# Patient Record
Sex: MALE | Race: WHITE | NOT HISPANIC OR LATINO | Employment: OTHER | URBAN - METROPOLITAN AREA
[De-identification: names, ages, dates, MRNs, and addresses within clinical notes are randomized per-mention and may not be internally consistent; named-entity substitution may affect disease eponyms.]

---

## 2017-07-21 ENCOUNTER — HISTORICAL (OUTPATIENT)
Dept: LAB | Facility: HOSPITAL | Age: 77
End: 2017-07-21

## 2017-07-21 LAB
ALBUMIN SERPL-MCNC: 3.6 GM/DL (ref 3.4–5)
ALBUMIN/GLOB SERPL: 1.2 RATIO (ref 1.1–2)
ALP SERPL-CCNC: 75 UNIT/L (ref 50–136)
ALT SERPL-CCNC: 33 UNIT/L (ref 12–78)
AST SERPL-CCNC: 25 UNIT/L (ref 10–37)
BILIRUB SERPL-MCNC: 0.8 MG/DL (ref 0.2–1)
BILIRUBIN DIRECT+TOT PNL SERPL-MCNC: 0.17 MG/DL (ref 0.05–0.2)
BILIRUBIN DIRECT+TOT PNL SERPL-MCNC: 0.63 MG/DL
BUN SERPL-MCNC: 18 MG/DL (ref 7–18)
CALCIUM SERPL-MCNC: 8.9 MG/DL (ref 8.5–10.1)
CHLORIDE SERPL-SCNC: 101 MMOL/L (ref 98–107)
CHOLEST SERPL-MCNC: 157 MG/DL (ref 50–200)
CHOLEST/HDLC SERPL: 2 {RATIO} (ref 0–5)
CO2 SERPL-SCNC: 31.1 MMOL/L (ref 21–32)
CREAT SERPL-MCNC: 0.94 MG/DL (ref 0.7–1.3)
GLOBULIN SER-MCNC: 3 GM/DL (ref 2.4–3.5)
GLUCOSE SERPL-MCNC: 97 MG/DL (ref 74–106)
HDLC SERPL-MCNC: 67 MG/DL (ref 35–60)
LDLC SERPL CALC-MCNC: 68 MG/DL (ref 50–140)
POTASSIUM SERPL-SCNC: 4 MMOL/L (ref 3.5–5.1)
PROT SERPL-MCNC: 6.6 GM/DL (ref 6.4–8.2)
SODIUM SERPL-SCNC: 140 MMOL/L (ref 136–145)
TRIGL SERPL-MCNC: 111 MG/DL (ref 30–150)
VLDLC SERPL CALC-MCNC: 22 MG/DL

## 2018-02-14 ENCOUNTER — HISTORICAL (OUTPATIENT)
Dept: SURGERY | Facility: HOSPITAL | Age: 78
End: 2018-02-14

## 2018-02-14 LAB
ABS NEUT (OLG): 3.7 X10(3)/MCL (ref 1.5–6.9)
ALBUMIN SERPL-MCNC: 3.7 GM/DL (ref 3.4–5)
ALBUMIN/GLOB SERPL: 1.1 RATIO
ALP SERPL-CCNC: 82 UNIT/L (ref 30–113)
ALT SERPL-CCNC: 32 UNIT/L (ref 10–45)
APTT PPP: 25.5 SECOND(S) (ref 25–35)
AST SERPL-CCNC: 28 UNIT/L (ref 15–37)
BILIRUB SERPL-MCNC: 0.8 MG/DL (ref 0.1–0.9)
BILIRUBIN DIRECT+TOT PNL SERPL-MCNC: 0.1 MG/DL (ref 0–0.3)
BILIRUBIN DIRECT+TOT PNL SERPL-MCNC: 0.7 MG/DL
BUN SERPL-MCNC: 16 MG/DL (ref 10–20)
CALCIUM SERPL-MCNC: 8.6 MG/DL (ref 8–10.5)
CHLORIDE SERPL-SCNC: 101 MMOL/L (ref 100–108)
CO2 SERPL-SCNC: 35 MMOL/L (ref 21–35)
CREAT SERPL-MCNC: 0.93 MG/DL (ref 0.7–1.3)
ERYTHROCYTE [DISTWIDTH] IN BLOOD BY AUTOMATED COUNT: 12.5 % (ref 11.5–17)
GLOBULIN SER-MCNC: 3.3 GM/DL
GLUCOSE SERPL-MCNC: 95 MG/DL (ref 75–116)
HCT VFR BLD AUTO: 42.4 % (ref 42–52)
HGB BLD-MCNC: 14.1 GM/DL (ref 14–18)
INR PPP: 0.9 (ref 0–1.2)
MCH RBC QN AUTO: 31 PG (ref 27–34)
MCHC RBC AUTO-ENTMCNC: 33 GM/DL (ref 31–36)
MCV RBC AUTO: 92 FL (ref 80–99)
PLATELET # BLD AUTO: 256 X10(3)/MCL (ref 140–400)
PMV BLD AUTO: 9.9 FL (ref 6.8–10)
POTASSIUM SERPL-SCNC: 3.9 MMOL/L (ref 3.6–5.2)
PROT SERPL-MCNC: 7 GM/DL (ref 6.4–8.2)
PROTHROMBIN TIME: 9.9 SECOND(S) (ref 9–12)
RBC # BLD AUTO: 4.6 X10(6)/MCL (ref 4.7–6.1)
SODIUM SERPL-SCNC: 141 MMOL/L (ref 135–145)
WBC # SPEC AUTO: 6.5 X10(3)/MCL (ref 4.5–11.5)

## 2018-02-20 ENCOUNTER — HISTORICAL (OUTPATIENT)
Dept: ANESTHESIOLOGY | Facility: HOSPITAL | Age: 78
End: 2018-02-20

## 2018-07-23 ENCOUNTER — HISTORICAL (OUTPATIENT)
Dept: LAB | Facility: HOSPITAL | Age: 78
End: 2018-07-23

## 2018-07-23 LAB
ALBUMIN SERPL-MCNC: 3.5 GM/DL (ref 3.4–5)
ALBUMIN/GLOB SERPL: 1.1 RATIO (ref 1.1–2)
ALP SERPL-CCNC: 76 UNIT/L (ref 46–116)
ALT SERPL-CCNC: 26 UNIT/L (ref 12–78)
AST SERPL-CCNC: 21 UNIT/L (ref 10–37)
BILIRUB SERPL-MCNC: 0.7 MG/DL (ref 0.2–1)
BILIRUBIN DIRECT+TOT PNL SERPL-MCNC: 0.18 MG/DL (ref 0–0.2)
BILIRUBIN DIRECT+TOT PNL SERPL-MCNC: 0.52 MG/DL
BUN SERPL-MCNC: 18 MG/DL (ref 7–18)
CALCIUM SERPL-MCNC: 8.7 MG/DL (ref 8.5–10.1)
CHLORIDE SERPL-SCNC: 102 MMOL/L (ref 98–107)
CHOLEST SERPL-MCNC: 147 MG/DL (ref 50–200)
CHOLEST/HDLC SERPL: 3 {RATIO} (ref 0–5)
CO2 SERPL-SCNC: 29.8 MMOL/L (ref 21–32)
CREAT SERPL-MCNC: 0.99 MG/DL (ref 0.7–1.3)
GLOBULIN SER-MCNC: 3.3 GM/DL (ref 2.4–3.5)
GLUCOSE SERPL-MCNC: 85 MG/DL (ref 74–106)
HDLC SERPL-MCNC: 58 MG/DL (ref 35–60)
LDLC SERPL CALC-MCNC: 76 MG/DL (ref 50–140)
POTASSIUM SERPL-SCNC: 4.4 MMOL/L (ref 3.5–5.1)
PROT SERPL-MCNC: 6.8 GM/DL (ref 6.4–8.2)
SODIUM SERPL-SCNC: 141 MMOL/L (ref 136–145)
TRIGL SERPL-MCNC: 67 MG/DL (ref 30–150)
VLDLC SERPL CALC-MCNC: 13 MG/DL

## 2019-01-02 ENCOUNTER — HISTORICAL (OUTPATIENT)
Dept: LAB | Facility: HOSPITAL | Age: 79
End: 2019-01-02

## 2019-01-02 LAB
ABS NEUT (OLG): 3.6
ALBUMIN SERPL-MCNC: 3.9 GM/DL (ref 3.4–5)
ALBUMIN/GLOB SERPL: 1.1 RATIO (ref 1.1–2)
ALP SERPL-CCNC: 87 UNIT/L (ref 46–116)
ALT SERPL-CCNC: 26 UNIT/L (ref 12–78)
APTT PPP: 24.1 SECOND(S) (ref 24.5–32.8)
AST SERPL-CCNC: 19 UNIT/L (ref 10–37)
BASOPHILS # BLD AUTO: 0.01 X10(3)/MCL
BASOPHILS NFR BLD AUTO: 0.1 %
BILIRUB SERPL-MCNC: 0.9 MG/DL (ref 0.2–1)
BILIRUBIN DIRECT+TOT PNL SERPL-MCNC: 0.21 MG/DL (ref 0–0.2)
BILIRUBIN DIRECT+TOT PNL SERPL-MCNC: 0.69 MG/DL
BUN SERPL-MCNC: 18 MG/DL (ref 7–18)
CALCIUM SERPL-MCNC: 8.8 MG/DL (ref 8.5–10.1)
CHLORIDE SERPL-SCNC: 100 MMOL/L (ref 98–107)
CO2 SERPL-SCNC: 33.1 MMOL/L (ref 21–32)
CREAT SERPL-MCNC: 1.07 MG/DL (ref 0.7–1.3)
EOSINOPHIL # BLD AUTO: 0.2 X10(3)/MCL
EOSINOPHIL NFR BLD AUTO: 2.9 %
ERYTHROCYTE [DISTWIDTH] IN BLOOD BY AUTOMATED COUNT: 12 %
GLOBULIN SER-MCNC: 3.4 GM/DL (ref 2.4–3.5)
GLUCOSE SERPL-MCNC: 93 MG/DL (ref 74–106)
GROUP & RH: NORMAL
HCT VFR BLD AUTO: 46 % (ref 39–49)
HGB BLD-MCNC: 15.5 GM/DL (ref 12.6–16.6)
IMM GRANULOCYTES # BLD AUTO: 0 10*3/UL (ref 0–0.1)
IMM GRANULOCYTES NFR BLD AUTO: 0 % (ref 0–1)
INR PPP: 1
LYMPHOCYTES # BLD AUTO: 2.21 X10(3)/MCL
LYMPHOCYTES NFR BLD AUTO: 32.5 %
MCH RBC QN AUTO: 31 PG (ref 27–33)
MCHC RBC AUTO-ENTMCNC: 33.7 GM/DL (ref 32–35)
MCV RBC AUTO: 92 FL (ref 84–97)
MONOCYTES # BLD AUTO: 0.77 X10(3)/MCL
MONOCYTES NFR BLD AUTO: 11.3 %
NEUTROPHILS # BLD AUTO: 3.6 X10(3)/MCL
NEUTROPHILS NFR BLD AUTO: 53.2 %
PLATELET # BLD AUTO: 276 X10(3)/MCL (ref 151–368)
PMV BLD AUTO: 9 FL
POTASSIUM SERPL-SCNC: 4.1 MMOL/L (ref 3.5–5.1)
PROT SERPL-MCNC: 7.3 GM/DL (ref 6.4–8.2)
PROTHROMBIN TIME: 9.2 SECOND(S) (ref 8.6–10.1)
RBC # BLD AUTO: 5 X10(6)/MCL (ref 4.3–5.6)
SODIUM SERPL-SCNC: 140 MMOL/L (ref 136–145)
WBC # SPEC AUTO: 6.79 X10(3)/MCL (ref 3.4–9.2)

## 2019-01-04 ENCOUNTER — HISTORICAL (OUTPATIENT)
Dept: MEDSURG UNIT | Facility: HOSPITAL | Age: 79
End: 2019-01-04

## 2019-07-22 ENCOUNTER — HISTORICAL (OUTPATIENT)
Dept: LAB | Facility: HOSPITAL | Age: 79
End: 2019-07-22

## 2019-07-22 LAB
ALBUMIN SERPL-MCNC: 3.5 GM/DL (ref 3.4–5)
ALBUMIN/GLOB SERPL: 1.1 RATIO (ref 1.1–2)
ALP SERPL-CCNC: 76 UNIT/L (ref 46–116)
ALT SERPL-CCNC: 27 UNIT/L (ref 12–78)
AST SERPL-CCNC: 24 UNIT/L (ref 10–37)
BILIRUB SERPL-MCNC: 0.8 MG/DL (ref 0.2–1)
BILIRUBIN DIRECT+TOT PNL SERPL-MCNC: 0.19 MG/DL (ref 0–0.2)
BILIRUBIN DIRECT+TOT PNL SERPL-MCNC: 0.61 MG/DL
BUN SERPL-MCNC: 22 MG/DL (ref 7–18)
CALCIUM SERPL-MCNC: 8.7 MG/DL (ref 8.5–10.1)
CHLORIDE SERPL-SCNC: 101 MMOL/L (ref 98–107)
CHOLEST SERPL-MCNC: 145 MG/DL (ref 50–200)
CHOLEST/HDLC SERPL: 3 {RATIO} (ref 0–5)
CO2 SERPL-SCNC: 31 MMOL/L (ref 21–32)
CREAT SERPL-MCNC: 0.9 MG/DL (ref 0.7–1.3)
GLOBULIN SER-MCNC: 3.2 GM/DL (ref 2.4–3.5)
GLUCOSE SERPL-MCNC: 92 MG/DL (ref 74–106)
HDLC SERPL-MCNC: 57 MG/DL (ref 35–60)
LDLC SERPL CALC-MCNC: 74 MG/DL (ref 50–140)
POTASSIUM SERPL-SCNC: 4.1 MMOL/L (ref 3.5–5.1)
PROT SERPL-MCNC: 6.7 GM/DL (ref 6.4–8.2)
SODIUM SERPL-SCNC: 139 MMOL/L (ref 136–145)
TRIGL SERPL-MCNC: 68 MG/DL (ref 30–150)
VLDLC SERPL CALC-MCNC: 14 MG/DL

## 2019-10-24 ENCOUNTER — HISTORICAL (OUTPATIENT)
Dept: LAB | Facility: HOSPITAL | Age: 79
End: 2019-10-24

## 2019-10-24 LAB
BUN SERPL-MCNC: 13 MG/DL (ref 7–18)
CALCIUM SERPL-MCNC: 8.9 MG/DL (ref 8.5–10.1)
CHLORIDE SERPL-SCNC: 103 MMOL/L (ref 98–107)
CO2 SERPL-SCNC: 31.5 MMOL/L (ref 21–32)
CREAT SERPL-MCNC: 0.98 MG/DL (ref 0.7–1.3)
CREAT/UREA NIT SERPL: 13
GLUCOSE SERPL-MCNC: 84 MG/DL (ref 74–106)
POTASSIUM SERPL-SCNC: 4.2 MMOL/L (ref 3.5–5.1)
SODIUM SERPL-SCNC: 140 MMOL/L (ref 136–145)

## 2019-11-19 ENCOUNTER — HISTORICAL (OUTPATIENT)
Dept: LAB | Facility: HOSPITAL | Age: 79
End: 2019-11-19

## 2019-11-19 LAB
ALBUMIN SERPL-MCNC: 3.5 GM/DL (ref 3.4–5)
ALBUMIN/GLOB SERPL: 1.1 RATIO (ref 1.1–2)
ALP SERPL-CCNC: 80 UNIT/L (ref 46–116)
ALT SERPL-CCNC: 25 UNIT/L (ref 12–78)
AST SERPL-CCNC: 21 UNIT/L (ref 10–37)
BILIRUB SERPL-MCNC: 0.7 MG/DL (ref 0.2–1)
BILIRUBIN DIRECT+TOT PNL SERPL-MCNC: 0.15 MG/DL (ref 0–0.2)
BILIRUBIN DIRECT+TOT PNL SERPL-MCNC: 0.55 MG/DL
BUN SERPL-MCNC: 14 MG/DL (ref 7–18)
CALCIUM SERPL-MCNC: 8.9 MG/DL (ref 8.5–10.1)
CHLORIDE SERPL-SCNC: 103 MMOL/L (ref 98–107)
CHOLEST SERPL-MCNC: 143 MG/DL (ref 50–200)
CHOLEST/HDLC SERPL: 2 {RATIO} (ref 0–5)
CO2 SERPL-SCNC: 33.4 MMOL/L (ref 21–32)
CREAT SERPL-MCNC: 0.94 MG/DL (ref 0.7–1.3)
GLOBULIN SER-MCNC: 3.1 GM/DL (ref 2.4–3.5)
GLUCOSE SERPL-MCNC: 88 MG/DL (ref 74–106)
HDLC SERPL-MCNC: 70 MG/DL (ref 35–60)
LDLC SERPL CALC-MCNC: 59 MG/DL (ref 50–140)
POTASSIUM SERPL-SCNC: 4.6 MMOL/L (ref 3.5–5.1)
PROT SERPL-MCNC: 6.6 GM/DL (ref 6.4–8.2)
SODIUM SERPL-SCNC: 140 MMOL/L (ref 136–145)
TRIGL SERPL-MCNC: 71 MG/DL (ref 30–150)
VLDLC SERPL CALC-MCNC: 14 MG/DL

## 2020-01-07 ENCOUNTER — HISTORICAL (OUTPATIENT)
Dept: LAB | Facility: HOSPITAL | Age: 80
End: 2020-01-07

## 2020-01-07 LAB
BUN SERPL-MCNC: 20 MG/DL (ref 8.4–25.7)
CALCIUM SERPL-MCNC: 9.6 MG/DL (ref 8.8–10)
CHLORIDE SERPL-SCNC: 101 MMOL/L (ref 98–107)
CO2 SERPL-SCNC: 32 MEQ/L (ref 23–31)
CREAT SERPL-MCNC: 1.23 MG/DL (ref 0.73–1.18)
CREAT/UREA NIT SERPL: 16
GLUCOSE SERPL-MCNC: 96 MG/DL (ref 82–115)
POTASSIUM SERPL-SCNC: 4.9 MMOL/L (ref 3.5–5.1)
SODIUM SERPL-SCNC: 141 MMOL/L (ref 136–145)

## 2020-02-06 ENCOUNTER — HISTORICAL (OUTPATIENT)
Dept: LAB | Facility: HOSPITAL | Age: 80
End: 2020-02-06

## 2020-02-06 LAB — PSA SERPL-MCNC: 0.65 NG/ML

## 2020-07-29 ENCOUNTER — HISTORICAL (OUTPATIENT)
Dept: LAB | Facility: HOSPITAL | Age: 80
End: 2020-07-29

## 2020-07-29 LAB — TSH SERPL-ACNC: 13.77 UIU/ML (ref 0.35–4.94)

## 2020-11-16 ENCOUNTER — HISTORICAL (OUTPATIENT)
Dept: RADIOLOGY | Facility: HOSPITAL | Age: 80
End: 2020-11-16

## 2020-11-23 ENCOUNTER — HISTORICAL (OUTPATIENT)
Dept: LAB | Facility: HOSPITAL | Age: 80
End: 2020-11-23

## 2020-11-23 LAB — TSH SERPL-ACNC: 8.75 UIU/ML (ref 0.35–4.94)

## 2021-11-17 ENCOUNTER — HISTORICAL (OUTPATIENT)
Dept: LAB | Facility: HOSPITAL | Age: 81
End: 2021-11-17

## 2021-11-17 LAB
ABS NEUT (OLG): 3.46
ALBUMIN SERPL-MCNC: 3.6 GM/DL (ref 3.4–4.8)
ALBUMIN/GLOB SERPL: 1.2 RATIO (ref 1.1–2)
ALP SERPL-CCNC: 82 UNIT/L (ref 40–150)
ALT SERPL-CCNC: 54 UNIT/L (ref 0–55)
AST SERPL-CCNC: 39 UNIT/L (ref 5–34)
BASOPHILS # BLD AUTO: 0.01 X10(3)/MCL
BASOPHILS NFR BLD AUTO: 0.2 %
BILIRUB SERPL-MCNC: 0.8 MG/DL
BILIRUBIN DIRECT+TOT PNL SERPL-MCNC: 0.3 MG/DL (ref 0–0.5)
BILIRUBIN DIRECT+TOT PNL SERPL-MCNC: 0.5 MG/DL
BUN SERPL-MCNC: 13 MG/DL (ref 8.4–25.7)
CALCIUM SERPL-MCNC: 9.1 MG/DL (ref 8.7–10.5)
CHLORIDE SERPL-SCNC: 104 MMOL/L (ref 98–107)
CHOLEST SERPL-MCNC: 154 MG/DL
CHOLEST/HDLC SERPL: 3 {RATIO} (ref 0–5)
CO2 SERPL-SCNC: 31 MEQ/L (ref 23–31)
CREAT SERPL-MCNC: 1.31 MG/DL (ref 0.73–1.18)
EOSINOPHIL # BLD AUTO: 0.21 X10(3)/MCL
EOSINOPHIL NFR BLD AUTO: 3.7 %
ERYTHROCYTE [DISTWIDTH] IN BLOOD BY AUTOMATED COUNT: 12 %
GLOBULIN SER-MCNC: 3 GM/DL (ref 2.4–3.5)
GLUCOSE SERPL-MCNC: 93 MG/DL (ref 82–115)
HCT VFR BLD AUTO: 42.8 % (ref 39–49)
HDLC SERPL-MCNC: 57 MG/DL (ref 35–60)
HGB BLD-MCNC: 13.6 GM/DL (ref 12.6–16.6)
IMM GRANULOCYTES # BLD AUTO: 0.01 10*3/UL (ref 0–0.1)
IMM GRANULOCYTES NFR BLD AUTO: 0.2 % (ref 0–1)
LDLC SERPL CALC-MCNC: 78 MG/DL (ref 50–140)
LYMPHOCYTES # BLD AUTO: 1.52 X10(3)/MCL
LYMPHOCYTES NFR BLD AUTO: 26.5 %
MCH RBC QN AUTO: 31 PG (ref 27–33)
MCHC RBC AUTO-ENTMCNC: 31.8 GM/DL (ref 32–35)
MCV RBC AUTO: 97.5 FL (ref 84–97)
MONOCYTES # BLD AUTO: 0.52 X10(3)/MCL
MONOCYTES NFR BLD AUTO: 9.1 %
NEUTROPHILS # BLD AUTO: 3.46 X10(3)/MCL
NEUTROPHILS NFR BLD AUTO: 60.3 %
PLATELET # BLD AUTO: 281 X10(3)/MCL (ref 140–450)
PMV BLD AUTO: 9 FL
POTASSIUM SERPL-SCNC: 5.1 MMOL/L (ref 3.5–5.1)
PROT SERPL-MCNC: 6.6 GM/DL (ref 5.8–7.6)
PSA SERPL-MCNC: 0.55 NG/ML
RBC # BLD AUTO: 4.39 X10(6)/MCL (ref 4.3–5.6)
SODIUM SERPL-SCNC: 141 MMOL/L (ref 136–145)
TRIGL SERPL-MCNC: 96 MG/DL (ref 34–140)
TSH SERPL-ACNC: 7.77 UIU/ML (ref 0.35–4.94)
VLDLC SERPL CALC-MCNC: 19 MG/DL
WBC # SPEC AUTO: 5.73 X10(3)/MCL (ref 3.4–9.2)

## 2022-02-14 ENCOUNTER — HISTORICAL (OUTPATIENT)
Dept: RADIOLOGY | Facility: HOSPITAL | Age: 82
End: 2022-02-14

## 2022-02-14 ENCOUNTER — HISTORICAL (OUTPATIENT)
Dept: ADMINISTRATIVE | Facility: HOSPITAL | Age: 82
End: 2022-02-14

## 2022-02-14 LAB — TSH SERPL-ACNC: 5.17 M[IU]/L (ref 0.35–4.94)

## 2022-03-17 ENCOUNTER — HISTORICAL (OUTPATIENT)
Dept: LAB | Facility: HOSPITAL | Age: 82
End: 2022-03-17

## 2022-03-17 LAB
ALBUMIN SERPL-MCNC: 3.5 G/DL (ref 3.4–4.8)
ALBUMIN/GLOB SERPL: 1.1 {RATIO} (ref 1.1–2)
ALP SERPL-CCNC: 99 U/L (ref 40–150)
ALT SERPL-CCNC: 53 U/L (ref 0–55)
AST SERPL-CCNC: 38 U/L (ref 5–34)
BILIRUB SERPL-MCNC: 0.7 MG/DL
BILIRUBIN DIRECT+TOT PNL SERPL-MCNC: 0.3 (ref 0–0.5)
BILIRUBIN DIRECT+TOT PNL SERPL-MCNC: 0.4
BUN SERPL-MCNC: 16 MG/DL (ref 8.4–25.7)
CALCIUM SERPL-MCNC: 9.3 MG/DL (ref 8.7–10.5)
CHLORIDE SERPL-SCNC: 104 MMOL/L (ref 98–107)
CHOLEST SERPL-MCNC: 156 MG/DL
CHOLEST/HDLC SERPL: 3 {RATIO} (ref 0–5)
CO2 SERPL-SCNC: 32 MMOL/L (ref 23–31)
CREAT SERPL-MCNC: 1.05 MG/DL (ref 0.73–1.18)
GLOBULIN SER-MCNC: 3.1 G/DL (ref 2.4–3.5)
GLUCOSE SERPL-MCNC: 100 MG/DL (ref 82–115)
HDLC SERPL-MCNC: 52 MG/DL (ref 35–60)
HEMOLYSIS INTERF INDEX SERPL-ACNC: 9
HEMOLYSIS INTERF INDEX SERPL-ACNC: 9
ICTERIC INTERF INDEX SERPL-ACNC: 0
LDLC SERPL CALC-MCNC: 80 MG/DL (ref 50–140)
LIPEMIC INTERF INDEX SERPL-ACNC: 7
MAGNESIUM SERPL-MCNC: 2.2 MG/DL (ref 1.6–2.6)
POTASSIUM SERPL-SCNC: 4.7 MMOL/L (ref 3.5–5.1)
PROT SERPL-MCNC: 6.6 G/DL (ref 5.8–7.6)
SODIUM SERPL-SCNC: 143 MMOL/L (ref 136–145)
TRIGL SERPL-MCNC: 122 MG/DL (ref 34–140)
VLDLC SERPL CALC-MCNC: 24 MG/DL

## 2022-04-11 ENCOUNTER — HISTORICAL (OUTPATIENT)
Dept: ADMINISTRATIVE | Facility: HOSPITAL | Age: 82
End: 2022-04-11
Payer: MEDICARE

## 2022-04-27 VITALS
BODY MASS INDEX: 28.23 KG/M2 | DIASTOLIC BLOOD PRESSURE: 80 MMHG | SYSTOLIC BLOOD PRESSURE: 124 MMHG | HEIGHT: 74 IN | WEIGHT: 220 LBS

## 2022-04-30 NOTE — OP NOTE
DATE OF SURGERY:    01/04/2019    SURGEON:  Brian Catherine MD    BRIEF OR NOTE:    Under spinal anesthesia, patient underwent repair of bilateral inguinal hernias.  Patient had a large direct hernia noted on both sides.  Left side had larger hernia than the right side.  The hernia sacs were reduced back into the preperitoneal space.  Repair done with bioabsorbable plug as well as the Surgimesh onlay patch.  He tolerated the procedure well.        ______________________________  Brian Catherine MD    JM/UR  DD:  01/04/2019  Time:  09:18PM  DT:  01/04/2019  Time:  10:04PM  Job #:  [id number]

## 2022-04-30 NOTE — H&P
Patient:   Adal Phoenix Jr            MRN: [id number]            FIN: [id number]               Age:   77 years     Sex:  Male     :  1940   Associated Diagnoses:   None   Author:   Mert Ordaz MD      Patient: Adal Phoenix  YOB: 1940  Age: 77y  Referring Physician: Federico Nixon MD    Chief Complaint: evaluation of squamous cell carcinoma left ear helix    Historian: patient who is a good historian.     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  The patient had a six-month history of a nonhealing lesion involving the helix of the left ear which had been associated with some pain.  Patient recently had undergone a shave biopsy of this lesion with the report indicating the presence of squamous cell carcinoma with a deep positive margin.  He has not had any other treatment or diagnostic evaluation.  The patient does have a long history of frequent sun exposure and does have a history of multiple skin cancers treated in the past for which he had undergone excision.  He does not have a history of melanoma.  Currently he does not have any pain at the site of biopsy.  He does not have any swelling in the neck or in the parotid region that he is aware of.    Past Medical History:  High blood pressure.   Hypercholesterolemia.   History of coronary artery disease status post myocardial infarction in  at which time he underwent placement of 2 coronary artery stents.  Subsequently underwent coronary artery bypass grafting in .    Past Surgical History:   Excision of multiple skin cancers.  CABG   Right cataract extraction on 2018.    Current Medications:  Performed Reconciliation  Active Medications   Enalapril  . Take 1 5mg Daily. Do not substitute. (null).   Lipitor 10 mg. Take 1 Daily. Do not substitute. (null).   Metoprolol Succinate  . Take ER 1 25mg  Daily. Do not substitute. (null).   Nitroglycerin Lingual Spray  . Take 1 As Directed. Do not substitute. (null).      Allergies:  Performed Reconciliation   No Active Allergies    Review of Systems:  Review of systems is unremarkable except as mentioned above.     Social History:  Patient has never used tobacco. Patient denies alcohol use. Patient denies the use of illicit drugs. Patient is retired. Patient is .     Family History:  There is no family history of bleeding diathesis.   There is no family history of anesthetic complications.   There is a family history of hypertension.   History of cancer.   Vitals:   Weight: 235.0 lbs  Temperature: 96.6° F  Heart rate 69 bpm  Blood pressure 150 / 87 mmHg      Physical Exam:  General: Well developed & well nourished male in no acute distress. Voice is normal.   Head & Face: Normocephalic without any apparent skin cancer, facial swelling, masses, or erythema.   Ears:    Right ear: In the right ear the auricle is normally developed & without lesions, the external auditory canal is normal, the tympanic membrane is non-erythematous, & there is no middle ear effusion.   Left ear: There is a three-quarter to 1 cm nodular lesion involving the midportion of the helix of the ear with central ulceration at the site of the patient's biopsy.  External auditory canals clear.  Tympanic membrane is nonerythematous.  No middle ear effusion.  Nose: Nasal dorsum is unremarkable. No significant nasal congestion, abnormal secretions, or septal deviation. No intranasal masses or polyps.   Oral cavity & oropharynx: Tongue & floor of mouth are normal. Mucosa is moist. No significant tonsil hypertrophy. No pharyngeal erythema, exudate, or masses. Hard & soft palate are normal.   Neck: Neck is supple without adenopathy, thyromegaly, swelling or tenderness. Trachea is in the midline. Parotid & submandibular glands are non-tender & without swelling, tenderness, or masses.   Chest:   Clear to auscultation. No adventitial sounds.   Cardiovascular: Regular rate & rhythm. No murmurs. No carotid bruits.   Abdomen: Non-distended.   Extremities: No cyanosis, clubbing , or edema.   Eyes: Extraocular muscles are intact.   Neurologic: Alert & oriented. Cranial nerves 2- 12 are grossly normal.     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    Plan:  Risks & alternatives to the proposed procedure were discussed as per consent. Questions were answered.  Patient is scheduled for excision of squamous cell carcinoma helix of left ear with frozen section analysis and probable reconstruction with helical advancement flaps on 02/20/2018.

## 2022-04-30 NOTE — OP NOTE
DATE OF SURGERY:    01/04/2019    SURGEON:  Brian Catherine MD    PREOPERATIVE DIAGNOSIS:    1. Inguinal hernia left side.    2. Inguinal hernia right side.    POSTOPERATIVE DIAGNOSIS:    1. Direct inguinal hernia, left side.    2. Direct inguinal hernia, right side.    ANESTHESIA:  Spinal.    OPERATION PERFORMED:    1. Exploration of the left inguinal area and repair of a direct left inguinal hernia using bioabsorbable plug and Surgimesh onlay patch.    2. Exploration of the right inguinal area and repair of a direct inguinal hernia using bioabsorbable plug and Surgimesh onlay patch.    PROCEDURE IN DETAIL:  This 78-year-old male patient was brought to the operating room.  Spinal anesthesia was given. Subsequently he was placed in supine position.  Indwelling Bee catheter was introduced.  Lower abdomen, genitalia, groin areas and upper thighs were prepared and draped in usual fashion.  Initially, a left inguinal hernia was repaired.  I placed a left inguinal oblique incision.  Skin and subcutaneous tissues were incised.  External oblique aponeurosis incised in line of fibers.  Flaps were raised to either side.  The cord structures identified with the large hernia sac in the medial aspect.  Hernia sac was slowly  from the cord structures.  It appears it is a direct inguinal hernia.  It was completely , reduced back into the preperitoneal space.  A bioabsorbable plug was placed in that defect and attached to the fascial rim with 2-0 Vicryl interrupted sutures.  Following this, the shelving border of the inguinal ligament was approximated to the conjoined tendon with 2-0 Prolene continuous sutures. Over the repaired area I placed Surgimesh attached to the fascia with 2-0 Vicryl sutures.  Extreme care was taken not to tighten the internal inguinal ring.  Following this, the external oblique aponeurosis approximated posterior to the cord structures with 2-0 Prolene continuous sutures.  Cord was  placed over the repaired area and subsequently the subcutaneous tissue closed in 2 layers, the Vanessa's fascia approximated with 2-0 Vicryl sutures and Camper's fascia closed by 2-0 plain gut interrupted sutures.  Skin approximated with skin clips.  Following this, the right inguinal area was approached.  Again a right inguinal oblique incision made.  Skin and subcutaneous tissues were incised.  External oblique aponeurosis incised in the line of fibers.  Cord structures identified, cremasteric fascia incised.  The cord was down to the direct inguinal hernia sac, a large hernia sac noted on this side also.  This was  from the adjoining tissue and was reduced back into the preperitoneal space.  The defect was closed by placing a bioabsorbable plug and it was attached to the fascial rim with 2-0 Vicryl sutures.  Following this, the shelving border of the inguinal ligament was approximated to the conjoined tendon with 2-0 Prolene continuous sutures.  Surgimesh was placed over the area and attached to the fascia with 2-0 Vicryl sutures.  Extreme care was taken not to tighten the internal inguinal ring.  Following this, the external oblique aponeurosis was approximated posterior to the cord structures with 2-0 Prolene continuous sutures.  Cord was placed over the repaired area and subcutaneous tissue was approximated in 2 layers.  The Vanessa's fascia closed by 2-0 Vicryl sutures, Camper's fascia closed by 2-0 plain gut interrupted sutures. Skin approximated with skin clips. Both surgical sites were cleaned and dressing applied.  He tolerated the procedure well.  He was transferred to recovery room in good condition.        ______________________________  MD SHOBHA Arambula/ISAIAS  DD:  01/04/2019  Time:  09:17PM  DT:  01/05/2019  Time:  05:45AM  Job #:  [id number]    cc: Mert VITAL M.D.

## 2022-04-30 NOTE — H&P
SOCIAL SECURITY #:      :  1940    CHIEF COMPLAINT:  Swelling noted in both groin areas.    HISTORY OF PRESENT ILLNESS:  This 78-year-old male patient reported to my office because of swellings noted in the groin areas.  Patient has discomfort from the same.  He does not see any local primary physicians.  He has seen Dr. Mert Ordaz  for some head and neck problems.  He has suggested surgical repair of the hernia and hence referred to me for the same.  The bilateral inguinal hernias were diagnosed on clinical examination.  Both are reducible.  He is known to have hypertensive cardiovascular disease, hypercholesteremia, history of myocardial infarction in the past, and also he is obese.  He is on medications including enalapril with hydrochlorothiazide, atorvastatin, metoprolol, aspirin and nitroglycerin sublingual.    PAST MEDICAL HISTORY:  He gives a history of coronary artery bypass in the past, angioplasty and stent insertion of the carotid arteries, cataract extraction, excision of skin lesions from several areas of the face and neck.    SOCIAL HISTORY:  He does not smoke.  No history of alcohol abuse.  No history of any illicit drug use.  He lives alone.    FAMILY HISTORY:  Strong family history of hypertension.  Both parents had hypertension.    REVIEW OF SYSTEMS:  RESPIRATORY:  No shortness of breath.  CARDIOVASCULAR:  Known to have coronary artery disease.  HEMATOLOGIC:  No bleeding tendencies.  NEUROLOGIC:  No seizures.  GASTROINTESTINAL:  Patient has no abdominal pain, but has bilateral inguinal hernia in the lower abdomen, has some discomfort from the same.  ENDOCRINE:  He has no diabetes mellitus.  HEMATOLOGIC:  No bleeding tendencies.  MUSCULOSKELETAL:  Vague joint pains.  INTERNAL:  No difficulty in passing urine.  PSYCHIATRIC:  No complaints.  GENERAL:  Patient has bilateral inguinal hernias.  OTHER SYSTEMS:  No complaints.    PHYSICAL EXAMINATION:  GENERAL:  Condition of the patient is  satisfactory.  Moderately obese, elderly male HEENT:  No scalp lesion.  Oral cavity and throat normal.   NECK:  Supple.  No cervical or supraclavicular lymphadenopathy.  No thyroid nodules. Trachea central.  No carotid bruit.  Jugular venous pressure not engorged.     CHEST:  Lungs are air entry equal on both sides.  No rales or wheezing.   HEART:  Regular, no murmur.     ABDOMEN:  Soft.  Liver and spleen not enlarged.  No masses palpable.  No area of tenderness.  Good bowel sounds.  No costovertebral angle tenderness.  Inguinal areas, patient has bilateral inguinal hernia.  Both are reducible.     GENITALIA:  Testes normal.  Penis no abnormality noted.   EXTREMITIES:  No pedal edema.  Femoral and popliteal pulses are present.  Pedal pulses feeble.  Upper extremities normal.   SPINE:  Normal.   NEUROLOGIC:  Status normal.    CLINICAL IMPRESSION:  Bilateral inguinal hernia.    PLAN:  The patient is scheduled for repair of bilateral inguinal hernia.        ______________________________  MD SHOBHA Arambula/AMBROSE  DD:  01/04/2019  Time:  04:02AM  DT:  01/04/2019  Time:  05:31AM  Job #:  [id number]    The H&P was reviewed, the patient was examined, and the following changes to the patients   condition are noted:  _____________________________________________________________________________  ______________________________________________________________________________  ______________________________________________________________________________  [  ]  No changes to the patient's condition:      ______________________________                                             ___________________  PHYSICIAN SIGNATURE                                                             DATE/TIME    cc: MD Mert Arambula MD.

## 2022-04-30 NOTE — OP NOTE
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    Postoperative diagnosis: Same.    Procedure: Excision of squamous cell carcinoma of the helix of the left ear (1.5 cm x 1.5 cm) with reconstruction with an inferiorly based helical advancement flap (3.25 x 1.5 cm).    Surgeon: Mert Ordaz M.D.    Findings: Patient had a lesion involving the midportion of the helix of the left ear with prior shave biopsy indicating the lesion to be squamous cell carcinoma.  Frozen section analysis of the resected lesion today showed the presence of residual squamous cell carcinoma.  The lesion measured approximately 1.5 cm x 1.5 cm with margins.  Initial frozen section analysis showed positive deep margin.  Reexcision of the deep margin showed the margins to be free of disease.    Specimens: Skin lesion from the helix of the left ear.    Estimated blood loss: Less than 25 mL.    Complications: None.    Drains: None.    Anesthesia: Local anesthesia with 2% Xylocaine with 1-100,000 epinephrine with sedation.    Indications: Please see history and physical exam    Procedure: The patient was brought to the operating room and placed on the operating room table in supine position after which he was sedated.  Examination of the pinna of the left ear showed presence of an ulcerative lesion involving the helix of the ear in its midportion along its free edge at the site of the patient's prior shave biopsy.  This area was infiltrated with 2% Xylocaine with 1-100,000 epinephrine and then the patient was prepped and draped in sterile fashion.  The lesion was then excised with a transmural excision through the helix of the ear incorporating a margin of  normal-appearing skin both superior and inferior to the lesion and also anteriorly and inferiorly.  This excision included the underlying helical cartilage.  The lesion was then submitted for frozen section analysis and the report dictated that the lesion did have residual squamous cell carcinoma present in that there was extension of squamous cell carcinoma to the deep margin of the resection.  An additional transmural section of the portion of the antihelix just adjacent to the area of resection was removed and submitted for frozen section analysis report showing that the deep margin of this area was free of disease.  The surgical defect measured 1.5 cm in length in its vertical direction and extended approximately 0.75 cm on the anterior and posterior surface of the auricle giving a total area of 2.25 cm².  Hemostasis was obtained with monopolar cautery.  It was elected to reconstruct the defect using an inferiorly based helical flap.  An inferiorly based helical flap was created by extending an incision along the fold between the helix and antihelix inferiorly down to the lobule of the ear and a similar incision was made along the posterior aspect of the ear of an equal length.  The incision was then made through the subcutaneous tissue and was transmural.  This also included a small area of cartilage the length of the flap was 3.25 cm and extended onto the anterior and posterior surface of the pinna 0.75 cm (total of 1.5 cm).  The area of this flap was 4.8 cm².  A Burow's triangle of skin was then resected from the lobule on its anterior surface to facilitate mobilization and closure.  The helical advancement flap was then advanced superiorly into the defect.  The wound was closed in layers using interrupted 4-0 Vicryl suture to reapproximate the subcutaneous tissue as well as the auricular cartilage (were figure-of-eight sutures were used).  On the anterior surface the skin was then closed using a running 5-0  nylon horizontal mattress suture for the majority of the closure and interrupted 5-0 nylon suture at the corners.  The posterior aspect of the skin was then closed using a running 5-0 nylon suture.  The wound was examined and did not appear to be under any undue tension.  The flap was pink and with refill promptly upon blanching with pressure.  Bacitracin ointment was then applied to the wounds.  The procedure was then terminated the patient was awoken and brought back to his room in stable condition.  Patient tolerated the procedure well.    CC: Dr. Federico Cruz

## 2022-05-23 ENCOUNTER — CLINICAL SUPPORT (OUTPATIENT)
Dept: RESPIRATORY THERAPY | Facility: HOSPITAL | Age: 82
End: 2022-05-23
Attending: SURGERY
Payer: MEDICARE

## 2022-05-23 ENCOUNTER — LAB VISIT (OUTPATIENT)
Dept: LAB | Facility: HOSPITAL | Age: 82
End: 2022-05-23
Attending: SURGERY
Payer: MEDICARE

## 2022-05-23 ENCOUNTER — HOSPITAL ENCOUNTER (OUTPATIENT)
Dept: PREADMISSION TESTING | Facility: HOSPITAL | Age: 82
Discharge: HOME | End: 2022-05-23
Attending: SURGERY
Payer: MEDICARE

## 2022-05-23 DIAGNOSIS — Z01.818 PRE-OP TESTING: Primary | ICD-10-CM

## 2022-05-23 DIAGNOSIS — Z01.818 PRE-OP TESTING: ICD-10-CM

## 2022-05-23 DIAGNOSIS — E03.9 HYPOTHYROIDISM, UNSPECIFIED TYPE: ICD-10-CM

## 2022-05-23 DIAGNOSIS — C44.320 SQUAMOUS CELL CARCINOMA, FACE: Primary | ICD-10-CM

## 2022-05-23 LAB
ALBUMIN SERPL-MCNC: 3.5 GM/DL (ref 3.4–4.8)
ALBUMIN/GLOB SERPL: 1.2 RATIO (ref 1.1–2)
ALP SERPL-CCNC: 90 UNIT/L (ref 40–150)
ALT SERPL-CCNC: 125 UNIT/L (ref 0–55)
AST SERPL-CCNC: 50 UNIT/L (ref 5–34)
BASOPHILS # BLD AUTO: 0.02 X10(3)/MCL (ref 0–0.2)
BASOPHILS NFR BLD AUTO: 0.3 %
BILIRUBIN DIRECT+TOT PNL SERPL-MCNC: 0.6 MG/DL
BUN SERPL-MCNC: 18 MG/DL (ref 8.4–25.7)
CALCIUM SERPL-MCNC: 8.3 MG/DL (ref 8.8–10)
CHLORIDE SERPL-SCNC: 103 MMOL/L (ref 98–107)
CO2 SERPL-SCNC: 29 MMOL/L (ref 23–31)
CREAT SERPL-MCNC: 1.12 MG/DL (ref 0.73–1.18)
EOSINOPHIL # BLD AUTO: 0.09 X10(3)/MCL (ref 0–0.9)
EOSINOPHIL NFR BLD AUTO: 1.4 %
ERYTHROCYTE [DISTWIDTH] IN BLOOD BY AUTOMATED COUNT: 11.7 % (ref 11.5–17)
GLOBULIN SER-MCNC: 3 GM/DL (ref 2.4–3.5)
GLUCOSE SERPL-MCNC: 96 MG/DL (ref 82–115)
HCT VFR BLD AUTO: 43.3 % (ref 42–52)
HGB BLD-MCNC: 13.4 GM/DL (ref 14–18)
IMM GRANULOCYTES # BLD AUTO: 0.02 X10(3)/MCL (ref 0–0.02)
IMM GRANULOCYTES NFR BLD AUTO: 0.3 % (ref 0–0.43)
LYMPHOCYTES # BLD AUTO: 1.57 X10(3)/MCL (ref 0.6–4.6)
LYMPHOCYTES NFR BLD AUTO: 24.9 %
MCH RBC QN AUTO: 29.8 PG (ref 27–31)
MCHC RBC AUTO-ENTMCNC: 30.9 MG/DL (ref 33–36)
MCV RBC AUTO: 96.4 FL (ref 80–94)
MONOCYTES # BLD AUTO: 0.58 X10(3)/MCL (ref 0.1–1.3)
MONOCYTES NFR BLD AUTO: 9.2 %
NEUTROPHILS # BLD AUTO: 4 X10(3)/MCL (ref 2.1–9.2)
NEUTROPHILS NFR BLD AUTO: 63.9 %
NRBC BLD AUTO-RTO: 0 %
PLATELET # BLD AUTO: 270 X10(3)/MCL (ref 130–400)
PMV BLD AUTO: 9.5 FL (ref 9.4–12.4)
POTASSIUM SERPL-SCNC: 4.5 MMOL/L (ref 3.5–5.1)
PROT SERPL-MCNC: 6.5 GM/DL (ref 5.8–7.6)
RBC # BLD AUTO: 4.49 X10(6)/MCL (ref 4.7–6.1)
SODIUM SERPL-SCNC: 140 MMOL/L (ref 136–145)
TSH SERPL-ACNC: 0.04 UIU/ML (ref 0.35–4.94)
WBC # SPEC AUTO: 6.3 X10(3)/MCL (ref 4.5–11.5)

## 2022-05-23 PROCEDURE — 93005 ELECTROCARDIOGRAM TRACING: CPT

## 2022-05-23 PROCEDURE — 84443 ASSAY THYROID STIM HORMONE: CPT

## 2022-05-23 PROCEDURE — 85025 COMPLETE CBC W/AUTO DIFF WBC: CPT

## 2022-05-23 PROCEDURE — 99900031 HC PATIENT EDUCATION (STAT)

## 2022-05-23 PROCEDURE — 36415 COLL VENOUS BLD VENIPUNCTURE: CPT

## 2022-05-23 PROCEDURE — 80053 COMPREHEN METABOLIC PANEL: CPT

## 2022-05-23 RX ORDER — SACUBITRIL AND VALSARTAN 49; 51 MG/1; MG/1
49-51 TABLET, FILM COATED ORAL 2 TIMES DAILY
COMMUNITY
End: 2022-05-23

## 2022-05-23 RX ORDER — SPIRONOLACTONE 25 MG/1
12.5 TABLET ORAL DAILY
COMMUNITY
Start: 2022-05-09

## 2022-05-23 RX ORDER — METOPROLOL SUCCINATE 50 MG/1
50 TABLET, EXTENDED RELEASE ORAL DAILY
COMMUNITY
Start: 2022-03-28 | End: 2022-08-01

## 2022-05-23 RX ORDER — ATORVASTATIN CALCIUM 40 MG/1
40 TABLET, FILM COATED ORAL NIGHTLY
COMMUNITY

## 2022-05-23 RX ORDER — LEVOTHYROXINE SODIUM 125 UG/1
125 TABLET ORAL DAILY
COMMUNITY
Start: 2022-05-18 | End: 2023-02-20

## 2022-05-23 RX ORDER — RANOLAZINE 500 MG/1
500 TABLET, EXTENDED RELEASE ORAL 2 TIMES DAILY
COMMUNITY
Start: 2022-02-28

## 2022-05-23 RX ORDER — AMIODARONE HYDROCHLORIDE 200 MG/1
200 TABLET ORAL 2 TIMES DAILY
COMMUNITY

## 2022-05-23 RX ORDER — NAPROXEN SODIUM 220 MG/1
81 TABLET, FILM COATED ORAL DAILY
COMMUNITY

## 2022-05-23 RX ORDER — FUROSEMIDE 40 MG/1
40 TABLET ORAL DAILY
COMMUNITY
End: 2023-12-04

## 2022-05-23 NOTE — DISCHARGE INSTRUCTIONS
NO driving for 24 hours    Leave dressing on for 2 days. DO NOT wet incision or dressing for 2 days.     In 2 days remove gauze dressing. Leave steri strips in place until follow up appointment    Ice pack to incision on and off for 24 hours    Go to ED with any complications

## 2022-05-24 ENCOUNTER — ANESTHESIA EVENT (OUTPATIENT)
Dept: SURGERY | Facility: HOSPITAL | Age: 82
End: 2022-05-24
Payer: MEDICARE

## 2022-05-24 NOTE — ANESTHESIA PREPROCEDURE EVALUATION
05/24/2022  Adal Gaona is a 81 y.o., male.      Pre-op Assessment    I have reviewed the Patient Summary Reports.       I have reviewed the Medications.     Review of Systems  Anesthesia Hx:  No problems with previous Anesthesia  Denies Family Hx of Anesthesia complications.   Denies Personal Hx of Anesthesia complications.   Social:  Non-Smoker    Hematology/Oncology:  Hematology Normal   Oncology Normal     EENT/Dental:EENT/Dental Normal   Cardiovascular:  Cardiovascular Normal     Pulmonary:  Pulmonary Normal    Renal/:  Renal/ Normal     Hepatic/GI:  Hepatic/GI Normal    Musculoskeletal:  Musculoskeletal Normal    Neurological:  Neurology Normal    Endocrine:  Endocrine Normal    Psych:  Psychiatric Normal           Physical Exam  General: Well nourished, Cooperative, Alert and Oriented    Airway:  Mallampati: I   Mouth Opening: Normal  TM Distance: Normal  Tongue: Normal  Neck ROM: Normal ROM    Dental:Numerous missing teeth  Chest/Lungs:  Normal Respiratory Rate    Heart:  Rate: Normal    Musculoskeletal:Normal mobility  unstable      Anesthesia Plan  Type of Anesthesia, risks & benefits discussed:    Anesthesia Type: MAC  Intra-op Monitoring Plan: Standard ASA Monitors  Post Op Pain Control Plan: multimodal analgesia  Induction:  IV  Informed Consent: Informed consent signed with the Patient and all parties understand the risks and agree with anesthesia plan.  All questions answered. Patient consented to blood products? Yes  ASA Score: 3  Day of Surgery Review of History & Physical: H&P Update referred to the surgeon/provider.  Anesthesia Plan Notes: Anesthesia plan was discussed with patient and/or representative. Risks and alternatives were discussed including the possibility of alteration of plan.     Ready For Surgery From Anesthesia Perspective.     .

## 2022-05-27 ENCOUNTER — ANESTHESIA (OUTPATIENT)
Dept: SURGERY | Facility: HOSPITAL | Age: 82
End: 2022-05-27
Payer: MEDICARE

## 2022-05-27 ENCOUNTER — HOSPITAL ENCOUNTER (OUTPATIENT)
Facility: HOSPITAL | Age: 82
Discharge: HOME | End: 2022-05-27
Attending: SURGERY | Admitting: SURGERY
Payer: MEDICARE

## 2022-05-27 VITALS
WEIGHT: 223.63 LBS | RESPIRATION RATE: 18 BRPM | BODY MASS INDEX: 28.7 KG/M2 | SYSTOLIC BLOOD PRESSURE: 121 MMHG | OXYGEN SATURATION: 99 % | TEMPERATURE: 98 F | DIASTOLIC BLOOD PRESSURE: 69 MMHG | HEIGHT: 74 IN | HEART RATE: 66 BPM

## 2022-05-27 DIAGNOSIS — C44.320 SCC (SQUAMOUS CELL CARCINOMA), FACE: ICD-10-CM

## 2022-05-27 DIAGNOSIS — C44.320 SQUAMOUS CELL CARCINOMA, FACE: ICD-10-CM

## 2022-05-27 PROCEDURE — 36000707: Performed by: SURGERY

## 2022-05-27 PROCEDURE — 71000015 HC POSTOP RECOV 1ST HR: Performed by: SURGERY

## 2022-05-27 PROCEDURE — 37000009 HC ANESTHESIA EA ADD 15 MINS: Performed by: SURGERY

## 2022-05-27 PROCEDURE — 63600175 PHARM REV CODE 636 W HCPCS: Performed by: NURSE ANESTHETIST, CERTIFIED REGISTERED

## 2022-05-27 PROCEDURE — 25000003 PHARM REV CODE 250: Performed by: SURGERY

## 2022-05-27 PROCEDURE — 25000003 PHARM REV CODE 250

## 2022-05-27 PROCEDURE — 71000016 HC POSTOP RECOV ADDL HR: Performed by: SURGERY

## 2022-05-27 PROCEDURE — 00300 ANES ALL PX INTEG H/N/PTRUNK: CPT | Mod: QZ,P3,QS | Performed by: NURSE ANESTHETIST, CERTIFIED REGISTERED

## 2022-05-27 PROCEDURE — 37000008 HC ANESTHESIA 1ST 15 MINUTES: Performed by: SURGERY

## 2022-05-27 PROCEDURE — D9220AH HC ANESTHESIA PROFESSIONAL FEE: Mod: QZ,P3,QS | Performed by: NURSE ANESTHETIST, CERTIFIED REGISTERED

## 2022-05-27 PROCEDURE — 36000706: Performed by: SURGERY

## 2022-05-27 PROCEDURE — 63600175 PHARM REV CODE 636 W HCPCS: Performed by: SURGERY

## 2022-05-27 RX ORDER — LIDOCAINE HYDROCHLORIDE 10 MG/ML
INJECTION, SOLUTION INTRAVENOUS
Status: DISCONTINUED | OUTPATIENT
Start: 2022-05-27 | End: 2022-05-27

## 2022-05-27 RX ORDER — FENTANYL CITRATE 50 UG/ML
INJECTION, SOLUTION INTRAMUSCULAR; INTRAVENOUS
Status: DISCONTINUED | OUTPATIENT
Start: 2022-05-27 | End: 2022-05-27

## 2022-05-27 RX ORDER — MORPHINE SULFATE 10 MG/ML
3 INJECTION INTRAMUSCULAR; INTRAVENOUS; SUBCUTANEOUS
Status: DISCONTINUED | OUTPATIENT
Start: 2022-05-27 | End: 2022-05-27 | Stop reason: HOSPADM

## 2022-05-27 RX ORDER — MIDAZOLAM HYDROCHLORIDE 1 MG/ML
INJECTION INTRAMUSCULAR; INTRAVENOUS
Status: DISCONTINUED | OUTPATIENT
Start: 2022-05-27 | End: 2022-05-27

## 2022-05-27 RX ORDER — HYDROCODONE BITARTRATE AND ACETAMINOPHEN 5; 325 MG/1; MG/1
1 TABLET ORAL EVERY 6 HOURS PRN
Qty: 25 TABLET | Refills: 0 | Status: ON HOLD | OUTPATIENT
Start: 2022-05-27 | End: 2022-07-27

## 2022-05-27 RX ORDER — HYDROCODONE BITARTRATE AND ACETAMINOPHEN 5; 325 MG/1; MG/1
1 TABLET ORAL EVERY 4 HOURS PRN
Status: DISCONTINUED | OUTPATIENT
Start: 2022-05-27 | End: 2022-05-27 | Stop reason: HOSPADM

## 2022-05-27 RX ORDER — SODIUM CHLORIDE 9 MG/ML
INJECTION, SOLUTION INTRAVENOUS CONTINUOUS
Status: DISCONTINUED | OUTPATIENT
Start: 2022-05-27 | End: 2022-05-27 | Stop reason: HOSPADM

## 2022-05-27 RX ORDER — CEFAZOLIN SODIUM 1 G/3ML
2 INJECTION, POWDER, FOR SOLUTION INTRAMUSCULAR; INTRAVENOUS
Status: DISCONTINUED | OUTPATIENT
Start: 2022-05-27 | End: 2022-05-27 | Stop reason: HOSPADM

## 2022-05-27 RX ORDER — PROPOFOL 10 MG/ML
VIAL (ML) INTRAVENOUS
Status: DISCONTINUED | OUTPATIENT
Start: 2022-05-27 | End: 2022-05-27

## 2022-05-27 RX ORDER — SODIUM CHLORIDE, SODIUM LACTATE, POTASSIUM CHLORIDE, CALCIUM CHLORIDE 600; 310; 30; 20 MG/100ML; MG/100ML; MG/100ML; MG/100ML
INJECTION, SOLUTION INTRAVENOUS CONTINUOUS
Status: DISCONTINUED | OUTPATIENT
Start: 2022-05-27 | End: 2022-05-27 | Stop reason: HOSPADM

## 2022-05-27 RX ORDER — LIDOCAINE HYDROCHLORIDE AND EPINEPHRINE 10; 10 MG/ML; UG/ML
INJECTION, SOLUTION INFILTRATION; PERINEURAL
Status: DISCONTINUED | OUTPATIENT
Start: 2022-05-27 | End: 2022-05-27 | Stop reason: HOSPADM

## 2022-05-27 RX ORDER — ONDANSETRON 4 MG/1
8 TABLET, ORALLY DISINTEGRATING ORAL EVERY 8 HOURS PRN
Status: DISCONTINUED | OUTPATIENT
Start: 2022-05-27 | End: 2022-05-27 | Stop reason: HOSPADM

## 2022-05-27 RX ORDER — ONDANSETRON 2 MG/ML
INJECTION INTRAMUSCULAR; INTRAVENOUS
Status: DISCONTINUED | OUTPATIENT
Start: 2022-05-27 | End: 2022-05-27

## 2022-05-27 RX ADMIN — FENTANYL CITRATE 25 MCG: 50 INJECTION INTRAMUSCULAR; INTRAVENOUS at 10:05

## 2022-05-27 RX ADMIN — PROPOFOL 30 MG: 10 INJECTION, EMULSION INTRAVENOUS at 10:05

## 2022-05-27 RX ADMIN — SODIUM CHLORIDE, POTASSIUM CHLORIDE, SODIUM LACTATE AND CALCIUM CHLORIDE: 600; 310; 30; 20 INJECTION, SOLUTION INTRAVENOUS at 07:05

## 2022-05-27 RX ADMIN — LIDOCAINE HYDROCHLORIDE 20 MG: 10 INJECTION, SOLUTION INTRAVENOUS at 10:05

## 2022-05-27 RX ADMIN — CEFAZOLIN 2 G: 1 INJECTION, POWDER, FOR SOLUTION INTRAMUSCULAR; INTRAVENOUS; PARENTERAL at 10:05

## 2022-05-27 RX ADMIN — ONDANSETRON HYDROCHLORIDE 4 MG: 2 SOLUTION INTRAMUSCULAR; INTRAVENOUS at 10:05

## 2022-05-27 RX ADMIN — PROPOFOL 30 MG: 10 INJECTION, EMULSION INTRAVENOUS at 11:05

## 2022-05-27 RX ADMIN — MIDAZOLAM HYDROCHLORIDE 1 MG: 1 INJECTION, SOLUTION INTRAMUSCULAR; INTRAVENOUS at 10:05

## 2022-05-27 NOTE — OP NOTE
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  Elected to undergo therapeutic wide excision.      Preoperative diagnosis:  Invasive squamous cell carcinoma of the face  Postoperative diagnosis:  Invasive squamous cell carcinoma of the face    Specimen:  1 x 3 cm ellipse of skin was centralized lesion oriented with a short superior and a long lateral suture    Procedure in detail:  Patient brought to the operative theater laid in a supine position intravenous MAC anesthesia was provided.  Preoperative antibiotics administered.  There the face was then sterilely prepped and draped in normal surgical fashion using chlorhexidine soap.  The previously incised region of the face was identified it was then demarcated with a pen for appropriate margins.  The area measured 1 x 3 cm in dimension.  It was then incised with the 15 blade with dissection down to the fatty tissues.  There was extracted in total and oriented with a short superior long lateral suture.  Cavity was made hemostatic with Bovie cauterization.  The wound edges were then reapproximated and multilayered fashion using 3-0 Vicryl running 4-0 subcuticular Monocryl.  Sterile dressing was then placed upon the wound.  Patient was then relieved of anesthesia stable condition and transferred to postanesthesia care unit.    Estimated blood loss:  3 cc  Complications:  None    Disposition:  Upon complete recovery from anesthesia patient will be discharged to home with a follow-up in surgery clinic in 1 week for re-evaluation       Gwendolyn Joel MD

## 2022-05-27 NOTE — ANESTHESIA POSTPROCEDURE EVALUATION
Anesthesia Post Evaluation    Patient: Adal Phoenix    Procedure(s) Performed: Procedure(s) (LRB):  EXCISION, LESION (Right)    Final Anesthesia Type: MAC      Patient location during evaluation: floor  Patient participation: Yes- Able to Participate  Level of consciousness: awake and alert  Post-procedure vital signs: reviewed and stable  Pain management: adequate  Airway patency: patent    PONV status at discharge: No PONV  Anesthetic complications: no      Cardiovascular status: blood pressure returned to baseline  Respiratory status: unassisted  Hydration status: euvolemic  Follow-up not needed.          Vitals Value Taken Time   /95 05/27/22 0740   Temp 36.5 °C (97.7 °F) 05/27/22 0739   Pulse 87 05/27/22 0739   Resp 18 05/27/22 0739   SpO2 99 % 05/27/22 0739         No case tracking events are documented in the log.      Pain/Silvia Score: No data recorded

## 2022-06-01 LAB
ESTROGEN SERPL-MCNC: NORMAL PG/ML
INSULIN SERPL-ACNC: NORMAL U[IU]/ML
LAB AP CLINICAL INFORMATION: NORMAL
LAB AP GROSS DESCRIPTION: NORMAL
LAB AP REPORT FOOTNOTES: NORMAL
T3RU NFR SERPL: NORMAL %

## 2022-07-26 ENCOUNTER — HOSPITAL ENCOUNTER (EMERGENCY)
Facility: HOSPITAL | Age: 82
Discharge: ANOTHER HOSPITAL | End: 2022-07-26
Attending: FAMILY MEDICINE
Payer: MEDICARE

## 2022-07-26 VITALS
DIASTOLIC BLOOD PRESSURE: 75 MMHG | WEIGHT: 220 LBS | HEART RATE: 66 BPM | OXYGEN SATURATION: 96 % | TEMPERATURE: 98 F | HEIGHT: 72 IN | BODY MASS INDEX: 29.8 KG/M2 | RESPIRATION RATE: 14 BRPM | SYSTOLIC BLOOD PRESSURE: 138 MMHG

## 2022-07-26 DIAGNOSIS — U07.1 COVID-19: Primary | ICD-10-CM

## 2022-07-26 DIAGNOSIS — R33.9 URINARY RETENTION: ICD-10-CM

## 2022-07-26 DIAGNOSIS — R42 DIZZINESS: ICD-10-CM

## 2022-07-26 DIAGNOSIS — E86.0 DEHYDRATION: ICD-10-CM

## 2022-07-26 DIAGNOSIS — R74.8 ELEVATED LIVER ENZYMES: ICD-10-CM

## 2022-07-26 LAB
ALBUMIN SERPL-MCNC: 2.9 GM/DL (ref 3.4–4.8)
ALBUMIN SERPL-MCNC: 3.2 GM/DL (ref 3.4–4.8)
ALBUMIN/GLOB SERPL: 1.1 RATIO (ref 1.1–2)
ALBUMIN/GLOB SERPL: 1.1 RATIO (ref 1.1–2)
ALP SERPL-CCNC: 106 UNIT/L (ref 40–150)
ALP SERPL-CCNC: 97 UNIT/L (ref 40–150)
ALT SERPL-CCNC: 1029 UNIT/L (ref 0–55)
ALT SERPL-CCNC: 915 UNIT/L (ref 0–55)
APAP SERPL-MCNC: <17.4 UG/ML (ref 17.4–30)
APPEARANCE UR: ABNORMAL
AST SERPL-CCNC: 613 UNIT/L (ref 5–34)
AST SERPL-CCNC: 694 UNIT/L (ref 5–34)
BACTERIA #/AREA URNS AUTO: ABNORMAL /HPF
BASOPHILS # BLD AUTO: 0.01 X10(3)/MCL (ref 0–0.2)
BASOPHILS NFR BLD AUTO: 0.2 %
BILIRUB UR QL STRIP.AUTO: NEGATIVE MG/DL
BILIRUBIN DIRECT+TOT PNL SERPL-MCNC: 1.1 MG/DL
BILIRUBIN DIRECT+TOT PNL SERPL-MCNC: 1.3 MG/DL
BUN SERPL-MCNC: 20 MG/DL (ref 8.4–25.7)
BUN SERPL-MCNC: 23 MG/DL (ref 8.4–25.7)
CALCIUM SERPL-MCNC: 7.6 MG/DL (ref 8.8–10)
CALCIUM SERPL-MCNC: 8.4 MG/DL (ref 8.8–10)
CHLORIDE SERPL-SCNC: 103 MMOL/L (ref 98–107)
CHLORIDE SERPL-SCNC: 98 MMOL/L (ref 98–107)
CK MB SERPL-MCNC: 2.9 NG/ML
CK SERPL-CCNC: 199 U/L (ref 30–200)
CO2 SERPL-SCNC: 23 MMOL/L (ref 23–31)
CO2 SERPL-SCNC: 26 MMOL/L (ref 23–31)
COLOR UR AUTO: YELLOW
CREAT SERPL-MCNC: 1.11 MG/DL (ref 0.73–1.18)
CREAT SERPL-MCNC: 1.4 MG/DL (ref 0.73–1.18)
EOSINOPHIL # BLD AUTO: 0.04 X10(3)/MCL (ref 0–0.9)
EOSINOPHIL NFR BLD AUTO: 0.7 %
ERYTHROCYTE [DISTWIDTH] IN BLOOD BY AUTOMATED COUNT: 11.8 % (ref 11.5–17)
ETHANOL SERPL-MCNC: <10 MG/DL
FLUAV AG UPPER RESP QL IA.RAPID: NOT DETECTED
FLUBV AG UPPER RESP QL IA.RAPID: NOT DETECTED
GLOBULIN SER-MCNC: 2.6 GM/DL (ref 2.4–3.5)
GLOBULIN SER-MCNC: 3 GM/DL (ref 2.4–3.5)
GLUCOSE SERPL-MCNC: 87 MG/DL (ref 82–115)
GLUCOSE SERPL-MCNC: 94 MG/DL (ref 82–115)
GLUCOSE UR QL STRIP.AUTO: NEGATIVE MG/DL
HCT VFR BLD AUTO: 39.7 % (ref 42–52)
HGB BLD-MCNC: 13.9 GM/DL (ref 14–18)
HYALINE CASTS URNS QL MICRO: ABNORMAL /HPF
IMM GRANULOCYTES # BLD AUTO: 0.03 X10(3)/MCL (ref 0–0.04)
IMM GRANULOCYTES NFR BLD AUTO: 0.5 %
INR BLD: 1.1 (ref 0–1.3)
KETONES UR QL STRIP.AUTO: ABNORMAL MG/DL
LACTATE SERPL-SCNC: 1.6 MMOL/L (ref 0.5–2.2)
LEUKOCYTE ESTERASE UR QL STRIP.AUTO: NEGATIVE UNIT/L
LYMPHOCYTES # BLD AUTO: 1.08 X10(3)/MCL (ref 0.6–4.6)
LYMPHOCYTES NFR BLD AUTO: 19 %
MCH RBC QN AUTO: 32.1 PG (ref 27–31)
MCHC RBC AUTO-ENTMCNC: 35 MG/DL (ref 33–36)
MCV RBC AUTO: 91.7 FL (ref 80–94)
MONOCYTES # BLD AUTO: 0.67 X10(3)/MCL (ref 0.1–1.3)
MONOCYTES NFR BLD AUTO: 11.8 %
MUCOUS THREADS URNS QL MICRO: ABNORMAL /LPF
NEUTROPHILS # BLD AUTO: 3.8 X10(3)/MCL (ref 2.1–9.2)
NEUTROPHILS NFR BLD AUTO: 67.8 %
NITRITE UR QL STRIP.AUTO: NEGATIVE
NRBC BLD AUTO-RTO: 0 %
PH UR STRIP.AUTO: 5.5 [PH]
PLATELET # BLD AUTO: 254 X10(3)/MCL (ref 130–400)
PMV BLD AUTO: 9.3 FL (ref 7.4–10.4)
POTASSIUM SERPL-SCNC: 3.9 MMOL/L (ref 3.5–5.1)
POTASSIUM SERPL-SCNC: 4.4 MMOL/L (ref 3.5–5.1)
PROT SERPL-MCNC: 5.5 GM/DL (ref 5.8–7.6)
PROT SERPL-MCNC: 6.2 GM/DL (ref 5.8–7.6)
PROT UR QL STRIP.AUTO: NEGATIVE MG/DL
PROTHROMBIN TIME: 10.7 SECONDS (ref 9.1–10.9)
RBC # BLD AUTO: 4.33 X10(6)/MCL (ref 4.7–6.1)
RBC #/AREA URNS AUTO: ABNORMAL /HPF
RBC UR QL AUTO: NEGATIVE UNIT/L
RENAL EPI CELLS #/AREA UR COMP ASSIST: ABNORMAL /HPF
SARS-COV-2 RNA RESP QL NAA+PROBE: DETECTED
SODIUM SERPL-SCNC: 138 MMOL/L (ref 136–145)
SODIUM SERPL-SCNC: 139 MMOL/L (ref 136–145)
SP GR UR STRIP.AUTO: 1.01
SQUAMOUS #/AREA URNS AUTO: ABNORMAL /HPF
TROPONIN I SERPL-MCNC: 0.02 NG/ML (ref 0–0.04)
UROBILINOGEN UR STRIP-ACNC: 0.2 MG/DL
WBC # SPEC AUTO: 5.7 X10(3)/MCL (ref 4.5–11.5)
WBC #/AREA URNS AUTO: ABNORMAL /HPF

## 2022-07-26 PROCEDURE — 85610 PROTHROMBIN TIME: CPT | Performed by: EMERGENCY MEDICINE

## 2022-07-26 PROCEDURE — 87040 BLOOD CULTURE FOR BACTERIA: CPT | Performed by: FAMILY MEDICINE

## 2022-07-26 PROCEDURE — 81001 URINALYSIS AUTO W/SCOPE: CPT | Performed by: FAMILY MEDICINE

## 2022-07-26 PROCEDURE — 99291 CRITICAL CARE FIRST HOUR: CPT | Mod: 25,CS

## 2022-07-26 PROCEDURE — 82077 ASSAY SPEC XCP UR&BREATH IA: CPT | Performed by: FAMILY MEDICINE

## 2022-07-26 PROCEDURE — 82550 ASSAY OF CK (CPK): CPT | Performed by: FAMILY MEDICINE

## 2022-07-26 PROCEDURE — 87088 URINE BACTERIA CULTURE: CPT | Performed by: FAMILY MEDICINE

## 2022-07-26 PROCEDURE — 87636 SARSCOV2 & INF A&B AMP PRB: CPT | Performed by: FAMILY MEDICINE

## 2022-07-26 PROCEDURE — 83605 ASSAY OF LACTIC ACID: CPT | Performed by: FAMILY MEDICINE

## 2022-07-26 PROCEDURE — 36415 COLL VENOUS BLD VENIPUNCTURE: CPT | Mod: 59 | Performed by: FAMILY MEDICINE

## 2022-07-26 PROCEDURE — 84484 ASSAY OF TROPONIN QUANT: CPT | Performed by: FAMILY MEDICINE

## 2022-07-26 PROCEDURE — 82553 CREATINE MB FRACTION: CPT | Performed by: FAMILY MEDICINE

## 2022-07-26 PROCEDURE — 25000003 PHARM REV CODE 250: Performed by: FAMILY MEDICINE

## 2022-07-26 PROCEDURE — 80143 DRUG ASSAY ACETAMINOPHEN: CPT | Performed by: EMERGENCY MEDICINE

## 2022-07-26 PROCEDURE — 85025 COMPLETE CBC W/AUTO DIFF WBC: CPT | Performed by: FAMILY MEDICINE

## 2022-07-26 PROCEDURE — 96360 HYDRATION IV INFUSION INIT: CPT

## 2022-07-26 PROCEDURE — 80053 COMPREHEN METABOLIC PANEL: CPT | Performed by: FAMILY MEDICINE

## 2022-07-26 PROCEDURE — 96361 HYDRATE IV INFUSION ADD-ON: CPT

## 2022-07-26 PROCEDURE — 63600175 PHARM REV CODE 636 W HCPCS: Performed by: FAMILY MEDICINE

## 2022-07-26 RX ORDER — CLONIDINE HYDROCHLORIDE 0.1 MG/1
TABLET ORAL
Status: ON HOLD | COMMUNITY
End: 2022-07-27

## 2022-07-26 RX ORDER — SODIUM CHLORIDE 9 MG/ML
1000 INJECTION, SOLUTION INTRAVENOUS
Status: COMPLETED | OUTPATIENT
Start: 2022-07-26 | End: 2022-07-26

## 2022-07-26 RX ORDER — SODIUM CHLORIDE, SODIUM LACTATE, POTASSIUM CHLORIDE, CALCIUM CHLORIDE 600; 310; 30; 20 MG/100ML; MG/100ML; MG/100ML; MG/100ML
INJECTION, SOLUTION INTRAVENOUS CONTINUOUS
Status: DISCONTINUED | OUTPATIENT
Start: 2022-07-26 | End: 2022-07-27 | Stop reason: HOSPADM

## 2022-07-26 RX ORDER — IMIQUIMOD 12.5 MG/.25G
CREAM TOPICAL
Status: ON HOLD | COMMUNITY
Start: 2022-06-09 | End: 2022-07-27

## 2022-07-26 RX ADMIN — SODIUM CHLORIDE 1000 ML: 9 INJECTION, SOLUTION INTRAVENOUS at 02:07

## 2022-07-26 RX ADMIN — SODIUM CHLORIDE, POTASSIUM CHLORIDE, SODIUM LACTATE AND CALCIUM CHLORIDE: 600; 310; 30; 20 INJECTION, SOLUTION INTRAVENOUS at 04:07

## 2022-07-26 RX ADMIN — SODIUM CHLORIDE 1000 ML: 9 INJECTION, SOLUTION INTRAVENOUS at 01:07

## 2022-07-26 RX ADMIN — SODIUM CHLORIDE, POTASSIUM CHLORIDE, SODIUM LACTATE AND CALCIUM CHLORIDE: 600; 310; 30; 20 INJECTION, SOLUTION INTRAVENOUS at 08:07

## 2022-07-26 RX ADMIN — SODIUM CHLORIDE, POTASSIUM CHLORIDE, SODIUM LACTATE AND CALCIUM CHLORIDE 500 ML: 600; 310; 30; 20 INJECTION, SOLUTION INTRAVENOUS at 02:07

## 2022-07-26 NOTE — ED PROVIDER NOTES
Encounter Date: 7/26/2022       History     Chief Complaint   Patient presents with    Dizziness    Nausea     Nausea and dizziness started after being diagnosed with covid on Thursday     This patient is a 81-year-old male comes in with family members who comes in with a complaint of nausea and dizziness after being diagnosed with COVID approximately 5 days ago.  Patient appears unsteady on his feet but as per family members this has been going on for a while even before patient was diagnosed with COVID.  He states that he does not really feel that bad but he is pretty weak and nauseous    The history is provided by the patient.   Dizziness  This is a new problem. The current episode started more than 2 days ago. The problem occurs constantly. The problem has not changed since onset.Pertinent negatives include no abdominal pain. Nothing aggravates the symptoms. Nothing relieves the symptoms. He has tried nothing for the symptoms.   Nausea  Pertinent negatives include no abdominal pain.     Review of patient's allergies indicates:  No Known Allergies  Past Medical History:   Diagnosis Date    Cancer     Cataract     CHF (congestive heart failure)     Coronary artery disease     Hypertension     Liver disease     Thyroid disease      Past Surgical History:   Procedure Laterality Date    ANKLE ARTHROSCOPY W/ OPEN REPAIR Left     CARDIAC SURGERY      EXCISION OF LESION Right 5/27/2022    Procedure: EXCISION, LESION;  Surgeon: Gwendolyn Joel MD;  Location: [facility];  Service: General;  Laterality: Right;    EYE SURGERY      left ear surgery Left     skin ca removed    SKIN BIOPSY       Family History   Family history unknown: Yes     Social History     Tobacco Use    Smoking status: Former Smoker     Types: Cigars    Smokeless tobacco: Never Used   Substance Use Topics    Alcohol use: Not Currently    Drug use: Never     Review of Systems   Constitutional: Negative.    HENT: Negative.    Respiratory:  Negative.    Cardiovascular: Negative.    Gastrointestinal: Positive for nausea. Negative for abdominal pain.   Endocrine: Negative.    Neurological: Positive for dizziness.   Psychiatric/Behavioral: Negative.    All other systems reviewed and are negative.      Physical Exam     Initial Vitals [07/26/22 1247]   BP Pulse Resp Temp SpO2   (!) 147/74 80 18 98.1 °F (36.7 °C) 98 %      MAP       --         Physical Exam    Nursing note and vitals reviewed.  Constitutional: He appears well-developed and well-nourished.   Patient is significantly unsteady on his feet, tremulous when asked to hold his leg up or hold his bilateral arms.  He does not have a reason for this but he states that he started to become tremulous after he started all the medicines a few months ago.  He states that he has does not use a wheelchair in his home but he has fallen several times.   HENT:   Head: Normocephalic.   Eyes: Pupils are equal, round, and reactive to light.   Neck:   Normal range of motion.  Cardiovascular: Normal rate and regular rhythm.   Patient is orthostatic upon arrival and after almost 4 L he is still orthostatic.   Pulmonary/Chest: Breath sounds normal.   Abdominal: Abdomen is soft. Bowel sounds are normal.   Musculoskeletal:         General: Normal range of motion.      Cervical back: Normal range of motion.     Neurological: He is alert and oriented to person, place, and time.   Skin: Skin is warm and dry.   Psychiatric: He has a normal mood and affect.         ED Course   Critical Care    Date/Time: 7/26/2022 6:18 PM  Performed by: Chang Cerrato MD  Authorized by: Chang Cerrato MD   Direct patient critical care time: 20 minutes  Additional history critical care time: 5 minutes  Ordering / reviewing critical care time: 5 minutes  Documentation critical care time: 7 minutes  Consult with family critical care time: 3 minutes  Total critical care time (exclusive of procedural time) : 40  minutes  Critical care was necessary to treat or prevent imminent or life-threatening deterioration of the following conditions: circulatory failure and shock.  Critical care was time spent personally by me on the following activities: blood draw for specimens, evaluation of patient's response to treatment, obtaining history from patient or surrogate, ordering and review of laboratory studies, pulse oximetry, examination of patient, ordering and performing treatments and interventions and ordering and review of radiographic studies.        Labs Reviewed   COMPREHENSIVE METABOLIC PANEL - Abnormal; Notable for the following components:       Result Value    Creatinine 1.40 (*)     Calcium Level Total 8.4 (*)     Albumin Level 3.2 (*)     Alanine Aminotransferase 1,029 (*)     Aspartate Aminotransferase 694 (*)     All other components within normal limits   CBC WITH DIFFERENTIAL - Abnormal; Notable for the following components:    RBC 4.33 (*)     Hgb 13.9 (*)     Hct 39.7 (*)     MCH 32.1 (*)     All other components within normal limits   COVID/FLU A&B PCR - Abnormal; Notable for the following components:    SARS-CoV-2 PCR Detected (*)     All other components within normal limits   COMPREHENSIVE METABOLIC PANEL - Abnormal; Notable for the following components:    Calcium Level Total 7.6 (*)     Protein Total 5.5 (*)     Albumin Level 2.9 (*)     Alanine Aminotransferase 915 (*)     Aspartate Aminotransferase 613 (*)     All other components within normal limits   URINALYSIS, REFLEX TO URINE CULTURE - Abnormal; Notable for the following components:    Appearance, UA SL CLOUDY (*)     Ketones, UA 1+ (*)     All other components within normal limits   TROPONIN I - Normal   CK-MB - Normal   CK - Normal   ALCOHOL,MEDICAL (ETHANOL) - Normal   LACTIC ACID, PLASMA - Normal   BLOOD CULTURE OLG   BLOOD CULTURE [facility]   CBC W/ AUTO DIFFERENTIAL    Narrative:     The following orders were created for panel order CBC auto  differential.  Procedure                               Abnormality         Status                     ---------                               -----------         ------                     CBC with Differential[[id number]]        Abnormal            Final result                 Please view results for these tests on the individual orders.   URINALYSIS, MICROSCOPIC     Results for orders placed or performed during the hospital encounter of 07/26/22   Comprehensive metabolic panel   Result Value Ref Range    Sodium Level 138 136 - 145 mmol/L    Potassium Level 4.4 3.5 - 5.1 mmol/L    Chloride 98 98 - 107 mmol/L    Carbon Dioxide 26 23 - 31 mmol/L    Glucose Level 94 82 - 115 mg/dL    Blood Urea Nitrogen 23.0 8.4 - 25.7 mg/dL    Creatinine 1.40 (H) 0.73 - 1.18 mg/dL    Calcium Level Total 8.4 (L) 8.8 - 10.0 mg/dL    Protein Total 6.2 5.8 - 7.6 gm/dL    Albumin Level 3.2 (L) 3.4 - 4.8 gm/dL    Globulin 3.0 2.4 - 3.5 gm/dL    Albumin/Globulin Ratio 1.1 1.1 - 2.0 ratio    Bilirubin Total 1.3 <=1.5 mg/dL    Alkaline Phosphatase 106 40 - 150 unit/L    Alanine Aminotransferase 1,029 (H) 0 - 55 unit/L    Aspartate Aminotransferase 694 (H) 5 - 34 unit/L    Estimated GFR-Non  52 mls/min/1.73/m2   Troponin I #1   Result Value Ref Range    Troponin-I 0.017 0.000 - 0.045 ng/mL   CK-MB   Result Value Ref Range    Creatine Kinase MB 2.9 <=7.2 ng/mL   CK   Result Value Ref Range    Creatine Kinase 199 30 - 200 U/L   CBC with Differential   Result Value Ref Range    WBC 5.7 4.5 - 11.5 x10(3)/mcL    RBC 4.33 (L) 4.70 - 6.10 x10(6)/mcL    Hgb 13.9 (L) 14.0 - 18.0 gm/dL    Hct 39.7 (L) 42.0 - 52.0 %    MCV 91.7 80.0 - 94.0 fL    MCH 32.1 (H) 27.0 - 31.0 pg    MCHC 35.0 33.0 - 36.0 mg/dL    RDW 11.8 11.5 - 17.0 %    Platelet 254 130 - 400 x10(3)/mcL    MPV 9.3 7.4 - 10.4 fL    Neut % 67.8 %    Lymph % 19.0 %    Mono % 11.8 %    Eos % 0.7 %    Basophil % 0.2 %    Lymph # 1.08 0.6 - 4.6 x10(3)/mcL    Neut # 3.8 2.1 - 9.2  x10(3)/mcL    Mono # 0.67 0.1 - 1.3 x10(3)/mcL    Eos # 0.04 0 - 0.9 x10(3)/mcL    Baso # 0.01 0 - 0.2 x10(3)/mcL    IG# 0.03 0 - 0.04 x10(3)/mcL    IG% 0.5 %    NRBC% 0.0 %   COVID/FLU A&B PCR   Result Value Ref Range    Influenza A PCR Not Detected Not Detected    Influenza B PCR Not Detected Not Detected    SARS-CoV-2 PCR Detected (A) Not Detected   Ethanol   Result Value Ref Range    Ethanol Level <10.0 <=10.0 mg/dL   Lactic acid, plasma   Result Value Ref Range    Lactic Acid Level 1.6 0.5 - 2.2 mmol/L   Comprehensive metabolic panel   Result Value Ref Range    Sodium Level 139 136 - 145 mmol/L    Potassium Level 3.9 3.5 - 5.1 mmol/L    Chloride 103 98 - 107 mmol/L    Carbon Dioxide 23 23 - 31 mmol/L    Glucose Level 87 82 - 115 mg/dL    Blood Urea Nitrogen 20.0 8.4 - 25.7 mg/dL    Creatinine 1.11 0.73 - 1.18 mg/dL    Calcium Level Total 7.6 (L) 8.8 - 10.0 mg/dL    Protein Total 5.5 (L) 5.8 - 7.6 gm/dL    Albumin Level 2.9 (L) 3.4 - 4.8 gm/dL    Globulin 2.6 2.4 - 3.5 gm/dL    Albumin/Globulin Ratio 1.1 1.1 - 2.0 ratio    Bilirubin Total 1.1 <=1.5 mg/dL    Alkaline Phosphatase 97 40 - 150 unit/L    Alanine Aminotransferase 915 (H) 0 - 55 unit/L    Aspartate Aminotransferase 613 (H) 5 - 34 unit/L    Estimated GFR-Non  >60 mls/min/1.73/m2   Urinalysis, Reflex to Urine Culture Urine, Clean Catch    Specimen: Urine   Result Value Ref Range    Color, UA Yellow Yellow, Colorless, Other, Clear    Appearance, UA SL CLOUDY (A) Clear    Specific Gravity, UA 1.015     pH, UA 5.5 5.0, 5.5, 6.0, 6.5, 7.0, 7.5, 8.0, 8.5    Protein, UA Negative Negative, 300  mg/dL    Glucose, UA Negative Negative, Normal mg/dL    Ketones, UA 1+ (A) Negative, +1, +2, +3, +4, +5, >=160, >=80 mg/dL    Blood, UA Negative Negative unit/L    Bilirubin, UA Negative Negative mg/dL    Urobilinogen, UA 0.2 0.2, 1.0, Normal mg/dL    Nitrites, UA Negative Negative    Leukocyte Esterase, UA Negative Negative, 75  unit/L   Urinalysis,  Microscopic   Result Value Ref Range    Bacteria, UA Rare None Seen, Rare, Occasional /HPF    Hyaline Casts, UA Rare (A) None Seen /HPF    Mucous, UA Small (A) None Seen /LPF    Renal Epithelial Cells, UA Few (A) None Seen /HPF    RBC, UA 0-2 None Seen, 0-2, 3-5, 0-5 /HPF    WBC, UA 3-5 None Seen, 0-2, 3-5, 0-5 /HPF    Squamous Epithelial Cells, UA Few (A) None Seen, Rare, Occasional, Occ /HPF           EKG Readings: (Independently Interpreted)   Initial Reading: No STEMI. Rhythm: Paced Rhythm. Heart Rate: 76. Ectopy: No Ectopy. Conduction: Normal. ST Segments: Normal ST Segments. Axis: Normal. Q Waves: II and III. Clinical Impression: Paced Rhythm     ECG Results          EKG 12-lead (Final result)  Result time 07/26/22 14:06:10    Final result by Interface, Lab In [facility] (07/26/22 14:06:10)                 Narrative:    Test Reason : R42,    Vent. Rate : 076 BPM     Atrial Rate : 076 BPM     P-R Int : 274 ms          QRS Dur : 112 ms      QT Int : 428 ms       P-R-T Axes : 079 006 137 degrees     QTc Int : 481 ms    Atrial-paced rhythm with prolonged AV conduction  Inferior infarct ,age undetermined  Abnormal ECG  Confirmed by Danny Bright MD ([id number]) on 7/26/2022 2:05:59 PM    Referred By: [email or web address]   SELF           Confirmed By:Danny Bright MD                            Imaging Results          CT Head Without Contrast (Final result)  Result time 07/26/22 16:40:58    Final result by Earle Sinclair MD (07/26/22 16:40:58)                 Impression:        1. No acute intracranial abnormality.  2. 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  3. Additional chronic secondary details discussed above.      Electronically signed by: Earle Sinclair  Date:    07/26/2022  Time:    16:40             Narrative:    EXAMINATION:  CT HEAD WITHOUT CONTRAST    CLINICAL HISTORY:  ; Dizziness, persistent/recurrent, cardiac or vascular cause suspected;    TECHNIQUE:  Axial CT images were  acquired without the intravenous administration of iodine-based contrast media.  Multiplanar reconstructions were accomplished by a CT technologist at a separate workstation and pushed to PACS for physician review.    Automated tube current modulation, weight-based exposure dosing, and/or iterative reconstruction technique utilized to reach lowest reasonably achievable exposure rate.  DLP: 1003 mGy*cm    COMPARISON:  None available at the time of the initial interpretation.    FINDINGS:  Images were reviewed in subdural, brain, soft tissue, and bone windows.    Exam quality: Motion/streak artifact limits assessment of the posterior fossa.    Hemorrhage:No evidence of acute hyperattenuating blood products.    Parenchyma: There is diffuse white matter hypoattenuation, typical of chronic microvascular changes. No discrete mass, mass effect, or CT evidence of acute large vascular territory insult. Gray-white differentiation is preserved.    Midline shift: None.    CSF spaces: Proportional appearance of ventricular and sulcal enlargement. No hydrocephalus. No masses or fluid collections.    Skull base: Mastoid air cells are well aerated. No focal abnormality. No suspicious findings of the sella.    Scalp/Skull: No abnormalities.    Vasculature: No focally hyperdense artery. Scattered carotid siphon calcifications are present. No abnormal densities within the dural sinuses.    Facial structures: Unremarkable.                                 X-Ray Chest AP Portable (Final result)  Result time 07/26/22 14:23:55    Final result by Michael El MD (07/26/22 14:23:55)                 Impression:      No acute thoracic abnormality.    No significant interval change.      Electronically signed by: Michael El  Date:    07/26/2022  Time:    14:23             Narrative:    EXAMINATION:  XR CHEST AP PORTABLE    CLINICAL HISTORY:  Chest Pain;;    COMPARISON:  02/27/2020    FINDINGS:  No focal consolidations, pleural effusions  or pneumothoraces.    Dual lead left chest cardiac device with unchanged lead projection.    Post CABG changes.    Enlarged cardiac silhouette without overt decompensation.    No acute bony pathology.    Soft tissues within normal limits.                                 Medications   lactated ringers infusion ( Intravenous New Bag 7/26/22 1604)   0.9%  NaCl infusion (0 mLs Intravenous Stopped 7/26/22 1413)   sodium chloride 0.9% bolus 1,000 mL (0 mLs Intravenous Stopped 7/26/22 1514)   lactated ringers bolus 500 mL (0 mLs Intravenous Stopped 7/26/22 1530)     Medical Decision Making:   Initial Assessment:   Orthostatic hypotension, tremors, dizziness  Differential Diagnosis:   COVID, flu, dehydration,  Clinical Tests:   Lab Tests: Ordered and Reviewed  Radiological Study: Ordered and Reviewed  ED Management:  For full workup including cardiac enzymes.  His liver enzymes were found to be very high which were normal in March of 2022. Lactic acid was within normal limits although his blood pressure is still low after almost 4 L of fluid.  We consider in the best interest of the patient to be admitted but we do not have capacity at this hospital so we will put an order for transfer at this time so that he get fluids and his liver enzymes can be watched.  Patient states he has had an outpatient workup regarding the tremors although they were much worse when he initially came in to the emergency room and are better now that he is rehydrated.  Patient was unable to empty his bladder well in the ER, replace the Bee after stating that he had approximately 500 mL of urine in his bladder and he was only able to urinate 125.   Care handed off to me while attempting to arrange transfer. Hospital at capacity. Chart reviewed. RUQ U/S, acetaminophen, and INR added on for elevated LFTs. Dr. Garcia at [facility] accepted transfer. Those add on tests not resulted yet. Patient will be transferred via ambulance                       Clinical Impression:   Final diagnoses:  [R42] Dizziness  [E86.0] Dehydration  [U07.1] COVID-19 (Primary)  [R74.8] Elevated liver enzymes  [R33.9] Urinary retention          ED Disposition Condition    Transfer to Another Facility Stable              Lance Miller MD  07/26/22 2100

## 2022-07-27 ENCOUNTER — HOSPITAL ENCOUNTER (INPATIENT)
Facility: HOSPITAL | Age: 82
LOS: 4 days | DRG: 179 | End: 2022-08-02
Attending: SPECIALIST | Admitting: STUDENT IN AN ORGANIZED HEALTH CARE EDUCATION/TRAINING PROGRAM
Payer: MEDICARE

## 2022-07-27 DIAGNOSIS — U07.1 COVID-19 VIRUS INFECTION: Primary | ICD-10-CM

## 2022-07-27 DIAGNOSIS — I95.1 ORTHOSTATIC HYPOTENSION: ICD-10-CM

## 2022-07-27 DIAGNOSIS — R74.8 ELEVATED LIVER ENZYMES: ICD-10-CM

## 2022-07-27 DIAGNOSIS — R55 SYNCOPE AND COLLAPSE: ICD-10-CM

## 2022-07-27 PROBLEM — R29.6 RECURRENT FALLS: Status: ACTIVE | Noted: 2022-07-27

## 2022-07-27 LAB
ALBUMIN SERPL-MCNC: 2.3 GM/DL (ref 3.4–4.8)
ALBUMIN/GLOB SERPL: 0.8 RATIO (ref 1.1–2)
ALP SERPL-CCNC: 87 UNIT/L (ref 40–150)
ALT SERPL-CCNC: 752 UNIT/L (ref 0–55)
AST SERPL-CCNC: 469 UNIT/L (ref 5–34)
BASOPHILS # BLD AUTO: 0.01 X10(3)/MCL (ref 0–0.2)
BASOPHILS NFR BLD AUTO: 0.1 %
BILIRUBIN DIRECT+TOT PNL SERPL-MCNC: 1 MG/DL
BUN SERPL-MCNC: 15.2 MG/DL (ref 8.4–25.7)
CALCIUM SERPL-MCNC: 8.2 MG/DL (ref 8.8–10)
CHLORIDE SERPL-SCNC: 101 MMOL/L (ref 98–107)
CO2 SERPL-SCNC: 27 MMOL/L (ref 23–31)
CREAT SERPL-MCNC: 0.9 MG/DL (ref 0.73–1.18)
EOSINOPHIL # BLD AUTO: 0.03 X10(3)/MCL (ref 0–0.9)
EOSINOPHIL NFR BLD AUTO: 0.3 %
ERYTHROCYTE [DISTWIDTH] IN BLOOD BY AUTOMATED COUNT: 11.7 % (ref 11.5–17)
GLOBULIN SER-MCNC: 2.8 GM/DL (ref 2.4–3.5)
GLUCOSE SERPL-MCNC: 124 MG/DL (ref 82–115)
HCT VFR BLD AUTO: 35.6 % (ref 42–52)
HGB BLD-MCNC: 11.9 GM/DL (ref 14–18)
IMM GRANULOCYTES # BLD AUTO: 0.03 X10(3)/MCL (ref 0–0.04)
IMM GRANULOCYTES NFR BLD AUTO: 0.3 %
LYMPHOCYTES # BLD AUTO: 1.07 X10(3)/MCL (ref 0.6–4.6)
LYMPHOCYTES NFR BLD AUTO: 9.7 %
MAGNESIUM SERPL-MCNC: 1.9 MG/DL (ref 1.6–2.6)
MCH RBC QN AUTO: 31.6 PG (ref 27–31)
MCHC RBC AUTO-ENTMCNC: 33.4 MG/DL (ref 33–36)
MCV RBC AUTO: 94.7 FL (ref 80–94)
MONOCYTES # BLD AUTO: 0.73 X10(3)/MCL (ref 0.1–1.3)
MONOCYTES NFR BLD AUTO: 6.6 %
NEUTROPHILS # BLD AUTO: 9.2 X10(3)/MCL (ref 2.1–9.2)
NEUTROPHILS NFR BLD AUTO: 83 %
PLATELET # BLD AUTO: 225 X10(3)/MCL (ref 130–400)
PMV BLD AUTO: 9.1 FL (ref 7.4–10.4)
POTASSIUM SERPL-SCNC: 3.6 MMOL/L (ref 3.5–5.1)
PROT SERPL-MCNC: 5.1 GM/DL (ref 5.8–7.6)
RBC # BLD AUTO: 3.76 X10(6)/MCL (ref 4.7–6.1)
SODIUM SERPL-SCNC: 136 MMOL/L (ref 136–145)
WBC # SPEC AUTO: 11.1 X10(3)/MCL (ref 4.5–11.5)

## 2022-07-27 PROCEDURE — 99285 EMERGENCY DEPT VISIT HI MDM: CPT

## 2022-07-27 PROCEDURE — G0378 HOSPITAL OBSERVATION PER HR: HCPCS

## 2022-07-27 PROCEDURE — 36415 COLL VENOUS BLD VENIPUNCTURE: CPT | Performed by: STUDENT IN AN ORGANIZED HEALTH CARE EDUCATION/TRAINING PROGRAM

## 2022-07-27 PROCEDURE — 97161 PT EVAL LOW COMPLEX 20 MIN: CPT

## 2022-07-27 PROCEDURE — G0379 DIRECT REFER HOSPITAL OBSERV: HCPCS

## 2022-07-27 PROCEDURE — 25000003 PHARM REV CODE 250: Performed by: STUDENT IN AN ORGANIZED HEALTH CARE EDUCATION/TRAINING PROGRAM

## 2022-07-27 PROCEDURE — 80053 COMPREHEN METABOLIC PANEL: CPT | Performed by: STUDENT IN AN ORGANIZED HEALTH CARE EDUCATION/TRAINING PROGRAM

## 2022-07-27 PROCEDURE — 96372 THER/PROPH/DIAG INJ SC/IM: CPT | Performed by: STUDENT IN AN ORGANIZED HEALTH CARE EDUCATION/TRAINING PROGRAM

## 2022-07-27 PROCEDURE — 97166 OT EVAL MOD COMPLEX 45 MIN: CPT

## 2022-07-27 PROCEDURE — 83735 ASSAY OF MAGNESIUM: CPT | Performed by: STUDENT IN AN ORGANIZED HEALTH CARE EDUCATION/TRAINING PROGRAM

## 2022-07-27 PROCEDURE — 63600175 PHARM REV CODE 636 W HCPCS: Performed by: STUDENT IN AN ORGANIZED HEALTH CARE EDUCATION/TRAINING PROGRAM

## 2022-07-27 PROCEDURE — 85025 COMPLETE CBC W/AUTO DIFF WBC: CPT | Performed by: STUDENT IN AN ORGANIZED HEALTH CARE EDUCATION/TRAINING PROGRAM

## 2022-07-27 RX ORDER — FAMOTIDINE 20 MG/1
20 TABLET, FILM COATED ORAL 2 TIMES DAILY
Status: DISCONTINUED | OUTPATIENT
Start: 2022-07-27 | End: 2022-08-02 | Stop reason: HOSPADM

## 2022-07-27 RX ORDER — LEVOTHYROXINE SODIUM 125 UG/1
125 TABLET ORAL DAILY
Status: DISCONTINUED | OUTPATIENT
Start: 2022-07-27 | End: 2022-08-02 | Stop reason: HOSPADM

## 2022-07-27 RX ORDER — FUROSEMIDE 20 MG/1
20 TABLET ORAL DAILY
Status: DISCONTINUED | OUTPATIENT
Start: 2022-07-28 | End: 2022-08-02 | Stop reason: HOSPADM

## 2022-07-27 RX ORDER — ZINC SULFATE 50(220)MG
220 CAPSULE ORAL DAILY
Status: DISCONTINUED | OUTPATIENT
Start: 2022-07-27 | End: 2022-08-02 | Stop reason: HOSPADM

## 2022-07-27 RX ORDER — ATORVASTATIN CALCIUM 40 MG/1
40 TABLET, FILM COATED ORAL NIGHTLY
Status: DISCONTINUED | OUTPATIENT
Start: 2022-07-27 | End: 2022-08-02 | Stop reason: HOSPADM

## 2022-07-27 RX ORDER — NAPROXEN SODIUM 220 MG/1
81 TABLET, FILM COATED ORAL DAILY
Status: DISCONTINUED | OUTPATIENT
Start: 2022-07-27 | End: 2022-08-02 | Stop reason: HOSPADM

## 2022-07-27 RX ORDER — RANOLAZINE 500 MG/1
500 TABLET, EXTENDED RELEASE ORAL 2 TIMES DAILY
Status: DISCONTINUED | OUTPATIENT
Start: 2022-07-27 | End: 2022-08-02 | Stop reason: HOSPADM

## 2022-07-27 RX ORDER — POLYETHYLENE GLYCOL 3350 17 G/17G
17 POWDER, FOR SOLUTION ORAL 3 TIMES DAILY PRN
Status: DISCONTINUED | OUTPATIENT
Start: 2022-07-27 | End: 2022-08-02 | Stop reason: HOSPADM

## 2022-07-27 RX ORDER — TALC
9 POWDER (GRAM) TOPICAL NIGHTLY PRN
Status: DISCONTINUED | OUTPATIENT
Start: 2022-07-27 | End: 2022-08-02 | Stop reason: HOSPADM

## 2022-07-27 RX ORDER — SODIUM CHLORIDE, SODIUM LACTATE, POTASSIUM CHLORIDE, CALCIUM CHLORIDE 600; 310; 30; 20 MG/100ML; MG/100ML; MG/100ML; MG/100ML
INJECTION, SOLUTION INTRAVENOUS CONTINUOUS
Status: DISCONTINUED | OUTPATIENT
Start: 2022-07-27 | End: 2022-07-28

## 2022-07-27 RX ORDER — ENOXAPARIN SODIUM 100 MG/ML
40 INJECTION SUBCUTANEOUS EVERY 24 HOURS
Status: DISCONTINUED | OUTPATIENT
Start: 2022-07-27 | End: 2022-08-02 | Stop reason: HOSPADM

## 2022-07-27 RX ORDER — AMOXICILLIN 250 MG
1 CAPSULE ORAL 2 TIMES DAILY
Status: DISCONTINUED | OUTPATIENT
Start: 2022-07-27 | End: 2022-08-02 | Stop reason: HOSPADM

## 2022-07-27 RX ORDER — AMIODARONE HYDROCHLORIDE 200 MG/1
200 TABLET ORAL 2 TIMES DAILY
Status: DISCONTINUED | OUTPATIENT
Start: 2022-07-27 | End: 2022-08-02 | Stop reason: HOSPADM

## 2022-07-27 RX ORDER — FUROSEMIDE 40 MG/1
40 TABLET ORAL DAILY
Status: DISCONTINUED | OUTPATIENT
Start: 2022-07-27 | End: 2022-07-27

## 2022-07-27 RX ORDER — METOPROLOL SUCCINATE 50 MG/1
50 TABLET, EXTENDED RELEASE ORAL DAILY
Status: DISCONTINUED | OUTPATIENT
Start: 2022-07-27 | End: 2022-07-28

## 2022-07-27 RX ORDER — SODIUM CHLORIDE 0.9 % (FLUSH) 0.9 %
2 SYRINGE (ML) INJECTION EVERY 6 HOURS PRN
Status: DISCONTINUED | OUTPATIENT
Start: 2022-07-27 | End: 2022-08-02 | Stop reason: HOSPADM

## 2022-07-27 RX ORDER — ACETAMINOPHEN 325 MG/1
650 TABLET ORAL EVERY 8 HOURS PRN
Status: DISCONTINUED | OUTPATIENT
Start: 2022-07-27 | End: 2022-07-27

## 2022-07-27 RX ORDER — ACETAMINOPHEN 325 MG/1
650 TABLET ORAL EVERY 4 HOURS PRN
Status: DISCONTINUED | OUTPATIENT
Start: 2022-07-27 | End: 2022-08-02 | Stop reason: HOSPADM

## 2022-07-27 RX ORDER — TAMSULOSIN HYDROCHLORIDE 0.4 MG/1
0.4 CAPSULE ORAL DAILY
Status: DISCONTINUED | OUTPATIENT
Start: 2022-07-27 | End: 2022-08-02 | Stop reason: HOSPADM

## 2022-07-27 RX ORDER — CHOLECALCIFEROL (VITAMIN D3) 25 MCG
1000 TABLET ORAL DAILY
Status: DISCONTINUED | OUTPATIENT
Start: 2022-07-27 | End: 2022-08-02 | Stop reason: HOSPADM

## 2022-07-27 RX ORDER — HYDROCODONE BITARTRATE AND ACETAMINOPHEN 5; 325 MG/1; MG/1
1 TABLET ORAL EVERY 6 HOURS PRN
Status: DISCONTINUED | OUTPATIENT
Start: 2022-07-27 | End: 2022-08-02 | Stop reason: HOSPADM

## 2022-07-27 RX ORDER — TALC
6 POWDER (GRAM) TOPICAL NIGHTLY PRN
Status: DISCONTINUED | OUTPATIENT
Start: 2022-07-27 | End: 2022-07-27

## 2022-07-27 RX ORDER — ONDANSETRON 4 MG/1
8 TABLET, ORALLY DISINTEGRATING ORAL EVERY 8 HOURS PRN
Status: DISCONTINUED | OUTPATIENT
Start: 2022-07-27 | End: 2022-08-02 | Stop reason: HOSPADM

## 2022-07-27 RX ORDER — ASCORBIC ACID 500 MG
500 TABLET ORAL DAILY
Status: DISCONTINUED | OUTPATIENT
Start: 2022-07-27 | End: 2022-08-02 | Stop reason: HOSPADM

## 2022-07-27 RX ADMIN — ZINC SULFATE 220 MG (50 MG) CAPSULE 220 MG: CAPSULE at 01:07

## 2022-07-27 RX ADMIN — OXYCODONE HYDROCHLORIDE AND ACETAMINOPHEN 500 MG: 500 TABLET ORAL at 01:07

## 2022-07-27 RX ADMIN — SACUBITRIL AND VALSARTAN 1 TABLET: 24; 26 TABLET, FILM COATED ORAL at 09:07

## 2022-07-27 RX ADMIN — FAMOTIDINE 20 MG: 20 TABLET ORAL at 09:07

## 2022-07-27 RX ADMIN — SPIRONOLACTONE 12.5 MG: 25 TABLET, FILM COATED ORAL at 04:07

## 2022-07-27 RX ADMIN — LEVOTHYROXINE SODIUM 125 MCG: 125 TABLET ORAL at 04:07

## 2022-07-27 RX ADMIN — TAMSULOSIN HYDROCHLORIDE 0.4 MG: 0.4 CAPSULE ORAL at 09:07

## 2022-07-27 RX ADMIN — ASPIRIN 81 MG CHEWABLE TABLET 81 MG: 81 TABLET CHEWABLE at 04:07

## 2022-07-27 RX ADMIN — AMIODARONE HYDROCHLORIDE 200 MG: 200 TABLET ORAL at 09:07

## 2022-07-27 RX ADMIN — METOPROLOL SUCCINATE 50 MG: 50 TABLET, FILM COATED, EXTENDED RELEASE ORAL at 04:07

## 2022-07-27 RX ADMIN — RANOLAZINE 500 MG: 500 TABLET, EXTENDED RELEASE ORAL at 09:07

## 2022-07-27 RX ADMIN — Medication 1000 UNITS: at 01:07

## 2022-07-27 RX ADMIN — ENOXAPARIN SODIUM 40 MG: 100 INJECTION SUBCUTANEOUS at 04:07

## 2022-07-27 RX ADMIN — SENNOSIDES, DOCUSATE SODIUM 1 TABLET: 8.6; 5 TABLET ORAL at 09:07

## 2022-07-27 RX ADMIN — SODIUM CHLORIDE, POTASSIUM CHLORIDE, SODIUM LACTATE AND CALCIUM CHLORIDE: 600; 310; 30; 20 INJECTION, SOLUTION INTRAVENOUS at 04:07

## 2022-07-27 RX ADMIN — ATORVASTATIN CALCIUM 40 MG: 40 TABLET, FILM COATED ORAL at 09:07

## 2022-07-27 NOTE — PLAN OF CARE
[facility] - [facility]  Initial Discharge Assessment       Primary Care Provider: Fernandez Castillo MD    Admission Diagnosis: Orthostatic hypotension [I95.1]  Elevated liver enzymes [R74.8]  COVID-19 virus infection [U07.1]    Admission Date: 7/27/2022  Expected Discharge Date:     Discharge Barriers Identified: None    Payor: MEDICARE / Plan: MEDICARE PART A & B / Product Type: Government /     Extended Emergency Contact Information  Primary Emergency Contact: Dora Causey  Mobile Phone: [phone]  Relation: Daughter  Preferred language: English   needed? No  Secondary Emergency Contact: Adal Gaona III  Mobile Phone: [phone]  Relation: Son   needed? No    Discharge Plan A: Home with family, Home Health         [facility]  [address] [facility] [address]  Phone: [phone] Fax: [phone]      Initial Assessment (most recent)     Adult Discharge Assessment - 07/27/22 0818        Discharge Assessment    Assessment Type Discharge Planning Assessment     Confirmed/corrected address, phone number and insurance Yes     Confirmed Demographics Correct on Facesheet     Source of Information family     If unable to respond/provide information was family/caregiver contacted? Yes     Contact Name/Number Dora Singleton Daughter [phone]     Does patient/caregiver understand observation status Yes     Communicated AUSTYN with patient/caregiver Date not available/Unable to determine     Reason For Admission COVID     Lives With alone     Facility Arrived From: [facility]     Do you expect to return to your current living situation? Yes     Do you have help at home or someone to help you manage your care at home? Yes     Who are your caregiver(s) and their phone number(s)? Dora Singleton Daughter [phone]     Prior to hospitilization cognitive status: Alert/Oriented     Current cognitive status: Alert/Oriented     Walking or  Climbing Stairs Difficulty none     Dressing/Bathing Difficulty none     Home Accessibility wheelchair accessible     Home Layout Able to live on 1st floor     Equipment Currently Used at Home none     Readmission within 30 days? No     Patient currently being followed by outpatient case management? No     Do you currently have service(s) that help you manage your care at home? No     Do you take prescription medications? Yes     Do you have prescription coverage? Yes     Do you have any problems affording any of your prescribed medications? TBD     Is the patient taking medications as prescribed? yes     Who is going to help you get home at discharge? Dora Singleton Daughter [phone]     How do you get to doctors appointments? family or friend will provide     Are you on dialysis? No     Do you take coumadin? No     Discharge Plan A Home with family;Home Health     DME Needed Upon Discharge  none     Discharge Plan discussed with: Adult children     Discharge Barriers Identified None

## 2022-07-27 NOTE — ASSESSMENT & PLAN NOTE
-downtrending  -suspect ischemic hepatitis 2/2 hypotension  -Right upper quadrant ultrasound showed hepatic steatosis, normal caliber common bile duct, negative Rivers's

## 2022-07-27 NOTE — PLAN OF CARE
Problem: Physical Therapy  Goal: Physical Therapy Goal  Description: Goals to be met by: Discharge     Patient will increase functional independence with mobility by performin. Supine to sit with Modified [place]  2. Sit to supine with Modified [place]  3. Sit to stand transfer with Modified [place]  4. Bed to chair transfer with Modified [place] using Rolling Walker vs LRAD  5. Gait  x 200 feet with Modified [place] using Rolling Walker vs LRAD.   6. Ascend/descend 5 stair with left Handrails Stand-by Assistance using No Assistive Device.     Outcome: Ongoing, Progressing

## 2022-07-27 NOTE — ASSESSMENT & PLAN NOTE
-CT head showing chronic microvascular changes  -attempting to find out patient's brand dual lead left chest cardiac device for interrogation purposes  -PT/OT

## 2022-07-27 NOTE — PT/OT/SLP EVAL
Physical Therapy Acute Care Evaluation    Patient Name:  Adal Phoenix   MRN:  [id number]    History:     Past Medical History:   Diagnosis Date    Cancer     Cataract     CHF (congestive heart failure)     Coronary artery disease     Hypertension     Liver disease     Thyroid disease        Past Surgical History:   Procedure Laterality Date    ANKLE ARTHROSCOPY W/ OPEN REPAIR Left     CARDIAC SURGERY      EXCISION OF LESION Right 5/27/2022    Procedure: EXCISION, LESION;  Surgeon: Gwendolyn Joel MD;  Location: [facility];  Service: General;  Laterality: Right;    EYE SURGERY      left ear surgery Left     skin ca removed    SKIN BIOPSY           Subjective     Chief Complaint: Dizziness with transitional movements - orthostatic hypotension; frequent falls at home  Patient/Family Comments/goals: To return home safely and at baseline  Pain/Comfort:  · Pain Rating 1: 0/10      Living Environment:  Lives with: Alone with multiple family members nearby (who work out of state and are barely home)  Home Environment: Single story home with 4-5 steps to enter and handrail on L  Previous level of function: Independent with mobility and ADLs; still doing yardwork and minimal cooking; daughter reports frequent falls over the past few weeks  Equipment used at home: wheelchair, rollator, shower chair.        Objective:     Communicated with nursing prior to session.  Patient found supine with bed alarm  upon PT entry to room.    General Precautions: Standard, fall, airborne, contact, droplet   Orthopedic Precautions:    Braces:    Respiratory Status: Room air     Vitals Value    Room air      Oxygen (L)     Blood pressure     Heart rate         Exams:  · RLE ROM: WFL except tight hamstrings  · RLE Strength: WFL  · LLE ROM: WFL except tight hamstrings L>R  · LLE Strength: WFL    Functional Mobility:  · Bed Mobility:     · Supine to Sit: stand by assistance  · Sit to Supine: moderate assistance  · Balance: SBA sitting EOB -  static sitting EOB SBA, but when testing MMT BLE, pt began to lose trunk control posterior to compensate for bilaterally tight hamstring muscles requiring steadying from therapist    Therapeutic Activities and Exercises:       Additional information:   Patient admitted d/t orthostatic hypotension  With supine > sit transition pt became symptomatic and BP assessed and found to be 97/60mmHG  Due to already low BP, sit to stand transition not safe to attempt at this time and pt returned to supine in which symptoms resolved    Patient left HOB elevated with all lines intact, call button in reach, bed alarm on and daughter present.      Assessment:     Adal Phoenix is a 81 y.o. male admitted with a medical diagnosis of COVID-19 virus infection.  He presents with the following impairments/functional limitations:  weakness, impaired balance, decreased safety awareness, impaired endurance, impaired cardiopulmonary response to activity, decreased ROM, impaired functional mobility, impaired muscle length, gait instability, decreased lower extremity function. Unable to fully assess patient's functional abilities d/t orthostatic hypotension when sitting EOB. Will continue to assess and progress patient as BP issues resolve.     Rehab Prognosis: Good; patient would benefit from acute skilled PT services to address these deficits and reach maximum level of function.    Recent Surgery: * No surgery found *        Recommendations:     Discharge Recommendations:  home with home health   Discharge Equipment Recommendations: walker, rolling, bedside commode       Plan:     During this hospitalization, patient to be seen 6 x/week to address the identified rehab impairments via gait training, therapeutic activities, therapeutic exercises and progress toward the following goals:    GOALS:   Multidisciplinary Problems     Physical Therapy Goals        Problem: Physical Therapy    Goal Priority Disciplines Outcome Goal Variances  Interventions   Physical Therapy Goal     PT, PT/OT Ongoing, Progressing     Description: Goals to be met by: Discharge     Patient will increase functional independence with mobility by performin. Supine to sit with Modified [place]  2. Sit to supine with Modified [place]  3. Sit to stand transfer with Modified [place]  4. Bed to chair transfer with Modified [place] using Rolling Walker vs LRAD  5. Gait  x 200 feet with Modified [place] using Rolling Walker vs LRAD.   6. Ascend/descend 5 stair with left Handrails Stand-by Assistance using No Assistive Device.                      Time Tracking:       PT Start Time: 1350     PT Stop Time: 1410  PT Total Time (min): 20 min     Billable Minutes: Evaluation Min complexity      2022

## 2022-07-27 NOTE — PLAN OF CARE
Problem: Occupational Therapy  Goal: Occupational Therapy Goal  Description: Goals to be met by: 8/4/22     Patient will increase functional independence with ADLs by performing:    Toileting from bedside commode with Contact Guard Assistance for hygiene and clothing management.   Toilet transfer to bedside commode with Contact Guard Assistance.    Outcome: Ongoing, Progressing

## 2022-07-27 NOTE — H&P
[facility]  [facility] Medicine  History & Physical    Patient Name: Adal Phoenix  MRN: [id number]  Patient Class: OP- Observation  Admission Date: 7/27/2022  Attending Physician: Thairy G Reyes, DO   Primary Care Provider: Fernandez Castillo MD         Patient information was obtained from patient, past medical records and ER records.     Subjective:     Principal Problem:COVID-19 virus infection    Chief Complaint:   Chief Complaint   Patient presents with    COVID-19 Concerns    Cough    Dizziness        HPI: 81-year-old male with a past medical history of prior MI status post PCI, hypertension, hyperlipidemia who was transferred from [facility] as they had no beds for admission; he was diagnosed with COVID infection 5 days ago and since then he has had a headache, nausea and dizziness and states he just has not felt well; also with a cough and clear sputum; he was to be admitted to [place] but they had no beds and fell because his blood pressure showed orthostatic hypotension he should be admitted.  Patient also with significantly elevated LFTs compared to baseline a few months ago.  Right upper quadrant ultrasound unremarkable, suspicion for ischemic hepatitis.  During eval here pt states he presented with complaint of passing out. Patient reports for the last 2 weeks his knees have been giving out under him and he falls down however last night for the 1st time he does not recall events leading up to his fall.  Patient does affirm to living alone.  CT head without contrast showed chronic microvascular changes.  Chest x-ray showed no acute thoracic abnormality.      Past Medical History:   Diagnosis Date    Cancer     Cataract     CHF (congestive heart failure)     Coronary artery disease     Hypertension     Liver disease     Thyroid disease        Past Surgical History:   Procedure Laterality Date    ANKLE ARTHROSCOPY W/ OPEN REPAIR Left     CARDIAC SURGERY       EXCISION OF LESION Right 5/27/2022    Procedure: EXCISION, LESION;  Surgeon: Gwendolyn Joel MD;  Location: Replaced by [facility];  Service: General;  Laterality: Right;    EYE SURGERY      left ear surgery Left     skin ca removed    SKIN BIOPSY         Review of patient's allergies indicates:  No Known Allergies    Current Facility-Administered Medications on File Prior to Encounter   Medication    [COMPLETED] 0.9%  NaCl infusion    [COMPLETED] lactated ringers bolus 500 mL    [COMPLETED] sodium chloride 0.9% bolus 1,000 mL    [DISCONTINUED] lactated ringers infusion     Current Outpatient Medications on File Prior to Encounter   Medication Sig    amiodarone (PACERONE) 200 MG Tab Take 200 mg by mouth 2 (two) times a day.    aspirin 81 MG Chew Take 81 mg by mouth Daily.    atorvastatin (LIPITOR) 40 MG tablet Take 40 mg by mouth nightly.    furosemide (LASIX) 40 MG tablet Take 40 mg by mouth Daily.    levothyroxine (SYNTHROID) 125 MCG tablet Take 125 mcg by mouth Daily.    metoprolol succinate (TOPROL-XL) 50 MG 24 hr tablet Take 50 mg by mouth Daily.    ranolazine (RANEXA) 500 MG Tb12 Take 500 mg by mouth 2 (two) times a day.    sacubitril/valsartan (ENTRESTO ORAL) Take 1 tablet by mouth 2 (two) times a day.    spironolactone (ALDACTONE) 25 MG tablet Take 12.5 mg by mouth Daily.    [DISCONTINUED] cloNIDine (CATAPRES) 0.1 MG tablet clonidine HCl 0.1 mg tablet    [DISCONTINUED] HYDROcodone-acetaminophen (NORCO) 5-325 mg per tablet Take 1 tablet by mouth every 6 (six) hours as needed for Pain.    [DISCONTINUED] imiquimod (ALDARA) 5 % cream Apply topically.    [DISCONTINUED] LISINOPRIL ORAL      Family History       Family history is unknown by patient.          Tobacco Use    Smoking status: Former Smoker     Types: Cigars    Smokeless tobacco: Never Used   Substance and Sexual Activity    Alcohol use: Not Currently    Drug use: Never    Sexual activity: Not Currently     Review of Systems   Constitutional:   Negative for fatigue and fever.   HENT:  Negative for congestion, ear pain, postnasal drip, sinus pain and sore throat.    Eyes:  Negative for pain, redness and visual disturbance.   Respiratory:  Negative for cough, shortness of breath and wheezing.    Cardiovascular:  Negative for chest pain, palpitations and leg swelling.   Gastrointestinal:  Negative for abdominal pain, diarrhea, nausea and vomiting.   Endocrine: Negative for cold intolerance and heat intolerance.   Musculoskeletal:  Positive for gait problem. Negative for arthralgias, joint swelling and myalgias.   Skin:  Negative for rash and wound.   Neurological:  Positive for weakness. Negative for dizziness, numbness and headaches.   Objective:     Vital Signs (Most Recent):  Temp: 98.4 °F (36.9 °C) (07/27/22 0700)  Pulse: 80 (07/27/22 0700)  Resp: 20 (07/27/22 0339)  BP: 117/61 (07/27/22 0700)  SpO2: 96 % (07/27/22 0700) Vital Signs (24h Range):  Temp:  [97.8 °F (36.6 °C)-98.4 °F (36.9 °C)] 98.4 °F (36.9 °C)  Pulse:  [60-93] 80  Resp:  [12-22] 20  SpO2:  [95 %-99 %] 96 %  BP: ()/(49-75) 117/61     Weight: 93.6 kg (206 lb 5.6 oz)  Body mass index is 26.49 kg/m².    Physical Exam  Constitutional:       General: He is not in acute distress.     Appearance: Normal appearance. He is normal weight.   HENT:      Right Ear: Decreased hearing noted.      Left Ear: Decreased hearing noted.      Mouth/Throat:      Mouth: Mucous membranes are moist.      Pharynx: Oropharynx is clear. No oropharyngeal exudate or posterior oropharyngeal erythema.   Eyes:      Extraocular Movements: Extraocular movements intact.      Conjunctiva/sclera: Conjunctivae normal.      Pupils: Pupils are equal, round, and reactive to light.   Neck:      Thyroid: No thyromegaly.   Cardiovascular:      Rate and Rhythm: Normal rate and regular rhythm.      Heart sounds: No murmur heard.    No friction rub. No gallop.   Pulmonary:      Breath sounds: Normal breath sounds.   Abdominal:       General: Bowel sounds are normal. There is no distension.      Palpations: Abdomen is soft.      Tenderness: There is no abdominal tenderness.   Lymphadenopathy:      Cervical: No cervical adenopathy.   Skin:     General: Skin is warm.      Findings: No rash.   Neurological:      General: No focal deficit present.      Mental Status: He is oriented to person, place, and time.      Cranial Nerves: No cranial nerve deficit.   Psychiatric:         Mood and Affect: Mood is not anxious or depressed. Affect is not inappropriate.         CRANIAL NERVES     CN III, IV, VI   Pupils are equal, round, and reactive to light.     Significant Labs: All pertinent labs within the past 24 hours have been reviewed.    Significant Imaging: I have reviewed all pertinent imaging results/findings within the past 24 hours.    Assessment/Plan:     * COVID-19 virus infection  -Conservative management, pt requiring no supplemental O2 at this time  -inflammatory markers in AM  -Vit C, D, zinc    Elevated liver enzymes  -downtrending  -suspect ischemic hepatitis 2/2 hypotension  -Right upper quadrant ultrasound showed hepatic steatosis, normal caliber common bile duct, negative Rivers's    Orthostatic hypotension  -resolved, has not recurred      CHF (congestive heart failure)  -Patient has a history ischemic cardiomyopathy with an ejection fraction of 30% and grade II diastolic dysfunction per record review in 2019, patient also with what I suspect to be an ICD in place  -worth interrogating to evaluate if this could be contributing to his recent history of falls  -maintain on telemetry    Recurrent falls  -CT head showing chronic microvascular changes  -attempting to find out patient's brand dual lead left chest cardiac device for interrogation purposes  -PT/OT      Admit to observation status under my care  VTE Risk Mitigation (From admission, onward)         Ordered     enoxaparin injection 40 mg  Daily         07/27/22 0033     IP VTE  HIGH RISK PATIENT  Once         07/27/22 0033                   Thairy G Reyes, DO  Department of Hospital Medicine   [facility]

## 2022-07-27 NOTE — ED PROVIDER NOTES
Encounter Date: 7/26/2022       History     Chief Complaint   Patient presents with    COVID-19 Concerns    Cough    Dizziness     Patient transferred from [facility] as they had no beds for admission; he was diagnosed with COVID infection 5 days ago and since then he has had a headache, nausea and dizziness and states he just has not felt well; also with a cough and clear sputum; he was to be admitted to [place] but they had no beds and fell because his blood pressure showed orthostatic hypotension he should be admitted        Review of patient's allergies indicates:  No Known Allergies  Past Medical History:   Diagnosis Date    Cancer     Cataract     CHF (congestive heart failure)     Coronary artery disease     Hypertension     Liver disease     Thyroid disease      Past Surgical History:   Procedure Laterality Date    ANKLE ARTHROSCOPY W/ OPEN REPAIR Left     CARDIAC SURGERY      EXCISION OF LESION Right 5/27/2022    Procedure: EXCISION, LESION;  Surgeon: Gwendolyn Joel MD;  Location: [facility];  Service: General;  Laterality: Right;    EYE SURGERY      left ear surgery Left     skin ca removed    SKIN BIOPSY       Family History   Family history unknown: Yes     Social History     Tobacco Use    Smoking status: Former Smoker     Types: Cigars    Smokeless tobacco: Never Used   Substance Use Topics    Alcohol use: Not Currently    Drug use: Never     Review of Systems   Constitutional: Negative.    Respiratory: Positive for cough.    Cardiovascular: Negative.    Gastrointestinal: Negative.    Genitourinary: Negative.    Musculoskeletal: Negative.    Neurological: Positive for headaches.       Physical Exam     Initial Vitals [07/27/22 0022]   BP Pulse Resp Temp SpO2   (!) 146/72 82 20 97.8 °F (36.6 °C) 97 %      MAP       --         Physical Exam    Nursing note and vitals reviewed.  Constitutional: He appears well-developed and well-nourished.   HENT:   Head: Normocephalic and  atraumatic.   Eyes: EOM are normal. Pupils are equal, round, and reactive to light.   Neck: Neck supple.   Normal range of motion.  Cardiovascular: Normal rate, regular rhythm and normal heart sounds.   Pulmonary/Chest: Breath sounds normal.   Abdominal: Abdomen is soft. Bowel sounds are normal.   Musculoskeletal:         General: Normal range of motion.      Cervical back: Normal range of motion and neck supple.     Neurological: He is alert and oriented to person, place, and time.   Skin: Skin is warm and dry.         ED Course   Procedures  Labs Reviewed - No data to display       Imaging Results    None          Medications   melatonin tablet 6 mg (has no administration in time range)   sodium chloride 0.9% flush 2 mL (has no administration in time range)   enoxaparin injection 40 mg (has no administration in time range)   acetaminophen tablet 650 mg (has no administration in time range)   senna-docusate 8.6-50 mg per tablet 1 tablet (has no administration in time range)   famotidine tablet 20 mg (has no administration in time range)   ondansetron disintegrating tablet 8 mg (has no administration in time range)          Latest Reference Range & Units 07/26/22 13:08   WBC 4.5 - 11.5 x10(3)/mcL 5.7   RBC 4.70 - 6.10 x10(6)/mcL 4.33 (L)   Hemoglobin 14.0 - 18.0 gm/dL 13.9 (L)   Hematocrit 42.0 - 52.0 % 39.7 (L)   MCV 80.0 - 94.0 fL 91.7   MCH 27.0 - 31.0 pg 32.1 (H)   MCHC 33.0 - 36.0 mg/dL 35.0   RDW 11.5 - 17.0 % 11.8   Platelets 130 - 400 x10(3)/mcL 254   MPV 7.4 - 10.4 fL 9.3   Neut % % 67.8   LYMPH % % 19.0   Mono % % 11.8   Eosinophil % % 0.7   Basophil % % 0.2   Immature Granulocytes % 0.5   Neut # 2.1 - 9.2 x10(3)/mcL 3.8   Lymph # 0.6 - 4.6 x10(3)/mcL 1.08   Mono # 0.1 - 1.3 x10(3)/mcL 0.67   Eos # 0 - 0.9 x10(3)/mcL 0.04   Baso # 0 - 0.2 x10(3)/mcL 0.01   Immature Grans (Abs) 0 - 0.04 x10(3)/mcL 0.03   nRBC % 0.0   (L): Data is abnormally low  (H): Data is abnormally high   Latest Reference Range & Units  07/26/22 15:19 07/26/22 16:12 07/26/22 18:39   Sodium 136 - 145 mmol/L  139    Potassium 3.5 - 5.1 mmol/L  3.9    Chloride 98 - 107 mmol/L  103    CO2 23 - 31 mmol/L  23    BUN 8.4 - 25.7 mg/dL  20.0    Creatinine 0.73 - 1.18 mg/dL  1.11    eGFR if non African American mls/min/1.73/m2  >60    Glucose 82 - 115 mg/dL  87    Calcium 8.8 - 10.0 mg/dL  7.6 (L)    Alkaline Phosphatase 40 - 150 unit/L  97    PROTEIN TOTAL 5.8 - 7.6 gm/dL  5.5 (L)    Albumin 3.4 - 4.8 gm/dL  2.9 (L)    Albumin/Globulin Ratio 1.1 - 2.0 ratio  1.1    BILIRUBIN TOTAL <=1.5 mg/dL  1.1    AST 5 - 34 unit/L  613 (H)    ALT 0 - 55 unit/L  915 (H)    Globulin, Total 2.4 - 3.5 gm/dL  2.6    Lactate, Blue 0.5 - 2.2 mmol/L 1.6     Acetaminophen (Tylenol), Serum 17.4 - 30.0 ug/ml  <17.4 (L)    Color, UA Yellow, Colorless, Other, Clear    Yellow   Appearance, UA Clear    SL CLOUDY !   Specific Gravity,UA    1.015   pH, UA 5.0, 5.5, 6.0, 6.5, 7.0, 7.5, 8.0, 8.5    5.5   Protein, UA Negative, 300  mg/dL   Negative   Glucose, UA Negative, Normal mg/dL   Negative   Ketones, UA Negative, +1, +2, +3, +4, +5, >=160, >=80 mg/dL   1+ !   Occult Blood UA Negative unit/L   Negative   NITRITE UA Negative    Negative   Bilirubin, UA Negative mg/dL   Negative   Urobilinogen, UA 0.2, 1.0, Normal mg/dL   0.2   Leukocytes, UA Negative, 75  unit/L   Negative   RBC, UA None Seen, 0-2, 3-5, 0-5 /HPF   0-2   WBC, UA None Seen, 0-2, 3-5, 0-5 /HPF   3-5   Bacteria, UA None Seen, Rare, Occasional /HPF   Rare   Squam Epithel, UA None Seen, Rare, Occasional, Occ /HPF   Few !   Hyaline Casts, UA None Seen /HPF   Rare !   Renal Epithelial Cells None Seen /HPF   Few !   Mucous, UA None Seen /LPF   Small !   CULTURE, URINE    Rpt   (L): Data is abnormally low  (H): Data is abnormally high  !: Data is abnormal  Rpt: View report in Results Review for more information      Reading Physician Reading Date Result Priority   Earle Sinclair MD  [phone] 7/26/2022 STAT     Narrative &  Impression  EXAMINATION:  CT HEAD WITHOUT CONTRAST     CLINICAL HISTORY:  ; Dizziness, persistent/recurrent, cardiac or vascular cause suspected;     TECHNIQUE:  Axial CT images were acquired without the intravenous administration of iodine-based contrast media.  Multiplanar reconstructions were accomplished by a CT technologist at a separate workstation and pushed to PACS for physician review.     Automated tube current modulation, weight-based exposure dosing, and/or iterative reconstruction technique utilized to reach lowest reasonably achievable exposure rate.  DLP: 1003 mGy*cm     COMPARISON:  None available at the time of the initial interpretation.     FINDINGS:  Images were reviewed in subdural, brain, soft tissue, and bone windows.     Exam quality: Motion/streak artifact limits assessment of the posterior fossa.     Hemorrhage:No evidence of acute hyperattenuating blood products.     Parenchyma: There is diffuse white matter hypoattenuation, typical of chronic microvascular changes. No discrete mass, mass effect, or CT evidence of acute large vascular territory insult. Gray-white differentiation is preserved.     Midline shift: None.     CSF spaces: Proportional appearance of ventricular and sulcal enlargement. No hydrocephalus. No masses or fluid collections.     Skull base: Mastoid air cells are well aerated. No focal abnormality. No suspicious findings of the sella.     Scalp/Skull: No abnormalities.     Vasculature: No focally hyperdense artery. Scattered carotid siphon calcifications are present. No abnormal densities within the dural sinuses.     Facial structures: Unremarkable.        Impression:        1. No acute intracranial abnormality.  2. 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  3. Additional chronic secondary details discussed above.        Electronically signed by: Earle Sinclair  Date:                                            07/26/2022  Time:                                            16:40  [phone] 7/26/2022 STAT     Narrative & Impression  EXAMINATION:  XR CHEST AP PORTABLE     CLINICAL HISTORY:  Chest Pain;;     COMPARISON:  02/27/2020     FINDINGS:  No focal consolidations, pleural effusions or pneumothoraces.     Dual lead left chest cardiac device with unchanged lead projection.     Post CABG changes.     Enlarged cardiac silhouette without overt decompensation.     No acute bony pathology.     Soft tissues within normal limits.     Impression:     No acute thoracic abnormality.     No significant interval change.        Electronically signed by: Michael El  Date:                                            07/26/2022  Time:                                           14:23                   Clinical Impression:   Final diagnoses:  [U07.1] COVID-19 virus infection (Primary)  [I95.1] Orthostatic hypotension  [R74.8] Elevated liver enzymes       Discussed admission to observation with Dr. Reyes   ED Disposition Condition    Observation               Eddie Garcia MD  07/27/22 0107

## 2022-07-27 NOTE — PLAN OF CARE
Spoke with patient's daughter, Jannet, concerning discharge plan. They would like patient to be placed into a nursing home as he is not safe to be home alone. I explained that we will have therapy work with him and if he needs to stay longer we can do physical rehab here and then assist in placing him in a nursing home facility. She verbalized understanding and stated she would like a nursing home near [facility] where patient lives.    room air

## 2022-07-27 NOTE — HPI
81-year-old male with a past medical history of prior MI status post PCI, hypertension, hyperlipidemia who was transferred from [facility] as they had no beds for admission; he was diagnosed with COVID infection 5 days ago and since then he has had a headache, nausea and dizziness and states he just has not felt well; also with a cough and clear sputum; he was to be admitted to [place] but they had no beds and fell because his blood pressure showed orthostatic hypotension he should be admitted.  Patient also with significantly elevated LFTs compared to baseline a few months ago.  Right upper quadrant ultrasound unremarkable, suspicion for ischemic hepatitis.  During eval here pt states he presented with complaint of passing out. Patient reports for the last 2 weeks his knees have been giving out under him and he falls down however last night for the 1st time he does not recall events leading up to his fall.  Patient does affirm to living alone.  CT head without contrast showed chronic microvascular changes.  Chest x-ray showed no acute thoracic abnormality.

## 2022-07-27 NOTE — PT/OT/SLP EVAL
Occupational Therapy   Acute Care Evaluation    Name: Adal Phoenix  MRN: [id number]  Admitting Diagnosis:  COVID-19 virus infection      History:   Adal Phoenix is a 81 y.o. male with a medical diagnosis of COVID-19 virus infection.    Past Medical History:   Diagnosis Date    Cancer     Cataract     CHF (congestive heart failure)     Coronary artery disease     Hypertension     Liver disease     Thyroid disease        Past Surgical History:   Procedure Laterality Date    ANKLE ARTHROSCOPY W/ OPEN REPAIR Left     CARDIAC SURGERY      EXCISION OF LESION Right 5/27/2022    Procedure: EXCISION, LESION;  Surgeon: Gwendolyn Joel MD;  Location: [facility];  Service: General;  Laterality: Right;    EYE SURGERY      left ear surgery Left     skin ca removed    SKIN BIOPSY         Subjective     Chief Complaint: Dizziness with transitions   Patient/Family Comments/goals: Safe return to PLOF     Occupational Profile:  Lives with: alone-- family lives in same neighborhood   Home Environment: 4-5 steps outside of home with L side railing and wall of home on R, walk-in shower with chair  Previous level of function: independent-- Pt required no use of AD at home. Pt reports he was still cutting the grass until ~3 weeks ago when he began having more frequent falls   Equipment Used at Home:  rollator, shower chair, wheelchair      Pain/Comfort:  · Pain Rating 1: 0/10    Blood Pressure:     Objective:     Communicated with: nursing prior to session.  Patient found supine with bed alarm, peripheral IV upon OT entry to room.    General Precautions: Standard, contact, airborne, droplet   Orthopedic Precautions:    Braces:    Respiratory Status: Room air    Occupational Performance:    Mobility  Assist level Comments    Bed mobility SBA  For supine to sit Pt required SBA, however with sit to supine Pt required increased assistance of ~Mod A    Transfer N/A Following movements did not occur due to concerns with Pt's BP being too  low with sitting upright    Functional mobility N/A    Sit to stand transitions N/A    Other:          Activities of Daily Living Assist level Comments    Feeding independent    Grooming/hygiene independent    Bathing N/A    Upper body dressing contact guard    Lower body dressing minimal assist    Toileting minimal assist    Toilet transfer minimal assist    Adaptive equipment training     Other:       Physical Exam:  Upper Extremity Strength:    -       Right Upper Extremity: 4/5  -       Left Upper Extremity: 4/5      Patient left HOB elevated with call button in reach, chair alarm on and daughter present    Assessment:     He presents with decreased trunk control during BLE testing performed by Pt. Pt demonstrated significant posterior lean when requested to kick out BLE. Pt also presented with a BP of 97/60 while seated EOB with concern for continued decreased with attempt to stand. Pt required increased assistance for sit to supine transition with presentation of fear once lying flat. Performance deficits affecting function: weakness, decreased safety awareness, impaired endurance.      Rehab Prognosis: Good; patient would benefit from acute skilled OT services to address these deficits and reach maximum level of function.       Plan:     Patient to be seen daily to address the above listed problems via self-care/home management, therapeutic activities, therapeutic exercises  · Plan of Care Reviewed with: patient, daughter      GOALS:   Multidisciplinary Problems     Occupational Therapy Goals        Problem: Occupational Therapy    Goal Priority Disciplines Outcome Interventions   Occupational Therapy Goal     OT, PT/OT Ongoing, Progressing    Description: Goals to be met by: 8/4/22     Patient will increase functional independence with ADLs by performing:    Toileting from bedside commode with Contact Guard Assistance for hygiene and clothing management.   Toilet transfer to bedside commode with Contact  Guard Assistance.                       Recommendations:     Discharge Recommendations: home with home health  Discharge Equipment Recommendations:  other (see comments) (possible use of rollator or RW)      Time Tracking:     OT Date of Treatment: 07/27/22  OT Start Time: 1350  OT Stop Time: 1410  OT Total Time (min): 20 min    Billable Minutes:Evaluation 20 7/27/2022

## 2022-07-27 NOTE — ASSESSMENT & PLAN NOTE
-Conservative management, pt requiring no supplemental O2 at this time  -inflammatory markers in AM  -Vit C, D, zinc

## 2022-07-27 NOTE — ASSESSMENT & PLAN NOTE
-Patient has a history ischemic cardiomyopathy with an ejection fraction of 30% and grade II diastolic dysfunction per record review in 2019, patient also with what I suspect to be an ICD in place  -worth interrogating to evaluate if this could be contributing to his recent history of falls  -maintain on telemetry

## 2022-07-27 NOTE — SUBJECTIVE & OBJECTIVE
Past Medical History:   Diagnosis Date    Cancer     Cataract     CHF (congestive heart failure)     Coronary artery disease     Hypertension     Liver disease     Thyroid disease        Past Surgical History:   Procedure Laterality Date    ANKLE ARTHROSCOPY W/ OPEN REPAIR Left     CARDIAC SURGERY      EXCISION OF LESION Right 5/27/2022    Procedure: EXCISION, LESION;  Surgeon: Gwendolyn Joel MD;  Location: [facility];  Service: General;  Laterality: Right;    EYE SURGERY      left ear surgery Left     skin ca removed    SKIN BIOPSY         Review of patient's allergies indicates:  No Known Allergies    Current Facility-Administered Medications on File Prior to Encounter   Medication    [COMPLETED] 0.9%  NaCl infusion    [COMPLETED] lactated ringers bolus 500 mL    [COMPLETED] sodium chloride 0.9% bolus 1,000 mL    [DISCONTINUED] lactated ringers infusion     Current Outpatient Medications on File Prior to Encounter   Medication Sig    amiodarone (PACERONE) 200 MG Tab Take 200 mg by mouth 2 (two) times a day.    aspirin 81 MG Chew Take 81 mg by mouth Daily.    atorvastatin (LIPITOR) 40 MG tablet Take 40 mg by mouth nightly.    furosemide (LASIX) 40 MG tablet Take 40 mg by mouth Daily.    levothyroxine (SYNTHROID) 125 MCG tablet Take 125 mcg by mouth Daily.    metoprolol succinate (TOPROL-XL) 50 MG 24 hr tablet Take 50 mg by mouth Daily.    ranolazine (RANEXA) 500 MG Tb12 Take 500 mg by mouth 2 (two) times a day.    sacubitril/valsartan (ENTRESTO ORAL) Take 1 tablet by mouth 2 (two) times a day.    spironolactone (ALDACTONE) 25 MG tablet Take 12.5 mg by mouth Daily.    [DISCONTINUED] cloNIDine (CATAPRES) 0.1 MG tablet clonidine HCl 0.1 mg tablet    [DISCONTINUED] HYDROcodone-acetaminophen (NORCO) 5-325 mg per tablet Take 1 tablet by mouth every 6 (six) hours as needed for Pain.    [DISCONTINUED] imiquimod (ALDARA) 5 % cream Apply topically.    [DISCONTINUED] LISINOPRIL ORAL      Family History       Family history is  unknown by patient.          Tobacco Use    Smoking status: Former Smoker     Types: Cigars    Smokeless tobacco: Never Used   Substance and Sexual Activity    Alcohol use: Not Currently    Drug use: Never    Sexual activity: Not Currently     Review of Systems   Constitutional:  Negative for fatigue and fever.   HENT:  Negative for congestion, ear pain, postnasal drip, sinus pain and sore throat.    Eyes:  Negative for pain, redness and visual disturbance.   Respiratory:  Negative for cough, shortness of breath and wheezing.    Cardiovascular:  Negative for chest pain, palpitations and leg swelling.   Gastrointestinal:  Negative for abdominal pain, diarrhea, nausea and vomiting.   Endocrine: Negative for cold intolerance and heat intolerance.   Musculoskeletal:  Positive for gait problem. Negative for arthralgias, joint swelling and myalgias.   Skin:  Negative for rash and wound.   Neurological:  Positive for weakness. Negative for dizziness, numbness and headaches.   Objective:     Vital Signs (Most Recent):  Temp: 98.4 °F (36.9 °C) (07/27/22 0700)  Pulse: 80 (07/27/22 0700)  Resp: 20 (07/27/22 0339)  BP: 117/61 (07/27/22 0700)  SpO2: 96 % (07/27/22 0700) Vital Signs (24h Range):  Temp:  [97.8 °F (36.6 °C)-98.4 °F (36.9 °C)] 98.4 °F (36.9 °C)  Pulse:  [60-93] 80  Resp:  [12-22] 20  SpO2:  [95 %-99 %] 96 %  BP: ()/(49-75) 117/61     Weight: 93.6 kg (206 lb 5.6 oz)  Body mass index is 26.49 kg/m².    Physical Exam  Constitutional:       General: He is not in acute distress.     Appearance: Normal appearance. He is normal weight.   HENT:      Right Ear: Decreased hearing noted.      Left Ear: Decreased hearing noted.      Mouth/Throat:      Mouth: Mucous membranes are moist.      Pharynx: Oropharynx is clear. No oropharyngeal exudate or posterior oropharyngeal erythema.   Eyes:      Extraocular Movements: Extraocular movements intact.      Conjunctiva/sclera: Conjunctivae normal.      Pupils: Pupils are equal,  round, and reactive to light.   Neck:      Thyroid: No thyromegaly.   Cardiovascular:      Rate and Rhythm: Normal rate and regular rhythm.      Heart sounds: No murmur heard.    No friction rub. No gallop.   Pulmonary:      Breath sounds: Normal breath sounds.   Abdominal:      General: Bowel sounds are normal. There is no distension.      Palpations: Abdomen is soft.      Tenderness: There is no abdominal tenderness.   Lymphadenopathy:      Cervical: No cervical adenopathy.   Skin:     General: Skin is warm.      Findings: No rash.   Neurological:      General: No focal deficit present.      Mental Status: He is oriented to person, place, and time.      Cranial Nerves: No cranial nerve deficit.   Psychiatric:         Mood and Affect: Mood is not anxious or depressed. Affect is not inappropriate.         CRANIAL NERVES     CN III, IV, VI   Pupils are equal, round, and reactive to light.     Significant Labs: All pertinent labs within the past 24 hours have been reviewed.    Significant Imaging: I have reviewed all pertinent imaging results/findings within the past 24 hours.

## 2022-07-27 NOTE — ED NOTES
Called to give report to floor. Per Sindhu, nurse will call back dt busy with critical pt on that unit.

## 2022-07-28 PROBLEM — R33.8 ACUTE URINARY RETENTION: Status: ACTIVE | Noted: 2022-07-28

## 2022-07-28 LAB
CK SERPL-CCNC: 650 U/L (ref 30–200)
CRP SERPL-MCNC: 7.7 MG/L
LDH SERPL-CCNC: 264 U/L (ref 125–220)

## 2022-07-28 PROCEDURE — 51701 INSERT BLADDER CATHETER: CPT

## 2022-07-28 PROCEDURE — 63600175 PHARM REV CODE 636 W HCPCS: Performed by: STUDENT IN AN ORGANIZED HEALTH CARE EDUCATION/TRAINING PROGRAM

## 2022-07-28 PROCEDURE — 36415 COLL VENOUS BLD VENIPUNCTURE: CPT | Performed by: STUDENT IN AN ORGANIZED HEALTH CARE EDUCATION/TRAINING PROGRAM

## 2022-07-28 PROCEDURE — 25000003 PHARM REV CODE 250: Performed by: STUDENT IN AN ORGANIZED HEALTH CARE EDUCATION/TRAINING PROGRAM

## 2022-07-28 PROCEDURE — 97116 GAIT TRAINING THERAPY: CPT

## 2022-07-28 PROCEDURE — 82550 ASSAY OF CK (CPK): CPT | Performed by: STUDENT IN AN ORGANIZED HEALTH CARE EDUCATION/TRAINING PROGRAM

## 2022-07-28 PROCEDURE — 97530 THERAPEUTIC ACTIVITIES: CPT

## 2022-07-28 PROCEDURE — 83615 LACTATE (LD) (LDH) ENZYME: CPT | Performed by: STUDENT IN AN ORGANIZED HEALTH CARE EDUCATION/TRAINING PROGRAM

## 2022-07-28 PROCEDURE — G0378 HOSPITAL OBSERVATION PER HR: HCPCS

## 2022-07-28 PROCEDURE — 96372 THER/PROPH/DIAG INJ SC/IM: CPT | Performed by: STUDENT IN AN ORGANIZED HEALTH CARE EDUCATION/TRAINING PROGRAM

## 2022-07-28 PROCEDURE — 97535 SELF CARE MNGMENT TRAINING: CPT

## 2022-07-28 PROCEDURE — 86140 C-REACTIVE PROTEIN: CPT | Performed by: STUDENT IN AN ORGANIZED HEALTH CARE EDUCATION/TRAINING PROGRAM

## 2022-07-28 RX ORDER — ALBUTEROL SULFATE 90 UG/1
4 AEROSOL, METERED RESPIRATORY (INHALATION) EVERY 6 HOURS PRN
Status: DISCONTINUED | OUTPATIENT
Start: 2022-07-28 | End: 2022-08-02 | Stop reason: HOSPADM

## 2022-07-28 RX ORDER — METOPROLOL SUCCINATE 25 MG/1
25 TABLET, EXTENDED RELEASE ORAL DAILY
Status: DISCONTINUED | OUTPATIENT
Start: 2022-07-28 | End: 2022-07-28

## 2022-07-28 RX ADMIN — Medication 1000 UNITS: at 09:07

## 2022-07-28 RX ADMIN — LEVOTHYROXINE SODIUM 125 MCG: 125 TABLET ORAL at 04:07

## 2022-07-28 RX ADMIN — SACUBITRIL AND VALSARTAN 1 TABLET: 24; 26 TABLET, FILM COATED ORAL at 09:07

## 2022-07-28 RX ADMIN — ASPIRIN 81 MG CHEWABLE TABLET 81 MG: 81 TABLET CHEWABLE at 04:07

## 2022-07-28 RX ADMIN — ENOXAPARIN SODIUM 40 MG: 100 INJECTION SUBCUTANEOUS at 04:07

## 2022-07-28 RX ADMIN — SPIRONOLACTONE 12.5 MG: 25 TABLET, FILM COATED ORAL at 04:07

## 2022-07-28 RX ADMIN — TAMSULOSIN HYDROCHLORIDE 0.4 MG: 0.4 CAPSULE ORAL at 09:07

## 2022-07-28 RX ADMIN — ZINC SULFATE 220 MG (50 MG) CAPSULE 220 MG: CAPSULE at 09:07

## 2022-07-28 RX ADMIN — AMIODARONE HYDROCHLORIDE 200 MG: 200 TABLET ORAL at 08:07

## 2022-07-28 RX ADMIN — AMIODARONE HYDROCHLORIDE 200 MG: 200 TABLET ORAL at 09:07

## 2022-07-28 RX ADMIN — FAMOTIDINE 20 MG: 20 TABLET ORAL at 09:07

## 2022-07-28 RX ADMIN — ATORVASTATIN CALCIUM 40 MG: 40 TABLET, FILM COATED ORAL at 08:07

## 2022-07-28 RX ADMIN — FUROSEMIDE 20 MG: 20 TABLET ORAL at 08:07

## 2022-07-28 RX ADMIN — OXYCODONE HYDROCHLORIDE AND ACETAMINOPHEN 500 MG: 500 TABLET ORAL at 09:07

## 2022-07-28 RX ADMIN — FAMOTIDINE 20 MG: 20 TABLET ORAL at 08:07

## 2022-07-28 RX ADMIN — SENNOSIDES, DOCUSATE SODIUM 1 TABLET: 8.6; 5 TABLET ORAL at 09:07

## 2022-07-28 RX ADMIN — RANOLAZINE 500 MG: 500 TABLET, EXTENDED RELEASE ORAL at 08:07

## 2022-07-28 RX ADMIN — RANOLAZINE 500 MG: 500 TABLET, EXTENDED RELEASE ORAL at 09:07

## 2022-07-28 RX ADMIN — SENNOSIDES, DOCUSATE SODIUM 1 TABLET: 8.6; 5 TABLET ORAL at 08:07

## 2022-07-28 NOTE — PROGRESS NOTES
Name: Adal Phoenix    : 1940 (81 y.o.)  MRN: [id number]           Patient Unavailable      Patient unable to be seen at this time secondary to: Pt returned to bed by PT per Pt request. Will continue POC tomorrow as able.

## 2022-07-28 NOTE — ASSESSMENT & PLAN NOTE
-Patient has a history ischemic cardiomyopathy with an ejection fraction of 30% and grade II diastolic dysfunction per record review in 2019  -a Medtronic Evera MRI XT DR Defibrillator implanted. It a complete MR Conditional system implanted, consisting of a SureScan device and Surescan leads. Implanted on dec 23, 2019. He sees Dr. Sathish Lockwood and Dr Mert Simmons at [facility]--> interrogated 714  -continue with Entresto, spironolactone, Lasix, ranolazine, discontinued metoprolol on 07/20 8th secondary to symptomatic orthostatic hypotension

## 2022-07-28 NOTE — NURSING
16 Fr 10cc hernandez catheter to gravity inserted at 1620, no discomfort noted, 500 ml dark yellow urine returned. Patient had a large watery stool early afternoon.

## 2022-07-28 NOTE — NURSING
Straight cath at 1000 after patient attempted voiding in urinal both lying down and sitting up on side of bed, 625 dark yellow urine obtained, no discomfort noted. Patient stated he did not feel any bladder discomfort or the urge to void.

## 2022-07-28 NOTE — NURSING
At 0000 patient had still not voided since removal of hernandez cath around 1600; bladder scanned pt and noted 720mL; called Dr. Reyes to see if she wanted me to perform an in and out and was given the verbal order to do in and out if pt had not voided by 0300; pt tried to void again at 0300 with no success ; in and out performed at 0330 and drained 1025mL from bladder; pt tolerated well and resting with no discomfort.

## 2022-07-28 NOTE — PT/OT/SLP PROGRESS
Physical Therapy Treatment Note           Name: Adal Phoenix    : 1940 (81 y.o.)  MRN: [id number]           TREATMENT SUMMARY AND RECOMMENDATIONS:    PT Received On: 22  PT Start Time: 1030     PT Stop Time: 1053  PT Total Time (min): 23 min     Subjective Assessment:   No complaints  Lethargic   x Awake, alert, cooperative  Uncooperative    Agitated  c/o pain    Appropriate  c/o fatigue    Confused x Treated at bedside     Emotionally labile  Treated in gym/dept.    Impulsive  Other:    Flat affect       Therapy Precautions:    Cognitive deficits  Spinal precautions    Collar - hard  Sternal precautions    Collar - soft   TLSO   x Fall risk  LSO    Hip precautions - posterior  Knee immobilizer    Hip precautions - anterior  WBAT    Impaired communication  Partial weightbearing    Oxygen  TTWB    PEG tube  NWB    Visual deficits  Other:   x Hearing deficits          Treatment Objectives:     Mobility Training:   Assist level Comments    Bed mobility Mod I Supine > sit   Transfer     Gait SBA x10ft bed > bedside chair with no AD   Sit to stand transitions SBA Sit to stand from EOB   Sitting balance     Standing balance      Wheelchair mobility     Car transfer     Other:          Therapeutic Exercise:   Exercise Sets Reps Comments                               Additional Comments:  Orthostatic hypotension vitals as followed:   Supine = 118/64  Sitting EOB = 104/64  Standing EOB = 92/58    Patient asymptomatic and did not demonstrate any s/s    Assessment: Patient tolerated session well. Functionally, patient moves well and demonstrates WFL muscle strength. Patient has significant fall history over the past few weeks and has been having orthostatic BP issues. Discussed concerns about pt returning home alone with minimal assist from family, as he has been having falls and BP issues have no been resolved. It patient were to return home prematurely, this places him at high risk for falls. Nursing will  relay concerns to MD. Patient would benefit from continued stay to ensure safety upon dc.     PT Plan: continue POC  Revisions made to plan of care: No    GOALS:   Multidisciplinary Problems     Physical Therapy Goals        Problem: Physical Therapy    Goal Priority Disciplines Outcome Goal Variances Interventions   Physical Therapy Goal     PT, PT/OT Ongoing, Progressing     Description: Goals to be met by: Discharge     Patient will increase functional independence with mobility by performin. Supine to sit with Modified [place]  2. Sit to supine with Modified [place]  3. Sit to stand transfer with Modified [place]  4. Bed to chair transfer with Modified [place] using Rolling Walker vs LRAD  5. Gait  x 200 feet with Modified [place] using Rolling Walker vs LRAD.   6. Ascend/descend 5 stair with left Handrails Stand-by Assistance using No Assistive Device.                      Skilled PT Minutes Provided: 23 minutes   Communication with Treatment Team: co-tx with OT and discussion with nurse Radha Wetzel recommendations:       At end of treatment, patient remained:  x Up in chair     Up in wheelchair in room    In bed   x With alarm activated    Bed rails up   x Call bell in reach     Family/friends present    Restraints secured properly    In bathroom with CNA/RN notified   x Nurse aware    In gym with therapist/tech    Other:

## 2022-07-28 NOTE — PROGRESS NOTES
[facility]  [facility] Medicine  Progress Note    Patient Name: Adal Phoenix  MRN: [id number]  Patient Class: OP- Observation   Admission Date: 7/27/2022  Length of Stay: 0 days  Attending Physician: Thairy G Reyes, DO  Primary Care Provider: Fernandez Castillo MD        Subjective:     Principal Problem:COVID-19 virus infection        HPI:  81-year-old male with a past medical history of prior MI status post PCI, hypertension, hyperlipidemia who was transferred from [facility] as they had no beds for admission; he was diagnosed with COVID infection 5 days ago and since then he has had a headache, nausea and dizziness and states he just has not felt well; also with a cough and clear sputum; he was to be admitted to [place] but they had no beds and fell because his blood pressure showed orthostatic hypotension he should be admitted.  Patient also with significantly elevated LFTs compared to baseline a few months ago.  Right upper quadrant ultrasound unremarkable, suspicion for ischemic hepatitis.  During eval here pt states he presented with complaint of passing out. Patient reports for the last 2 weeks his knees have been giving out under him and he falls down however last night for the 1st time he does not recall events leading up to his fall.  Patient does affirm to living alone.  CT head without contrast showed chronic microvascular changes.  Chest x-ray showed no acute thoracic abnormality.      Overview/Hospital Course:  Patient admitted for syncopal episode and incidentally found to have elevated LFTs likely secondary to ischemic hepatitis from volume depletion secondary to COVID-19.  However while admitted at our facility patient has been noted to have significant orthostatic hypotension.  This is likely the source of patient's syncopal episodes outpatient.  Discontinued metoprolol, will monitor response.  As far as COVID-19 patient is asymptomatic and not requiring any  supplemental oxygen.  When patient arrived as a transfer he had Bee catheter in place for acute retention.  Bee catheter was removed and he required intermittent catheterization twice overnight.  Bee catheter will be replaced and patient will be discharged with a Bee catheter.      Interval History:  No acute complaints.    Review of Systems   Constitutional:  Negative for fatigue and fever.   HENT:  Negative for congestion, ear pain, postnasal drip, sinus pain and sore throat.    Eyes:  Negative for pain, redness and visual disturbance.   Respiratory:  Negative for cough, shortness of breath and wheezing.    Cardiovascular:  Negative for chest pain, palpitations and leg swelling.   Gastrointestinal:  Negative for abdominal pain, diarrhea, nausea and vomiting.   Endocrine: Negative for cold intolerance and heat intolerance.   Musculoskeletal:  Negative for arthralgias, joint swelling and myalgias.   Skin:  Negative for rash and wound.   Neurological:  Negative for dizziness, weakness, numbness and headaches.   Objective:     Vital Signs (Most Recent):  Temp: 97.9 °F (36.6 °C) (07/28/22 0407)  Pulse: 62 (07/28/22 0407)  Resp: 20 (07/27/22 2233)  BP: (!) 92/58 (Nurse notified) (07/28/22 1103)  SpO2: 95 % (07/28/22 0407)   Vital Signs (24h Range):  Temp:  [97.8 °F (36.6 °C)-98.8 °F (37.1 °C)] 97.9 °F (36.6 °C)  Pulse:  [62-89] 62  Resp:  [20] 20  SpO2:  [95 %-97 %] 95 %  BP: ()/(39-68) 92/58     Weight: 93.6 kg (206 lb 5.6 oz)  Body mass index is 26.49 kg/m².    Intake/Output Summary (Last 24 hours) at 7/28/2022 1408  Last data filed at 7/28/2022 1000  Gross per 24 hour   Intake --   Output 975 ml   Net -975 ml      Physical Exam  Constitutional:       General: He is not in acute distress.     Appearance: Normal appearance. He is obese.   HENT:      Head: Normocephalic and atraumatic.      Nose: No congestion or rhinorrhea.      Mouth/Throat:      Mouth: Mucous membranes are moist.   Eyes:       Conjunctiva/sclera: Conjunctivae normal.      Pupils: Pupils are equal, round, and reactive to light.   Cardiovascular:      Rate and Rhythm: Normal rate and regular rhythm.      Heart sounds: No murmur heard.  Pulmonary:      Effort: Pulmonary effort is normal.      Breath sounds: Normal breath sounds. No wheezing.   Abdominal:      General: Bowel sounds are normal. There is no distension.      Palpations: Abdomen is soft.      Tenderness: There is no abdominal tenderness.   Musculoskeletal:         General: No swelling. Normal range of motion.      Cervical back: Normal range of motion and neck supple.      Right lower leg: No edema.      Left lower leg: No edema.   Skin:     General: Skin is warm and dry.      Findings: No rash.   Neurological:      General: No focal deficit present.      Mental Status: He is alert and oriented to person, place, and time.   Psychiatric:         Mood and Affect: Mood normal.         Behavior: Behavior normal.       Significant Labs: All pertinent labs within the past 24 hours have been reviewed.    Significant Imaging: I have reviewed all pertinent imaging results/findings within the past 24 hours.      Assessment/Plan:      * COVID-19 virus infection  -Conservative management, pt requiring no supplemental O2 at this time  -inflammatory markers mildly elevated  -Vit C, D, zinc    Elevated liver enzymes  -downtrending  -suspect ischemic hepatitis 2/2 hypotension  -Right upper quadrant ultrasound showed hepatic steatosis, normal caliber common bile duct, negative Rivers's    Orthostatic hypotension  -discontinued metoprolol on 7/28      CHF (congestive heart failure)  -Patient has a history ischemic cardiomyopathy with an ejection fraction of 30% and grade II diastolic dysfunction per record review in 2019  -a Medtronic Evera MRI XT DR Defibrillator implanted. It a complete MR Conditional system implanted, consisting of a SureScan device and Surescan leads. Implanted on dec 23,  2019. He sees Dr. Sathish Lockwood and Dr Mert Simmons at [facility]--> interrogated 714  -continue with Entresto, spironolactone, Lasix, ranolazine, discontinued metoprolol on 07/20 8th secondary to symptomatic orthostatic hypotension        Recurrent falls  -suspect secondary to orthostatic hypotension, metoprolol discontinued on 07/28; monitor response  - CT head showing chronic microvascular change  -PT/OT      Acute urinary retention  -discharge with Bee catheter        VTE Risk Mitigation (From admission, onward)         Ordered     Place LIVE hose  Until discontinued         07/28/22 1059     enoxaparin injection 40 mg  Daily         07/27/22 0033     IP VTE HIGH RISK PATIENT  Once         07/27/22 0033                Discharge Planning   AUSTYN:      Code Status: Full Code   Is the patient medically ready for discharge?:     Reason for patient still in hospital (select all that apply): Patient trending condition, Treatment and PT / OT recommendations  Discharge Plan A: Home with family, Home Health                  Thairy G Reyes, DO  Department of Hospital Medicine   [facility]

## 2022-07-28 NOTE — PLAN OF CARE
Daughter requesting nursing home placement for patient. She requested nursing homes near [place]. Referrals sent to [facility], [facility], and [facility] via [facility]. [place] of choice signed.

## 2022-07-28 NOTE — PLAN OF CARE
Bee catheter d/c'd with no discomfort, urinal at bedside, will continue to monitor for s/s of urinary retention.  Problem: Adult Inpatient Plan of Care  Goal: Plan of Care Review  Outcome: Ongoing, Progressing  Goal: Patient-Specific Goal (Individualized)  Outcome: Ongoing, Progressing  Goal: Absence of Hospital-Acquired Illness or Injury  Outcome: Ongoing, Progressing  Goal: Optimal Comfort and Wellbeing  Outcome: Ongoing, Progressing  Goal: Readiness for Transition of Care  Outcome: Ongoing, Progressing     Problem: Infection  Goal: Absence of Infection Signs and Symptoms  Outcome: Ongoing, Progressing     Problem: Adult Inpatient Plan of Care  Goal: Plan of Care Review  Outcome: Ongoing, Progressing  Goal: Patient-Specific Goal (Individualized)  Outcome: Ongoing, Progressing  Goal: Absence of Hospital-Acquired Illness or Injury  Outcome: Ongoing, Progressing  Intervention: Identify and Manage Fall Risk  Flowsheets (Taken 7/27/2022 2022)  Safety Promotion/Fall Prevention:   assistive device/personal item within reach   bed alarm set   nonskid shoes/socks when out of bed  Intervention: Prevent Skin Injury  Flowsheets (Taken 7/27/2022 2022)  Body Position: sitting up in bed  Skin Protection: incontinence pads utilized  Intervention: Prevent and Manage VTE (Venous Thromboembolism) Risk  Flowsheets (Taken 7/27/2022 2022)  Activity Management: Sitting at edge of bed - L2  VTE Prevention/Management: bleeding precations maintained  Range of Motion: active ROM (range of motion) encouraged  Goal: Optimal Comfort and Wellbeing  Outcome: Ongoing, Progressing  Goal: Readiness for Transition of Care  Outcome: Ongoing, Progressing     Problem: Infection  Goal: Absence of Infection Signs and Symptoms  Outcome: Ongoing, Progressing

## 2022-07-28 NOTE — ASSESSMENT & PLAN NOTE
-suspect secondary to orthostatic hypotension, metoprolol discontinued on 07/28; monitor response  - CT head showing chronic microvascular change  -PT/OT

## 2022-07-28 NOTE — ASSESSMENT & PLAN NOTE
-Conservative management, pt requiring no supplemental O2 at this time  -inflammatory markers mildly elevated  -Vit C, D, zinc

## 2022-07-28 NOTE — PLAN OF CARE
Problem: Adult Inpatient Plan of Care  Goal: Plan of Care Review  Outcome: Ongoing, Progressing  Goal: Patient-Specific Goal (Individualized)  Outcome: Ongoing, Progressing  Goal: Absence of Hospital-Acquired Illness or Injury  Outcome: Ongoing, Progressing  Intervention: Identify and Manage Fall Risk  Flowsheets (Taken 7/28/2022 1544)  Safety Promotion/Fall Prevention:   assistive device/personal item within reach   bed alarm set   nonskid shoes/socks when out of bed   instructed to call staff for mobility  Intervention: Prevent Skin Injury  Flowsheets (Taken 7/28/2022 1544)  Body Position: weight shifting  Skin Protection: incontinence pads utilized  Goal: Optimal Comfort and Wellbeing  Outcome: Ongoing, Progressing  Goal: Readiness for Transition of Care  Outcome: Ongoing, Progressing     Problem: Infection  Goal: Absence of Infection Signs and Symptoms  Outcome: Ongoing, Progressing

## 2022-07-28 NOTE — PROGRESS NOTES
"[facility]  Adult Nutrition  Progress Note    SUMMARY       Recommendations    Recommendation/Intervention: 1. continue diabetic diet.    Assessment and Plan    No new Assessment & Plan notes have been filed under this hospital service since the last note was generated.  Service: Nutrition       Malnutrition Assessment                                       Reason for Assessment    Reason For Assessment: length of stay  Diagnosis: other (see comments) (COVID-19 virus infection 7/27/2022    Orthostatic hypotension 7/27/2022    Elevated liver enzymes 7/27/2022    CHF (congestive heart failure) Unknown    Recurrent falls 7/27/2022    Acute urinary retention)  General Information Comments: Pt admitted with COVID-19, limiting amount of people in room. Unable to complete nutrition focused physical exam at this time. Reviewed intake 100%.    Nutrition Risk Screen    Nutrition Risk Screen: no indicators present    Nutrition/Diet History         Anthropometrics    Temp: 97.9 °F (36.6 °C)  Height: 6' 2" (188 cm)  Height (inches): 74 in  Weight Method: Bed Scale  Weight: 93.6 kg (206 lb 5.6 oz)  Weight (lb): 206.35 lb  Ideal Body Weight (IBW), Male: 190 lb  % Ideal Body Weight, Male (lb): 108.61 %  BMI (Calculated): 26.5  BMI Grade: 25 - 29.9 - overweight       Lab/Procedures/Meds    Pertinent Labs Reviewed: reviewed  Pertinent Medications Reviewed: reviewed    Physical Findings/Assessment         Estimated/Assessed Needs    Weight Used For Calorie Calculations: 93.6 kg (206 lb 5.6 oz)  Energy Calorie Requirements (kcal): 2340 (25 kcal/kg/CBW)  Energy Need Method: [place]  Protein Requirements: 93.8 (1.0 gm/kg/CBW)  Weight Used For Protein Calculations: 93.6 kg (206 lb 5.6 oz)     Estimated Fluid Requirement Method: RDA Method  RDA Method (mL): 2340         Nutrition Prescription Ordered    Current Diet Order: diabetic    Evaluation of Received Nutrient/Fluid Intake         Nutrition " Risk    Level of Risk/Frequency of Follow-up: low - moderate     Monitor and Evaluation    Food and Nutrient Intake: food and beverage intake  Food and Nutrient Adminstration: diet order     Nutrition Follow-Up    RD Follow-up?: Yes

## 2022-07-28 NOTE — PT/OT/SLP PROGRESS
Occupational Therapy  Treatment    Name: Adal Phoenix    : 1940 (81 y.o.)  MRN: [id number]           TREATMENT SUMMARY AND RECOMMENDATIONS:      OT Date of Treatment: 22  OT Start Time: 1030  OT Stop Time: 1053  OT Total Time (min): 23 min      Subjective Assessment:   No complaints  Lethargic   X Awake, alert, cooperative  Impulsive    Uncooperative   Flat affect    Agitated  c/o pain    Appropriate  c/o fatigue    Confused X Treated at bedside     Emotionally labile  Treated in gym/dept.      Other:        Therapy Precautions:    Cognitive deficits  Spinal precautions    Collar - hard  Sternal precautions    Collar - soft   TLSO   X Fall risk  LSO    Hip precautions - posterior  Knee immobilizer    Hip precautions - anterior  WBAT    Impaired communication  Partial weightbearing    Oxygen  TTWB    PEG tube  NWB    Visual deficits     X Hearing deficits   Other:        Treatment Objectives:     Mobility Training:    Mobility task Assist level Comments    Bed mobility Mod I     Transfer SBA  Pt completed few steps from EOB to bedside chair    Sit to stands transitions SBA     Functional mobility     Sitting balance     Standing balance      Other:        ADL Training:    ADL Assist level Comments    Feeding     Grooming/hygiene Mod I  While seated in bedside chair, Pt completed oral hygiene with no increased assistance needed by OT    Bathing     Upper body dressing     Lower body dressing SBA  Pt completed brief change, threading BLE while seated in bedside chair and standing to pull up with no LOB noted.    Toileting     Toilet transfer     Adaptive equipment training     Other:           Therapeutic Exercise:   Exercise Sets Reps Comments                               Additional Comments:  Orthostatic hypotension vitals as followed:   Supine = 118/64  Sitting EOB = 104/64  Standing EOB = 92/58     Patient asymptomatic and did not demonstrate any s/s    Assessment: Patient tolerated session well. From an  OT standpoint, Pt requires SBA for all ADL. Pt recently experienced multiple falls at home with possible correlation to BP issues. At this time, if Pt were to discharge prematurely with no resolution for BP, he would be at high risk for continued falling and possible further injury. Concerns discussed with Nurse Avilez, whom reported she will relay concerns to MD prior to discharge decision.     OT Plan: continue with current POC   Revisions made to plan of care: No    GOALS:   Multidisciplinary Problems     Occupational Therapy Goals        Problem: Occupational Therapy    Goal Priority Disciplines Outcome Interventions   Occupational Therapy Goal     OT, PT/OT Ongoing, Progressing    Description: Goals to be met by: 8/4/22     Patient will increase functional independence with ADLs by performing:    Toileting from bedside commode with Contact Guard Assistance for hygiene and clothing management.   Toilet transfer to bedside commode with Contact Guard Assistance.                     Skilled OT Minutes Provided: 23  Communication with Treatment Team: co-tx with PT, concerns discussed with Nurse Burgos     Equipment recommendations:       At end of treatment, patient remained:  X Up in chair     Up in wheelchair in room    In bed   X With alarm activated    Bed rails up   X Call bell in reach     Family/friends present    Restraints secured properly    In bathroom with CNA/RN notified    In gym with PT/PTA/tech   X Nurse aware    Other:

## 2022-07-28 NOTE — SUBJECTIVE & OBJECTIVE
Interval History:  No acute complaints.    Review of Systems   Constitutional:  Negative for fatigue and fever.   HENT:  Negative for congestion, ear pain, postnasal drip, sinus pain and sore throat.    Eyes:  Negative for pain, redness and visual disturbance.   Respiratory:  Negative for cough, shortness of breath and wheezing.    Cardiovascular:  Negative for chest pain, palpitations and leg swelling.   Gastrointestinal:  Negative for abdominal pain, diarrhea, nausea and vomiting.   Endocrine: Negative for cold intolerance and heat intolerance.   Musculoskeletal:  Negative for arthralgias, joint swelling and myalgias.   Skin:  Negative for rash and wound.   Neurological:  Negative for dizziness, weakness, numbness and headaches.   Objective:     Vital Signs (Most Recent):  Temp: 97.9 °F (36.6 °C) (07/28/22 0407)  Pulse: 62 (07/28/22 0407)  Resp: 20 (07/27/22 2233)  BP: (!) 92/58 (Nurse notified) (07/28/22 1103)  SpO2: 95 % (07/28/22 0407)   Vital Signs (24h Range):  Temp:  [97.8 °F (36.6 °C)-98.8 °F (37.1 °C)] 97.9 °F (36.6 °C)  Pulse:  [62-89] 62  Resp:  [20] 20  SpO2:  [95 %-97 %] 95 %  BP: ()/(39-68) 92/58     Weight: 93.6 kg (206 lb 5.6 oz)  Body mass index is 26.49 kg/m².    Intake/Output Summary (Last 24 hours) at 7/28/2022 1408  Last data filed at 7/28/2022 1000  Gross per 24 hour   Intake --   Output 975 ml   Net -975 ml      Physical Exam  Constitutional:       General: He is not in acute distress.     Appearance: Normal appearance. He is obese.   HENT:      Head: Normocephalic and atraumatic.      Nose: No congestion or rhinorrhea.      Mouth/Throat:      Mouth: Mucous membranes are moist.   Eyes:      Conjunctiva/sclera: Conjunctivae normal.      Pupils: Pupils are equal, round, and reactive to light.   Cardiovascular:      Rate and Rhythm: Normal rate and regular rhythm.      Heart sounds: No murmur heard.  Pulmonary:      Effort: Pulmonary effort is normal.      Breath sounds: Normal breath  sounds. No wheezing.   Abdominal:      General: Bowel sounds are normal. There is no distension.      Palpations: Abdomen is soft.      Tenderness: There is no abdominal tenderness.   Musculoskeletal:         General: No swelling. Normal range of motion.      Cervical back: Normal range of motion and neck supple.      Right lower leg: No edema.      Left lower leg: No edema.   Skin:     General: Skin is warm and dry.      Findings: No rash.   Neurological:      General: No focal deficit present.      Mental Status: He is alert and oriented to person, place, and time.   Psychiatric:         Mood and Affect: Mood normal.         Behavior: Behavior normal.       Significant Labs: All pertinent labs within the past 24 hours have been reviewed.    Significant Imaging: I have reviewed all pertinent imaging results/findings within the past 24 hours.

## 2022-07-28 NOTE — PT/OT/SLP PROGRESS
Physical Therapy Treatment Note           Name: Adal Phoenix    : 1940 (81 y.o.)  MRN: [id number]           TREATMENT SUMMARY AND RECOMMENDATIONS:    PT Received On: 22  PT Start Time: 1339     PT Stop Time: 1349  PT Total Time (min): 10 min     Subjective Assessment:   No complaints  Lethargic   x Awake, alert, cooperative  Uncooperative    Agitated  c/o pain    Appropriate  c/o fatigue    Confused x Treated at bedside     Emotionally labile  Treated in gym/dept.    Impulsive  Other:    Flat affect       Therapy Precautions:    Cognitive deficits  Spinal precautions    Collar - hard  Sternal precautions    Collar - soft   TLSO    Fall risk  LSO    Hip precautions - posterior  Knee immobilizer    Hip precautions - anterior  WBAT    Impaired communication  Partial weightbearing    Oxygen  TTWB    PEG tube  NWB    Visual deficits  Other:    Hearing deficits          Treatment Objectives:     Mobility Training:   Assist level Comments    Bed mobility Mod I Sit > supine    Transfer     Gait Mod I Ambulated chair > bed without AD; no LOB; no dizziness   Sit to stand transitions     Sitting balance     Standing balance      Wheelchair mobility     Car transfer     Other:          Therapeutic Exercise:   Exercise Sets Reps Comments                               Additional Comments:      Assessment: Patient tolerated session well. Was requesting to return to bed.     PT Plan: Continue POC  Revisions made to plan of care: No    GOALS:   Multidisciplinary Problems     Physical Therapy Goals        Problem: Physical Therapy    Goal Priority Disciplines Outcome Goal Variances Interventions   Physical Therapy Goal     PT, PT/OT Ongoing, Progressing     Description: Goals to be met by: Discharge     Patient will increase functional independence with mobility by performin. Supine to sit with Modified [place]  2. Sit to supine with Modified [place]  3. Sit to stand transfer with Modified  [place]  4. Bed to chair transfer with Modified [place] using Rolling Walker vs LRAD  5. Gait  x 200 feet with Modified [place] using Rolling Walker vs LRAD.   6. Ascend/descend 5 stair with left Handrails Stand-by Assistance using No Assistive Device.                      Skilled PT Minutes Provided: 10 minutes   Communication with Treatment Team:     Equipment recommendations:       At end of treatment, patient remained:   Up in chair     Up in wheelchair in room   x In bed   x With alarm activated   x Bed rails up   x Call bell in reach     Family/friends present    Restraints secured properly    In bathroom with CNA/RN notified    Nurse aware    In gym with therapist/tech    Other:

## 2022-07-28 NOTE — HOSPITAL COURSE
Patient admitted for syncopal episode and incidentally found to have elevated LFTs likely secondary to ischemic hepatitis from volume depletion secondary to COVID-19.  However while admitted at our facility patient has been noted to have significant orthostatic hypotension.  This is likely the source of patient's syncopal episodes outpatient.  Discontinued metoprolol, will monitor response.  As far as COVID-19 patient is asymptomatic and not requiring any supplemental oxygen.  When patient arrived as a transfer he had Bee catheter in place for acute retention.  Bee catheter was removed and he required intermittent catheterization twice overnight.  Bee catheter will be replaced and patient will be discharged with a Bee catheter.  Patient with left eye blepharitis, erythromycin ointment and warm compress.  DC to NH home today. Pt ambulating without difficulty with a walker.

## 2022-07-29 LAB — BACTERIA UR CULT: NO GROWTH

## 2022-07-29 PROCEDURE — 63600175 PHARM REV CODE 636 W HCPCS: Performed by: STUDENT IN AN ORGANIZED HEALTH CARE EDUCATION/TRAINING PROGRAM

## 2022-07-29 PROCEDURE — 97530 THERAPEUTIC ACTIVITIES: CPT

## 2022-07-29 PROCEDURE — 97116 GAIT TRAINING THERAPY: CPT | Mod: CQ

## 2022-07-29 PROCEDURE — 97110 THERAPEUTIC EXERCISES: CPT

## 2022-07-29 PROCEDURE — 11000001 HC ACUTE MED/SURG PRIVATE ROOM

## 2022-07-29 PROCEDURE — 27000207 HC ISOLATION

## 2022-07-29 PROCEDURE — 25000003 PHARM REV CODE 250: Performed by: STUDENT IN AN ORGANIZED HEALTH CARE EDUCATION/TRAINING PROGRAM

## 2022-07-29 RX ADMIN — Medication 1000 UNITS: at 09:07

## 2022-07-29 RX ADMIN — LEVOTHYROXINE SODIUM 125 MCG: 125 TABLET ORAL at 04:07

## 2022-07-29 RX ADMIN — ATORVASTATIN CALCIUM 40 MG: 40 TABLET, FILM COATED ORAL at 08:07

## 2022-07-29 RX ADMIN — SPIRONOLACTONE 12.5 MG: 25 TABLET, FILM COATED ORAL at 04:07

## 2022-07-29 RX ADMIN — FAMOTIDINE 20 MG: 20 TABLET ORAL at 09:07

## 2022-07-29 RX ADMIN — RANOLAZINE 500 MG: 500 TABLET, EXTENDED RELEASE ORAL at 08:07

## 2022-07-29 RX ADMIN — ASPIRIN 81 MG CHEWABLE TABLET 81 MG: 81 TABLET CHEWABLE at 04:07

## 2022-07-29 RX ADMIN — AMIODARONE HYDROCHLORIDE 200 MG: 200 TABLET ORAL at 09:07

## 2022-07-29 RX ADMIN — ENOXAPARIN SODIUM 40 MG: 100 INJECTION SUBCUTANEOUS at 04:07

## 2022-07-29 RX ADMIN — SACUBITRIL AND VALSARTAN 1 TABLET: 24; 26 TABLET, FILM COATED ORAL at 09:07

## 2022-07-29 RX ADMIN — SENNOSIDES, DOCUSATE SODIUM 1 TABLET: 8.6; 5 TABLET ORAL at 09:07

## 2022-07-29 RX ADMIN — OXYCODONE HYDROCHLORIDE AND ACETAMINOPHEN 500 MG: 500 TABLET ORAL at 09:07

## 2022-07-29 RX ADMIN — FAMOTIDINE 20 MG: 20 TABLET ORAL at 08:07

## 2022-07-29 RX ADMIN — RANOLAZINE 500 MG: 500 TABLET, EXTENDED RELEASE ORAL at 09:07

## 2022-07-29 RX ADMIN — TAMSULOSIN HYDROCHLORIDE 0.4 MG: 0.4 CAPSULE ORAL at 09:07

## 2022-07-29 RX ADMIN — ZINC SULFATE 220 MG (50 MG) CAPSULE 220 MG: CAPSULE at 09:07

## 2022-07-29 RX ADMIN — FUROSEMIDE 20 MG: 20 TABLET ORAL at 09:07

## 2022-07-29 RX ADMIN — SENNOSIDES, DOCUSATE SODIUM 1 TABLET: 8.6; 5 TABLET ORAL at 08:07

## 2022-07-29 NOTE — PLAN OF CARE
Problem: Adult Inpatient Plan of Care  Goal: Plan of Care Review  Outcome: Ongoing, Progressing  Flowsheets (Taken 7/29/2022 0041)  Plan of Care Reviewed With: patient  Goal: Absence of Hospital-Acquired Illness or Injury  Outcome: Ongoing, Progressing  Intervention: Identify and Manage Fall Risk  Flowsheets (Taken 7/29/2022 0041)  Safety Promotion/Fall Prevention:   assistive device/personal item within reach   bed alarm set   Fall Risk reviewed with patient/family   muscle strengthening facilitated   nonskid shoes/socks when out of bed   side rails raised x 3   supervised activity   Supervised toileting - stay within arms reach   instructed to call staff for mobility  Intervention: Prevent Skin Injury  Flowsheets (Taken 7/29/2022 0041)  Body Position: position changed independently  Skin Protection:   incontinence pads utilized   skin sealant/moisture barrier applied  Intervention: Prevent and Manage VTE (Venous Thromboembolism) Risk  Flowsheets (Taken 7/29/2022 0041)  Activity Management: Ambulated in room - L4  Intervention: Prevent Infection  Flowsheets (Taken 7/29/2022 0041)  Infection Prevention:   hand hygiene promoted   personal protective equipment utilized   rest/sleep promoted     Problem: Infection  Goal: Absence of Infection Signs and Symptoms  Outcome: Ongoing, Progressing  Intervention: Prevent or Manage Infection  Flowsheets (Taken 7/29/2022 0041)  Infection Management: aseptic technique maintained  Isolation Precautions:   precautions maintained   airborne   contact   droplet     Problem: Fall Injury Risk  Goal: Absence of Fall and Fall-Related Injury  Outcome: Ongoing, Progressing  Intervention: Identify and Manage Contributors  Flowsheets (Taken 7/29/2022 0041)  Self-Care Promotion:   independence encouraged   safe use of adaptive equipment encouraged  Medication Review/Management: high-risk medications identified  Intervention: Promote Injury-Free Environment  Flowsheets (Taken 7/29/2022  0041)  Safety Promotion/Fall Prevention:   assistive device/personal item within reach   bed alarm set   Fall Risk reviewed with patient/family   muscle strengthening facilitated   nonskid shoes/socks when out of bed   side rails raised x 3   supervised activity   Supervised toileting - stay within arms reach   instructed to call staff for mobility     Problem: Urinary Retention  Goal: Effective Urinary Elimination  Outcome: Ongoing, Progressing  Intervention: Promote Effective Urine Elimination  Flowsheets (Taken 7/29/2022 0041)  Urinary Elimination Promotion: catheter patency maintained

## 2022-07-29 NOTE — PLAN OF CARE
Family would like referral sent to [facility]. Referral faxed. [place] of choice on file.

## 2022-07-29 NOTE — PT/OT/SLP PROGRESS
Occupational Therapy  Treatment    Name: Adal Phoenix    : 1940 (81 y.o.)  MRN: [id number]           TREATMENT SUMMARY AND RECOMMENDATIONS:      OT Date of Treatment: 22  OT Start Time: 920  OT Stop Time: 945  OT Total Time (min): 25 min      Subjective Assessment:   No complaints  Lethargic    Awake, alert, cooperative  Impulsive    Uncooperative   Flat affect    Agitated  c/o pain    Appropriate  c/o fatigue    Confused  Treated at bedside     Emotionally labile  Treated in gym/dept.      Other:        Therapy Precautions:    Cognitive deficits  Spinal precautions    Collar - hard  Sternal precautions    Collar - soft   TLSO    Fall risk  LSO    Hip precautions - posterior  Knee immobilizer    Hip precautions - anterior  WBAT    Impaired communication  Partial weightbearing    Oxygen  TTWB    PEG tube  NWB    Visual deficits      Hearing deficits   Other:        Treatment Objectives:     Mobility Training:    Mobility task Assist level Comments    Bed mobility Mod I     Transfer     Sit to stands transitions SBA  Pt with posterior lean for initial standing    Functional mobility CGA  Pt ambulated ~5 ft from EOB to bedside chair, with LOB for initiating ambulation due to increased posterior lean. No use of RW for transfer.   Sitting balance     Standing balance      Other:        ADL Training:    ADL Assist level Comments    Feeding     Grooming/hygiene     Bathing     Upper body dressing     Lower body dressing     Toileting     Toilet transfer     Adaptive equipment training     Other:           Therapeutic Exercise:   Exercise Sets Reps Comments   2# dowel  2 15 Bicep curls, chest press, shoulder raises, and forward/backward rows while seated in bedside chair.                         Additional Comments:  Orthostatics monitored as noted:   Supine: 101/63  Sitting EOB: 104/65  Standin/61    Assessment: Patient tolerated session well. Initially Pt reported he did not want to do anything but watch  the game on TV. However, once MD and OT in room, Pt was pleasant and willing to get up.     OT Plan: Continue with current POC   Revisions made to plan of care: No    GOALS:   Multidisciplinary Problems     Occupational Therapy Goals        Problem: Occupational Therapy    Goal Priority Disciplines Outcome Interventions   Occupational Therapy Goal     OT, PT/OT Ongoing, Progressing    Description: Goals to be met by: 8/4/22     Patient will increase functional independence with ADLs by performing:    Toileting from bedside commode with Contact Guard Assistance for hygiene and clothing management.   Toilet transfer to bedside commode with Contact Guard Assistance.                     Skilled OT Minutes Provided: 25  Communication with Treatment Team:     Equipment recommendations:       At end of treatment, patient remained:  X Up in chair     Up in wheelchair in room    In bed   X With alarm activated    Bed rails up   X Call bell in reach     Family/friends present    Restraints secured properly    In bathroom with CNA/RN notified    In gym with PT/PTA/tech    Nurse aware    Other:

## 2022-07-29 NOTE — NURSING
Phone call returned to patient's daughter Dora, update given via phone of patient's current status at this time

## 2022-07-29 NOTE — ASSESSMENT & PLAN NOTE
-Patient has a history ischemic cardiomyopathy with an ejection fraction of 30% and grade II diastolic dysfunction per record review in 2019  -a [employer]onic Evera MRI XT DR Defibrillator implanted. It a complete MR Conditional system implanted, consisting of a SureScan device and Surescan leads. Implanted on dec 23, 2019. He sees Dr. Sathish Lockwood and Dr Mert Simmons at [facility]--> interrogated 714  -continue with Entresto, spironolactone, Lasix, ranolazine, discontinued metoprolol on 07/28 secondary to symptomatic orthostatic hypotension

## 2022-07-29 NOTE — PLAN OF CARE
[facility]  Discharge Final Note    Primary Care Provider: Fernandez Castillo MD    Expected Discharge Date:     Final Discharge Note (most recent)     Final Note - 07/29/22 0826        Final Note    Assessment Type Final Discharge Note     Anticipated Discharge Disposition Admitted as an Inpatient     What phone number can be called within the next 1-3 days to see how you are doing after discharge? [phone]     Hospital Resources/Appts/Education Provided Provided patient/caregiver with written discharge plan information        Post-Acute Status    Post-Acute Authorization Home Health     Home Health Status Referrals Sent     Discharge Delays None known at this time                 Important Message from Medicare

## 2022-07-29 NOTE — PT/OT/SLP PROGRESS
Physical Therapy Treatment Note           Name: Adal Phoenix    : 1940 (81 y.o.)  MRN: [id number]           TREATMENT SUMMARY AND RECOMMENDATIONS:    PT Received On: 22  PT Start Time: 1300     PT Stop Time: 1310  PT Total Time (min): 10 min     Subjective Assessment:  x No complaints  Lethargic    Awake, alert, cooperative  Uncooperative    Agitated  c/o pain    Appropriate  c/o fatigue    Confused  Treated at bedside     Emotionally labile  Treated in gym/dept.    Impulsive  Other:    Flat affect       Therapy Precautions:    Cognitive deficits  Spinal precautions    Collar - hard  Sternal precautions    Collar - soft   TLSO   x Fall risk  LSO    Hip precautions - posterior  Knee immobilizer    Hip precautions - anterior  WBAT    Impaired communication  Partial weightbearing    Oxygen  TTWB    PEG tube  NWB    Visual deficits  Other:    Hearing deficits          Treatment Objectives:     Mobility Training:   Assist level Comments    Bed mobility     Transfer     Gait SBA Amb on firm surface with RW 15 ft    Sit to stand transitions     Sitting balance     Standing balance      Wheelchair mobility     Car transfer     Other:          Therapeutic Exercise:   Exercise Sets Reps Comments                               Additional Comments:  Pt requesting to return supine upon arrival    Assessment: Patient tolerated session good.    PT Plan: continue with endurance act  Revisions made to plan of care: No    GOALS:   Multidisciplinary Problems     Physical Therapy Goals        Problem: Physical Therapy    Goal Priority Disciplines Outcome Goal Variances Interventions   Physical Therapy Goal     PT, PT/OT Ongoing, Progressing     Description: Goals to be met by: Discharge     Patient will increase functional independence with mobility by performin. Supine to sit with Modified [place]  2. Sit to supine with Modified [place]  3. Sit to stand transfer with Modified [place]  4. Bed to  chair transfer with Modified [place] using Rolling Walker vs LRAD  5. Gait  x 200 feet with Modified [place] using Rolling Walker vs LRAD.   6. Ascend/descend 5 stair with left Handrails Stand-by Assistance using No Assistive Device.                      Skilled PT Minutes Provided: 10   Communication with Treatment Team:     Equipment recommendations:       At end of treatment, patient remained:   Up in chair     Up in wheelchair in room   x In bed   x With alarm activated   x Bed rails up   x Call bell in reach     Family/friends present    Restraints secured properly    In bathroom with CNA/RN notified    Nurse aware    In gym with therapist/tech    Other:

## 2022-07-29 NOTE — PLAN OF CARE
[facility] - Medical Surgical Unit  Initial Discharge Assessment       Primary Care Provider: Fernandez Castillo MD    Admission Diagnosis: Orthostatic hypotension [I95.1]  Elevated liver enzymes [R74.8]  COVID-19 virus infection [U07.1]    Admission Date: 7/27/2022  Expected Discharge Date:     Discharge Barriers Identified: None    Payor: MEDICARE / Plan: MEDICARE PART A & B / Product Type: Government /     Extended Emergency Contact Information  Primary Emergency Contact: Dora Causey  Mobile Phone: [phone]  Relation: Daughter  Preferred language: English   needed? No  Secondary Emergency Contact: Adal Gaona III  Mobile Phone: [phone]  Relation: Son   needed? No    Discharge Plan A: Home Health, Home with family  Discharge Plan B: New Nursing Home placement - [facility] care facility      [facility] [address]  [address] [facility] [address]  Phone: [phone] Fax: [phone]      Initial Assessment (most recent)     Adult Discharge Assessment - 07/29/22 0827        Discharge Assessment    Assessment Type Discharge Planning Assessment     Confirmed/corrected address, phone number and insurance Yes     Confirmed Demographics Correct on Facesheet     Source of Information patient     If unable to respond/provide information was family/caregiver contacted? Yes     Contact Name/Number Dora Causey daughter [phone]     Communicated AUSTYN with patient/caregiver Date not available/Unable to determine     Reason For Admission COVID     Lives With child(juventino), adult     Facility Arrived From: HOME     Do you expect to return to your current living situation? Yes     Do you have help at home or someone to help you manage your care at home? Yes     Who are your caregiver(s) and their phone number(s)? Dora Causey daughter [phone]     Prior to hospitilization cognitive status: Alert/Oriented     Current cognitive status:  Alert/Oriented     Walking or Climbing Stairs Difficulty none     Dressing/Bathing Difficulty none     Home Layout Able to live on 1st floor     Equipment Currently Used at Home wheelchair;walker, standard     Readmission within 30 days? No     Do you currently have service(s) that help you manage your care at home? No     Do you take prescription medications? No     Do you have prescription coverage? Yes     Do you have any problems affording any of your prescribed medications? TBD     Is the patient taking medications as prescribed? yes     Who is going to help you get home at discharge? Dora Causey daughter [phone]     How do you get to doctors appointments? family or friend will provide     Are you on dialysis? No     Do you take coumadin? No     Discharge Plan A Home Health;Home with family     Discharge Plan B New Nursing Home placement - snf care facility     DME Needed Upon Discharge  none     Discharge Plan discussed with: Adult children     Discharge Barriers Identified None        Relationship/Environment    Name(s) of Who Lives With Patient Dora Causey daughter [phone]

## 2022-07-29 NOTE — PROGRESS NOTES
[facility] - [facility]  [facility]  Progress Note    Patient Name: Adal Phoenix  MRN: [id number]  Patient Class: IP- Inpatient   Admission Date: 7/27/2022  Length of Stay: 0 days  Attending Physician: Thairy G Reyes, DO  Primary Care Provider: Fernandez Castillo MD        Subjective:     Principal Problem:COVID-19 virus infection        HPI:  81-year-old male with a past medical history of prior MI status post PCI, hypertension, hyperlipidemia who was transferred from [facility] as they had no beds for admission; he was diagnosed with COVID infection 5 days ago and since then he has had a headache, nausea and dizziness and states he just has not felt well; also with a cough and clear sputum; he was to be admitted to [facility] but they had no beds and fell because his blood pressure showed orthostatic hypotension he should be admitted.  Patient also with significantly elevated LFTs compared to baseline a few months ago.  Right upper quadrant ultrasound unremarkable, suspicion for ischemic hepatitis.  During eval here pt states he presented with complaint of passing out. Patient reports for the last 2 weeks his knees have been giving out under him and he falls down however last night for the 1st time he does not recall events leading up to his fall.  Patient does affirm to living alone.  CT head without contrast showed chronic microvascular changes.  Chest x-ray showed no acute thoracic abnormality.      Overview/Hospital Course:  Patient admitted for syncopal episode and incidentally found to have elevated LFTs likely secondary to ischemic hepatitis from volume depletion secondary to COVID-19.  However while admitted at our facility patient has been noted to have significant orthostatic hypotension.  This is likely the source of patient's syncopal episodes outpatient.  Discontinued metoprolol, will monitor response.  As far as COVID-19 patient is asymptomatic and not requiring any supplemental  oxygen.  When patient arrived as a transfer he had Bee catheter in place for acute retention.  Bee catheter was removed and he required intermittent catheterization twice overnight.  Bee catheter will be replaced and patient will be discharged with a Bee catheter.      Interval History:  No acute complaints.    Review of Systems   Constitutional:  Negative for fatigue and fever.   HENT:  Negative for congestion, ear pain, postnasal drip, sinus pain and sore throat.    Eyes:  Negative for pain, redness and visual disturbance.   Respiratory:  Negative for cough, shortness of breath and wheezing.    Cardiovascular:  Negative for chest pain, palpitations and leg swelling.   Gastrointestinal:  Negative for abdominal pain, diarrhea, nausea and vomiting.   Endocrine: Negative for cold intolerance and heat intolerance.   Musculoskeletal:  Negative for arthralgias, joint swelling and myalgias.   Skin:  Negative for rash and wound.   Neurological:  Negative for dizziness, weakness, numbness and headaches.   Objective:     Vital Signs (Most Recent):  Temp: 97.5 °F (36.4 °C) (07/29/22 1252)  Pulse: 67 (07/29/22 1252)  Resp: 18 (07/29/22 1252)  BP: 132/74 (07/29/22 1252)  SpO2: 95 % (07/29/22 1252)   Vital Signs (24h Range):  Temp:  [36.7 °F (2.6 °C)-98.1 °F (36.7 °C)] 97.5 °F (36.4 °C)  Pulse:  [66-91] 67  Resp:  [18-20] 18  SpO2:  [95 %-97 %] 95 %  BP: (102-132)/(48-74) 132/74     Weight: 93.6 kg (206 lb 5.6 oz)  Body mass index is 26.49 kg/m².    Intake/Output Summary (Last 24 hours) at 7/29/2022 1328  Last data filed at 7/29/2022 1207  Gross per 24 hour   Intake 960 ml   Output 1825 ml   Net -865 ml        Physical Exam  Constitutional:       General: He is not in acute distress.     Appearance: Normal appearance. He is obese.   HENT:      Head: Normocephalic and atraumatic.      Nose: No congestion or rhinorrhea.      Mouth/Throat:      Mouth: Mucous membranes are moist.   Eyes:      Conjunctiva/sclera: Conjunctivae  normal.      Pupils: Pupils are equal, round, and reactive to light.   Cardiovascular:      Rate and Rhythm: Normal rate and regular rhythm.      Heart sounds: No murmur heard.  Pulmonary:      Effort: Pulmonary effort is normal.      Breath sounds: Normal breath sounds. No wheezing.   Abdominal:      General: Bowel sounds are normal. There is no distension.      Palpations: Abdomen is soft.      Tenderness: There is no abdominal tenderness.   Musculoskeletal:         General: No swelling. Normal range of motion.      Cervical back: Normal range of motion and neck supple.      Right lower leg: No edema.      Left lower leg: No edema.   Skin:     General: Skin is warm and dry.      Findings: No rash.   Neurological:      General: No focal deficit present.      Mental Status: He is alert and oriented to person, place, and time.   Psychiatric:         Mood and Affect: Mood normal.         Behavior: Behavior normal.       Significant Labs: All pertinent labs within the past 24 hours have been reviewed.    Significant Imaging: I have reviewed all pertinent imaging results/findings within the past 24 hours.      Assessment/Plan:      * COVID-19 virus infection  -Conservative management, pt requiring no supplemental O2 at this time  -inflammatory markers mildly elevated  -Vit C, D, zinc    Elevated liver enzymes  -downtrending  -suspect ischemic hepatitis 2/2 hypotension  -Right upper quadrant ultrasound showed hepatic steatosis, normal caliber common bile duct, negative Rivers's    Orthostatic hypotension  -discontinued metoprolol on 7/28      CHF (congestive heart failure)  -Patient has a history ischemic cardiomyopathy with an ejection fraction of 30% and grade II diastolic dysfunction per record review in 2019  -a Medtronic Evera MRI XT DR Defibrillator implanted. It a complete MR Conditional system implanted, consisting of a SureScan device and Surescan leads. Implanted on dec 23, 2019. He sees Dr. Sathish Lockwood and   Mert Simmons at [facility]--> interrogated 714  -continue with Entresto, spironolactone, Lasix, ranolazine, discontinued metoprolol on 07/28 secondary to symptomatic orthostatic hypotension        Recurrent falls  -suspect secondary to orthostatic hypotension, metoprolol discontinued on 07/28; monitor response  - CT head showing chronic microvascular change  -PT/OT      Acute urinary retention  -discharge with Bee catheter        VTE Risk Mitigation (From admission, onward)         Ordered     Place LIVE hose  Until discontinued         07/28/22 1059     enoxaparin injection 40 mg  Daily         07/27/22 0033     IP VTE HIGH RISK PATIENT  Once         07/27/22 0033                Discharge Planning   AUSTYN:      Code Status: Full Code   Is the patient medically ready for discharge?:     Reason for patient still in hospital (select all that apply): PT / OT recommendations and Pending disposition  Discharge Plan A: Home Health, Home with family   Discharge Delays: None known at this time              Thairy G Reyes, DO  Department of Hospital Medicine   [facility]

## 2022-07-29 NOTE — SUBJECTIVE & OBJECTIVE
Interval History:  No acute complaints.    Review of Systems   Constitutional:  Negative for fatigue and fever.   HENT:  Negative for congestion, ear pain, postnasal drip, sinus pain and sore throat.    Eyes:  Negative for pain, redness and visual disturbance.   Respiratory:  Negative for cough, shortness of breath and wheezing.    Cardiovascular:  Negative for chest pain, palpitations and leg swelling.   Gastrointestinal:  Negative for abdominal pain, diarrhea, nausea and vomiting.   Endocrine: Negative for cold intolerance and heat intolerance.   Musculoskeletal:  Negative for arthralgias, joint swelling and myalgias.   Skin:  Negative for rash and wound.   Neurological:  Negative for dizziness, weakness, numbness and headaches.   Objective:     Vital Signs (Most Recent):  Temp: 97.5 °F (36.4 °C) (07/29/22 1252)  Pulse: 67 (07/29/22 1252)  Resp: 18 (07/29/22 1252)  BP: 132/74 (07/29/22 1252)  SpO2: 95 % (07/29/22 1252)   Vital Signs (24h Range):  Temp:  [36.7 °F (2.6 °C)-98.1 °F (36.7 °C)] 97.5 °F (36.4 °C)  Pulse:  [66-91] 67  Resp:  [18-20] 18  SpO2:  [95 %-97 %] 95 %  BP: (102-132)/(48-74) 132/74     Weight: 93.6 kg (206 lb 5.6 oz)  Body mass index is 26.49 kg/m².    Intake/Output Summary (Last 24 hours) at 7/29/2022 1328  Last data filed at 7/29/2022 1207  Gross per 24 hour   Intake 960 ml   Output 1825 ml   Net -865 ml        Physical Exam  Constitutional:       General: He is not in acute distress.     Appearance: Normal appearance. He is obese.   HENT:      Head: Normocephalic and atraumatic.      Nose: No congestion or rhinorrhea.      Mouth/Throat:      Mouth: Mucous membranes are moist.   Eyes:      Conjunctiva/sclera: Conjunctivae normal.      Pupils: Pupils are equal, round, and reactive to light.   Cardiovascular:      Rate and Rhythm: Normal rate and regular rhythm.      Heart sounds: No murmur heard.  Pulmonary:      Effort: Pulmonary effort is normal.      Breath sounds: Normal breath sounds. No  wheezing.   Abdominal:      General: Bowel sounds are normal. There is no distension.      Palpations: Abdomen is soft.      Tenderness: There is no abdominal tenderness.   Musculoskeletal:         General: No swelling. Normal range of motion.      Cervical back: Normal range of motion and neck supple.      Right lower leg: No edema.      Left lower leg: No edema.   Skin:     General: Skin is warm and dry.      Findings: No rash.   Neurological:      General: No focal deficit present.      Mental Status: He is alert and oriented to person, place, and time.   Psychiatric:         Mood and Affect: Mood normal.         Behavior: Behavior normal.       Significant Labs: All pertinent labs within the past 24 hours have been reviewed.    Significant Imaging: I have reviewed all pertinent imaging results/findings within the past 24 hours.

## 2022-07-30 PROCEDURE — 63600175 PHARM REV CODE 636 W HCPCS: Performed by: STUDENT IN AN ORGANIZED HEALTH CARE EDUCATION/TRAINING PROGRAM

## 2022-07-30 PROCEDURE — 25000003 PHARM REV CODE 250: Performed by: STUDENT IN AN ORGANIZED HEALTH CARE EDUCATION/TRAINING PROGRAM

## 2022-07-30 PROCEDURE — 97116 GAIT TRAINING THERAPY: CPT

## 2022-07-30 PROCEDURE — 11000001 HC ACUTE MED/SURG PRIVATE ROOM

## 2022-07-30 RX ADMIN — SENNOSIDES, DOCUSATE SODIUM 1 TABLET: 8.6; 5 TABLET ORAL at 08:07

## 2022-07-30 RX ADMIN — Medication 1000 UNITS: at 08:07

## 2022-07-30 RX ADMIN — FAMOTIDINE 20 MG: 20 TABLET ORAL at 08:07

## 2022-07-30 RX ADMIN — SPIRONOLACTONE 12.5 MG: 25 TABLET, FILM COATED ORAL at 04:07

## 2022-07-30 RX ADMIN — RANOLAZINE 500 MG: 500 TABLET, EXTENDED RELEASE ORAL at 08:07

## 2022-07-30 RX ADMIN — LEVOTHYROXINE SODIUM 125 MCG: 125 TABLET ORAL at 04:07

## 2022-07-30 RX ADMIN — OXYCODONE HYDROCHLORIDE AND ACETAMINOPHEN 500 MG: 500 TABLET ORAL at 08:07

## 2022-07-30 RX ADMIN — AMIODARONE HYDROCHLORIDE 200 MG: 200 TABLET ORAL at 08:07

## 2022-07-30 RX ADMIN — ASPIRIN 81 MG CHEWABLE TABLET 81 MG: 81 TABLET CHEWABLE at 04:07

## 2022-07-30 RX ADMIN — SACUBITRIL AND VALSARTAN 1 TABLET: 24; 26 TABLET, FILM COATED ORAL at 08:07

## 2022-07-30 RX ADMIN — ZINC SULFATE 220 MG (50 MG) CAPSULE 220 MG: CAPSULE at 08:07

## 2022-07-30 RX ADMIN — ATORVASTATIN CALCIUM 40 MG: 40 TABLET, FILM COATED ORAL at 08:07

## 2022-07-30 RX ADMIN — FUROSEMIDE 20 MG: 20 TABLET ORAL at 08:07

## 2022-07-30 RX ADMIN — TAMSULOSIN HYDROCHLORIDE 0.4 MG: 0.4 CAPSULE ORAL at 08:07

## 2022-07-30 RX ADMIN — ENOXAPARIN SODIUM 40 MG: 100 INJECTION SUBCUTANEOUS at 04:07

## 2022-07-30 NOTE — PLAN OF CARE
Problem: Adult Inpatient Plan of Care  Goal: Plan of Care Review  Outcome: Ongoing, Progressing  Flowsheets (Taken 7/29/2022 2248)  Plan of Care Reviewed With: patient  Goal: Absence of Hospital-Acquired Illness or Injury  Outcome: Ongoing, Progressing  Intervention: Identify and Manage Fall Risk  Flowsheets (Taken 7/29/2022 2248)  Safety Promotion/Fall Prevention:   assistive device/personal item within reach   bed alarm set   Fall Risk reviewed with patient/family   gait belt with ambulation   nonskid shoes/socks when out of bed   muscle strengthening facilitated   side rails raised x 3   supervised activity   Supervised toileting - stay within arms reach   instructed to call staff for mobility  Intervention: Prevent Skin Injury  Flowsheets (Taken 7/29/2022 2248)  Body Position: position changed independently  Skin Protection: incontinence pads utilized  Intervention: Prevent and Manage VTE (Venous Thromboembolism) Risk  Flowsheets (Taken 7/29/2022 2248)  Activity Management: Ambulated in room - L4  VTE Prevention/Management: ambulation promoted  Range of Motion: active ROM (range of motion) encouraged  Intervention: Prevent Infection  Flowsheets (Taken 7/29/2022 2248)  Infection Prevention:   hand hygiene promoted   personal protective equipment utilized   rest/sleep promoted     Problem: Infection  Goal: Absence of Infection Signs and Symptoms  Outcome: Ongoing, Progressing  Intervention: Prevent or Manage Infection  Flowsheets (Taken 7/29/2022 2248)  Infection Management: aseptic technique maintained  Isolation Precautions:   precautions maintained   airborne   contact   droplet     Problem: Fall Injury Risk  Goal: Absence of Fall and Fall-Related Injury  Outcome: Ongoing, Progressing  Intervention: Identify and Manage Contributors  Flowsheets (Taken 7/29/2022 2248)  Self-Care Promotion:   independence encouraged   safe use of adaptive equipment encouraged  Intervention: Promote Injury-Free  Environment  Flowsheets (Taken 7/29/2022 2248)  Safety Promotion/Fall Prevention:   assistive device/personal item within reach   bed alarm set   Fall Risk reviewed with patient/family   gait belt with ambulation   nonskid shoes/socks when out of bed   muscle strengthening facilitated   side rails raised x 3   supervised activity   Supervised toileting - stay within arms reach   instructed to call staff for mobility     Problem: Urinary Retention  Goal: Effective Urinary Elimination  Outcome: Ongoing, Progressing  Intervention: Promote Effective Urine Elimination  Flowsheets (Taken 7/29/2022 2248)  Urinary Elimination Promotion: catheter patency maintained

## 2022-07-30 NOTE — PT/OT/SLP PROGRESS
Physical Therapy Treatment Note           Name: Adal Phoenix    : 1940 (81 y.o.)  MRN: [id number]           TREATMENT SUMMARY AND RECOMMENDATIONS:    PT Received On: 22  PT Start Time: 1300     PT Stop Time: 1315  PT Total Time (min): 15 min     Subjective Assessment:  X No complaints  Lethargic    Awake, alert, cooperative  Uncooperative    Agitated  c/o pain    Appropriate  c/o fatigue    Confused X Treated at bedside     Emotionally labile  Treated in gym/dept.    Impulsive  Other:    Flat affect       Therapy Precautions:    Cognitive deficits  Spinal precautions    Collar - hard  Sternal precautions    Collar - soft   TLSO   X Fall risk  LSO    Hip precautions - posterior  Knee immobilizer    Hip precautions - anterior  WBAT    Impaired communication  Partial weightbearing    Oxygen  TTWB    PEG tube  NWB    Visual deficits  Other:    Hearing deficits          Treatment Objectives:     Mobility Training:   Assist level Comments    Bed mobility     Transfer     Gait CGA x175 ft with RW   Sit to stand transitions SBA x2SBA throughout treatment   Sitting balance     Standing balance      Wheelchair mobility     Car transfer     Other:          Therapeutic Exercise:   Exercise Sets Reps Comments                               Additional Comments:  Pt was recently removed from isolation precautions, ambulated well without fatigue, O2 sats 97% post-tx with HR 120bpm    Assessment: Patient tolerated session well.    PT Plan: Cont PT POC  Revisions made to plan of care: No    GOALS:   Multidisciplinary Problems     Physical Therapy Goals        Problem: Physical Therapy    Goal Priority Disciplines Outcome Goal Variances Interventions   Physical Therapy Goal     PT, PT/OT Ongoing, Progressing     Description: Goals to be met by: Discharge     Patient will increase functional independence with mobility by performin. Supine to sit with Modified [place]  2. Sit to supine with Modified  [place]  3. Sit to stand transfer with Modified [place]  4. Bed to chair transfer with Modified [place] using Rolling Walker vs LRAD  5. Gait  x 200 feet with Modified [place] using Rolling Walker vs LRAD.   6. Ascend/descend 5 stair with left Handrails Stand-by Assistance using No Assistive Device.                      Skilled PT Minutes Provided: 15   Communication with Treatment Team:     Equipment recommendations:       At end of treatment, patient remained:  X Up in chair     Up in wheelchair in room    In bed   X With alarm activated    Bed rails up   X Call bell in reach     Family/friends present    Restraints secured properly    In bathroom with CNA/RN notified   X Nurse aware    In gym with therapist/tech    Other:

## 2022-07-30 NOTE — PLAN OF CARE
Patient transferred rooms after covid isolation complete, patient up in chair today and ambulated to the bathroom with assistance, hernandez catheter for urinary retention, no c/o of pain.  Problem: Adult Inpatient Plan of Care  Goal: Plan of Care Review  Outcome: Ongoing, Progressing  Goal: Absence of Hospital-Acquired Illness or Injury  Outcome: Ongoing, Progressing  Intervention: Identify and Manage Fall Risk  Flowsheets (Taken 7/30/2022 1557)  Safety Promotion/Fall Prevention:   assistive device/personal item within reach   bed alarm set   nonskid shoes/socks when out of bed   instructed to call staff for mobility   gait belt with ambulation   medications reviewed  Intervention: Prevent Skin Injury  Flowsheets (Taken 7/30/2022 1557)  Body Position:   position changed independently   sitting up in bed  Skin Protection:   incontinence pads utilized   tubing/devices free from skin contact  Intervention: Prevent and Manage VTE (Venous Thromboembolism) Risk  Flowsheets (Taken 7/30/2022 1557)  Activity Management: Up in chair - L3  VTE Prevention/Management: remove, assess skin, and reapply sequential compression device  Range of Motion: active ROM (range of motion) encouraged  Intervention: Prevent Infection  Flowsheets (Taken 7/30/2022 1557)  Infection Prevention:   hand hygiene promoted   single patient room provided   equipment surfaces disinfected     Problem: Infection  Goal: Absence of Infection Signs and Symptoms  Outcome: Ongoing, Progressing     Problem: Fall Injury Risk  Goal: Absence of Fall and Fall-Related Injury  Outcome: Ongoing, Progressing  Intervention: Identify and Manage Contributors  Flowsheets (Taken 7/30/2022 1557)  Self-Care Promotion: safe use of adaptive equipment encouraged  Medication Review/Management: medications reviewed  Intervention: Promote Injury-Free Environment  Flowsheets (Taken 7/30/2022 1557)  Safety Promotion/Fall Prevention:   assistive device/personal item within reach   bed  alarm set   nonskid shoes/socks when out of bed   instructed to call staff for mobility   gait belt with ambulation   medications reviewed     Problem: Urinary Retention  Goal: Effective Urinary Elimination  Outcome: Ongoing, Progressing

## 2022-07-30 NOTE — ASSESSMENT & PLAN NOTE
-Patient has a history ischemic cardiomyopathy with an ejection fraction of 30% and grade II diastolic dysfunction per record review in 2019  -a Medtronic Evera MRI XT DR Defibrillator implanted. It a complete MR Conditional system implanted, consisting of a SureScan device and Surescan leads. Implanted on dec 23, 2019. He sees Dr. Sathish Lockwood and Dr Mert Simmons at [facility]--> interrogated 714  -continue with Entresto, spironolactone, Lasix, ranolazine, discontinued metoprolol on 07/28 secondary to symptomatic orthostatic hypotension

## 2022-07-30 NOTE — PROGRESS NOTES
[facility]  [facility] Medicine  Progress Note    Patient Name: Adal Phoenix  MRN: [id number]  Patient Class: IP- Inpatient   Admission Date: 7/27/2022  Length of Stay: 1 days  Attending Physician: Thairy G Reyes, DO  Primary Care Provider: Fernandez Castillo MD        Subjective:     Principal Problem:COVID-19 virus infection        HPI:  81-year-old male with a past medical history of prior MI status post PCI, hypertension, hyperlipidemia who was transferred from [facility] as they had no beds for admission; he was diagnosed with COVID infection 5 days ago and since then he has had a headache, nausea and dizziness and states he just has not felt well; also with a cough and clear sputum; he was to be admitted to [place] but they had no beds and fell because his blood pressure showed orthostatic hypotension he should be admitted.  Patient also with significantly elevated LFTs compared to baseline a few months ago.  Right upper quadrant ultrasound unremarkable, suspicion for ischemic hepatitis.  During eval here pt states he presented with complaint of passing out. Patient reports for the last 2 weeks his knees have been giving out under him and he falls down however last night for the 1st time he does not recall events leading up to his fall.  Patient does affirm to living alone.  CT head without contrast showed chronic microvascular changes.  Chest x-ray showed no acute thoracic abnormality.      Overview/Hospital Course:  Patient admitted for syncopal episode and incidentally found to have elevated LFTs likely secondary to ischemic hepatitis from volume depletion secondary to COVID-19.  However while admitted at our facility patient has been noted to have significant orthostatic hypotension.  This is likely the source of patient's syncopal episodes outpatient.  Discontinued metoprolol, will monitor response.  As far as COVID-19 patient is asymptomatic and not requiring any supplemental  oxygen.  When patient arrived as a transfer he had Bee catheter in place for acute retention.  Bee catheter was removed and he required intermittent catheterization twice overnight.  Bee catheter will be replaced and patient will be discharged with a Bee catheter.      Interval History:  Doing well, no further episodes of orthostasis.     Review of Systems   Constitutional:  Negative for fatigue and fever.   HENT:  Negative for congestion, ear pain, postnasal drip, sinus pain and sore throat.    Eyes:  Negative for pain, redness and visual disturbance.   Respiratory:  Negative for cough, shortness of breath and wheezing.    Cardiovascular:  Negative for chest pain, palpitations and leg swelling.   Gastrointestinal:  Negative for abdominal pain, diarrhea, nausea and vomiting.   Endocrine: Negative for cold intolerance and heat intolerance.   Musculoskeletal:  Negative for arthralgias, joint swelling and myalgias.   Skin:  Negative for rash and wound.   Neurological:  Negative for dizziness, weakness, numbness and headaches.   Objective:     Vital Signs (Most Recent):  Temp: 97.8 °F (36.6 °C) (07/30/22 0700)  Pulse: 76 (07/30/22 0700)  Resp: 18 (07/30/22 0357)  BP: 138/72 (07/30/22 0700)  SpO2: 98 % (07/30/22 0700)   Vital Signs (24h Range):  Temp:  [97.6 °F (36.4 °C)-98.3 °F (36.8 °C)] 97.8 °F (36.6 °C)  Pulse:  [68-96] 76  Resp:  [17-18] 18  SpO2:  [96 %-98 %] 98 %  BP: ()/(56-72) 138/72     Weight: 93.6 kg (206 lb 5.6 oz)  Body mass index is 26.49 kg/m².    Intake/Output Summary (Last 24 hours) at 7/30/2022 1416  Last data filed at 7/30/2022 1300  Gross per 24 hour   Intake 1020 ml   Output 3800 ml   Net -2780 ml        Physical Exam  Constitutional:       General: He is not in acute distress.     Appearance: Normal appearance. He is obese.   HENT:      Head: Normocephalic and atraumatic.      Nose: No congestion or rhinorrhea.      Mouth/Throat:      Mouth: Mucous membranes are moist.   Eyes:       Conjunctiva/sclera: Conjunctivae normal.      Pupils: Pupils are equal, round, and reactive to light.   Cardiovascular:      Rate and Rhythm: Normal rate and regular rhythm.      Heart sounds: No murmur heard.  Pulmonary:      Effort: Pulmonary effort is normal.      Breath sounds: Normal breath sounds. No wheezing.   Abdominal:      General: Bowel sounds are normal. There is no distension.      Palpations: Abdomen is soft.      Tenderness: There is no abdominal tenderness.   Musculoskeletal:         General: No swelling. Normal range of motion.      Cervical back: Normal range of motion and neck supple.      Right lower leg: No edema.      Left lower leg: No edema.   Skin:     General: Skin is warm and dry.      Findings: No rash.   Neurological:      General: No focal deficit present.      Mental Status: He is alert and oriented to person, place, and time.   Psychiatric:         Mood and Affect: Mood normal.         Behavior: Behavior normal.       Significant Labs: All pertinent labs within the past 24 hours have been reviewed.    Significant Imaging: I have reviewed all pertinent imaging results/findings within the past 24 hours.      Assessment/Plan:      * COVID-19 virus infection  -Conservative management, pt requiring no supplemental O2 at this time  -inflammatory markers mildly elevated  -Vit C, D, zinc    Elevated liver enzymes  -downtrending  -suspect ischemic hepatitis 2/2 hypotension  -Right upper quadrant ultrasound showed hepatic steatosis, normal caliber common bile duct, negative Rivers's    Orthostatic hypotension  -discontinued metoprolol on 7/28      CHF (congestive heart failure)  -Patient has a history ischemic cardiomyopathy with an ejection fraction of 30% and grade II diastolic dysfunction per record review in 2019  -a Medtronic Evera MRI XT DR Defibrillator implanted. It a complete MR Conditional system implanted, consisting of a SureScan device and Surescan leads. Implanted on dec 23,  2019. He sees Dr. Sathish Lockwood and Dr Mert Simmons at [facility]--> interrogated 714  -continue with Entresto, spironolactone, Lasix, ranolazine, discontinued metoprolol on 07/28 secondary to symptomatic orthostatic hypotension        Recurrent falls  -suspect secondary to orthostatic hypotension, metoprolol discontinued on 07/28; monitor response  - CT head showing chronic microvascular change  -PT/OT      Acute urinary retention  -discharge with Bee catheter        VTE Risk Mitigation (From admission, onward)         Ordered     Place LIVE hose  Until discontinued         07/28/22 1059     enoxaparin injection 40 mg  Daily         07/27/22 0033     IP VTE HIGH RISK PATIENT  Once         07/27/22 0033                Discharge Planning   AUSTYN:      Code Status: Full Code   Is the patient medically ready for discharge?:     Reason for patient still in hospital (select all that apply): PT / OT recommendations  Discharge Plan A: Home Health, Home with family   Discharge Delays: None known at this time              Thairy G Reyes, DO  Department of Hospital Medicine   [facility]

## 2022-07-30 NOTE — SUBJECTIVE & OBJECTIVE
Interval History:  Doing well, no further episodes of orthostasis.     Review of Systems   Constitutional:  Negative for fatigue and fever.   HENT:  Negative for congestion, ear pain, postnasal drip, sinus pain and sore throat.    Eyes:  Negative for pain, redness and visual disturbance.   Respiratory:  Negative for cough, shortness of breath and wheezing.    Cardiovascular:  Negative for chest pain, palpitations and leg swelling.   Gastrointestinal:  Negative for abdominal pain, diarrhea, nausea and vomiting.   Endocrine: Negative for cold intolerance and heat intolerance.   Musculoskeletal:  Negative for arthralgias, joint swelling and myalgias.   Skin:  Negative for rash and wound.   Neurological:  Negative for dizziness, weakness, numbness and headaches.   Objective:     Vital Signs (Most Recent):  Temp: 97.8 °F (36.6 °C) (07/30/22 0700)  Pulse: 76 (07/30/22 0700)  Resp: 18 (07/30/22 0357)  BP: 138/72 (07/30/22 0700)  SpO2: 98 % (07/30/22 0700)   Vital Signs (24h Range):  Temp:  [97.6 °F (36.4 °C)-98.3 °F (36.8 °C)] 97.8 °F (36.6 °C)  Pulse:  [68-96] 76  Resp:  [17-18] 18  SpO2:  [96 %-98 %] 98 %  BP: ()/(56-72) 138/72     Weight: 93.6 kg (206 lb 5.6 oz)  Body mass index is 26.49 kg/m².    Intake/Output Summary (Last 24 hours) at 7/30/2022 1416  Last data filed at 7/30/2022 1300  Gross per 24 hour   Intake 1020 ml   Output 3800 ml   Net -2780 ml        Physical Exam  Constitutional:       General: He is not in acute distress.     Appearance: Normal appearance. He is obese.   HENT:      Head: Normocephalic and atraumatic.      Nose: No congestion or rhinorrhea.      Mouth/Throat:      Mouth: Mucous membranes are moist.   Eyes:      Conjunctiva/sclera: Conjunctivae normal.      Pupils: Pupils are equal, round, and reactive to light.   Cardiovascular:      Rate and Rhythm: Normal rate and regular rhythm.      Heart sounds: No murmur heard.  Pulmonary:      Effort: Pulmonary effort is normal.      Breath  sounds: Normal breath sounds. No wheezing.   Abdominal:      General: Bowel sounds are normal. There is no distension.      Palpations: Abdomen is soft.      Tenderness: There is no abdominal tenderness.   Musculoskeletal:         General: No swelling. Normal range of motion.      Cervical back: Normal range of motion and neck supple.      Right lower leg: No edema.      Left lower leg: No edema.   Skin:     General: Skin is warm and dry.      Findings: No rash.   Neurological:      General: No focal deficit present.      Mental Status: He is alert and oriented to person, place, and time.   Psychiatric:         Mood and Affect: Mood normal.         Behavior: Behavior normal.       Significant Labs: All pertinent labs within the past 24 hours have been reviewed.    Significant Imaging: I have reviewed all pertinent imaging results/findings within the past 24 hours.

## 2022-07-31 PROBLEM — H01.005 BLEPHARITIS OF LEFT LOWER EYELID: Status: ACTIVE | Noted: 2022-07-31

## 2022-07-31 LAB
BACTERIA BLD CULT: NORMAL
BACTERIA BLD CULT: NORMAL

## 2022-07-31 PROCEDURE — 63600175 PHARM REV CODE 636 W HCPCS: Performed by: STUDENT IN AN ORGANIZED HEALTH CARE EDUCATION/TRAINING PROGRAM

## 2022-07-31 PROCEDURE — 25000003 PHARM REV CODE 250: Performed by: STUDENT IN AN ORGANIZED HEALTH CARE EDUCATION/TRAINING PROGRAM

## 2022-07-31 PROCEDURE — 11000001 HC ACUTE MED/SURG PRIVATE ROOM

## 2022-07-31 PROCEDURE — 94760 N-INVAS EAR/PLS OXIMETRY 1: CPT

## 2022-07-31 RX ORDER — BACITRACIN 500 [USP'U]/G
OINTMENT OPHTHALMIC 3 TIMES DAILY
Status: DISCONTINUED | OUTPATIENT
Start: 2022-08-01 | End: 2022-08-01

## 2022-07-31 RX ADMIN — SENNOSIDES, DOCUSATE SODIUM 1 TABLET: 8.6; 5 TABLET ORAL at 08:07

## 2022-07-31 RX ADMIN — TAMSULOSIN HYDROCHLORIDE 0.4 MG: 0.4 CAPSULE ORAL at 08:07

## 2022-07-31 RX ADMIN — AMIODARONE HYDROCHLORIDE 200 MG: 200 TABLET ORAL at 08:07

## 2022-07-31 RX ADMIN — RANOLAZINE 500 MG: 500 TABLET, EXTENDED RELEASE ORAL at 08:07

## 2022-07-31 RX ADMIN — Medication 1000 UNITS: at 08:07

## 2022-07-31 RX ADMIN — FAMOTIDINE 20 MG: 20 TABLET ORAL at 08:07

## 2022-07-31 RX ADMIN — SPIRONOLACTONE 12.5 MG: 25 TABLET, FILM COATED ORAL at 04:07

## 2022-07-31 RX ADMIN — ASPIRIN 81 MG CHEWABLE TABLET 81 MG: 81 TABLET CHEWABLE at 04:07

## 2022-07-31 RX ADMIN — ATORVASTATIN CALCIUM 40 MG: 40 TABLET, FILM COATED ORAL at 08:07

## 2022-07-31 RX ADMIN — FUROSEMIDE 20 MG: 20 TABLET ORAL at 08:07

## 2022-07-31 RX ADMIN — LEVOTHYROXINE SODIUM 125 MCG: 125 TABLET ORAL at 04:07

## 2022-07-31 RX ADMIN — OXYCODONE HYDROCHLORIDE AND ACETAMINOPHEN 500 MG: 500 TABLET ORAL at 08:07

## 2022-07-31 RX ADMIN — MELATONIN TAB 3 MG 9 MG: 3 TAB at 08:07

## 2022-07-31 RX ADMIN — ZINC SULFATE 220 MG (50 MG) CAPSULE 220 MG: CAPSULE at 08:07

## 2022-07-31 RX ADMIN — SACUBITRIL AND VALSARTAN 1 TABLET: 24; 26 TABLET, FILM COATED ORAL at 08:07

## 2022-07-31 RX ADMIN — ENOXAPARIN SODIUM 40 MG: 100 INJECTION SUBCUTANEOUS at 04:07

## 2022-07-31 NOTE — ASSESSMENT & PLAN NOTE
-Conservative management, pt requiring no supplemental O2 at this time  -inflammatory markers mildly elevated  -Vit C, D, zinc  -out of isolation

## 2022-07-31 NOTE — PLAN OF CARE
Problem: Adult Inpatient Plan of Care  Goal: Plan of Care Review  Outcome: Ongoing, Progressing  Flowsheets (Taken 7/31/2022 1725)  Plan of Care Reviewed With: patient  Goal: Absence of Hospital-Acquired Illness or Injury  Outcome: Ongoing, Progressing  Intervention: Identify and Manage Fall Risk  Flowsheets (Taken 7/31/2022 1725)  Safety Promotion/Fall Prevention:   assistive device/personal item within reach   bed alarm set   instructed to call staff for mobility  Intervention: Prevent Skin Injury  Flowsheets (Taken 7/31/2022 1725)  Body Position: position changed independently  Skin Protection:   incontinence pads utilized   protective footwear used  Intervention: Prevent and Manage VTE (Venous Thromboembolism) Risk  Flowsheets (Taken 7/31/2022 1725)  Activity Management: Walk with assistive devise and /or staff member - L3  Intervention: Prevent Infection  Flowsheets (Taken 7/31/2022 1725)  Infection Prevention:   hand hygiene promoted   equipment surfaces disinfected     Problem: Infection  Goal: Absence of Infection Signs and Symptoms  Outcome: Ongoing, Progressing     Problem: Fall Injury Risk  Goal: Absence of Fall and Fall-Related Injury  Outcome: Ongoing, Progressing  Intervention: Identify and Manage Contributors  Flowsheets (Taken 7/31/2022 1725)  Self-Care Promotion: independence encouraged  Medication Review/Management:   medications reviewed   high-risk medications identified  Intervention: Promote Injury-Free Environment  Flowsheets (Taken 7/31/2022 1725)  Safety Promotion/Fall Prevention:   assistive device/personal item within reach   bed alarm set   instructed to call staff for mobility     Problem: Urinary Retention  Goal: Effective Urinary Elimination  Outcome: Ongoing, Progressing  Intervention: Promote Effective Urine Elimination  Flowsheets (Taken 7/31/2022 1725)  Urinary Elimination Promotion: catheter patency maintained

## 2022-07-31 NOTE — SUBJECTIVE & OBJECTIVE
Interval History:  Doing well, no complaints however has noticeable L eye blepharitis that he states causes no pain    Review of Systems   Constitutional:  Negative for fatigue and fever.   HENT:  Negative for congestion, ear pain, postnasal drip, sinus pain and sore throat.    Eyes:  Positive for redness (L lower lid). Negative for pain and visual disturbance.   Respiratory:  Negative for cough, shortness of breath and wheezing.    Cardiovascular:  Negative for chest pain, palpitations and leg swelling.   Gastrointestinal:  Negative for abdominal pain, diarrhea, nausea and vomiting.   Endocrine: Negative for cold intolerance and heat intolerance.   Musculoskeletal:  Negative for arthralgias, joint swelling and myalgias.   Skin:  Negative for rash and wound.   Neurological:  Negative for dizziness, weakness, numbness and headaches.   Objective:     Vital Signs (Most Recent):  Temp: 97.9 °F (36.6 °C) (07/31/22 1116)  Pulse: 65 (07/31/22 1116)  Resp: 18 (07/31/22 0313)  BP: 132/65 (07/31/22 1116)  SpO2: 97 % (07/31/22 1116)   Vital Signs (24h Range):  Temp:  [97.8 °F (36.6 °C)-98.2 °F (36.8 °C)] 97.9 °F (36.6 °C)  Pulse:  [65-85] 65  Resp:  [18-19] 18  SpO2:  [97 %-99 %] 97 %  BP: (123-146)/(63-74) 132/65     Weight: 93.6 kg (206 lb 5.6 oz)  Body mass index is 26.49 kg/m².    Intake/Output Summary (Last 24 hours) at 7/31/2022 1426  Last data filed at 7/31/2022 1300  Gross per 24 hour   Intake 840 ml   Output 1700 ml   Net -860 ml        Physical Exam  Constitutional:       General: He is not in acute distress.     Appearance: Normal appearance. He is obese.   HENT:      Head: Normocephalic and atraumatic.      Nose: No congestion or rhinorrhea.      Mouth/Throat:      Mouth: Mucous membranes are moist.   Eyes:      Conjunctiva/sclera: Conjunctivae normal.      Pupils: Pupils are equal, round, and reactive to light.     Cardiovascular:      Rate and Rhythm: Normal rate and regular rhythm.      Heart sounds: No murmur  heard.  Pulmonary:      Effort: Pulmonary effort is normal.      Breath sounds: Normal breath sounds. No wheezing.   Abdominal:      General: Bowel sounds are normal. There is no distension.      Palpations: Abdomen is soft.      Tenderness: There is no abdominal tenderness.   Musculoskeletal:         General: No swelling. Normal range of motion.      Cervical back: Normal range of motion and neck supple.      Right lower leg: No edema.      Left lower leg: No edema.   Skin:     General: Skin is warm and dry.      Findings: No rash.   Neurological:      General: No focal deficit present.      Mental Status: He is alert and oriented to person, place, and time.   Psychiatric:         Mood and Affect: Mood normal.         Behavior: Behavior normal.       Significant Labs: All pertinent labs within the past 24 hours have been reviewed.    Significant Imaging: I have reviewed all pertinent imaging results/findings within the past 24 hours.

## 2022-07-31 NOTE — PROGRESS NOTES
[facility]  [facility] Medicine  Progress Note    Patient Name: Adal Phoenix  MRN: [id number]  Patient Class: IP- Inpatient   Admission Date: 7/27/2022  Length of Stay: 2 days  Attending Physician: Thairy G Reyes, DO  Primary Care Provider: Fernandez Castillo MD        Subjective:     Principal Problem:COVID-19 virus infection        HPI:  81-year-old male with a past medical history of prior MI status post PCI, hypertension, hyperlipidemia who was transferred from [facility] as they had no beds for admission; he was diagnosed with COVID infection 5 days ago and since then he has had a headache, nausea and dizziness and states he just has not felt well; also with a cough and clear sputum; he was to be admitted to [place] but they had no beds and fell because his blood pressure showed orthostatic hypotension he should be admitted.  Patient also with significantly elevated LFTs compared to baseline a few months ago.  Right upper quadrant ultrasound unremarkable, suspicion for ischemic hepatitis.  During eval here pt states he presented with complaint of passing out. Patient reports for the last 2 weeks his knees have been giving out under him and he falls down however last night for the 1st time he does not recall events leading up to his fall.  Patient does affirm to living alone.  CT head without contrast showed chronic microvascular changes.  Chest x-ray showed no acute thoracic abnormality.      Overview/Hospital Course:  Patient admitted for syncopal episode and incidentally found to have elevated LFTs likely secondary to ischemic hepatitis from volume depletion secondary to COVID-19.  However while admitted at our facility patient has been noted to have significant orthostatic hypotension.  This is likely the source of patient's syncopal episodes outpatient.  Discontinued metoprolol, will monitor response.  As far as COVID-19 patient is asymptomatic and not requiring any supplemental  oxygen.  When patient arrived as a transfer he had Bee catheter in place for acute retention.  Bee catheter was removed and he required intermittent catheterization twice overnight.  Bee catheter will be replaced and patient will be discharged with a Bee catheter.      Interval History:  Doing well, no complaints however has noticeable L eye blepharitis that he states causes no pain    Review of Systems   Constitutional:  Negative for fatigue and fever.   HENT:  Negative for congestion, ear pain, postnasal drip, sinus pain and sore throat.    Eyes:  Positive for redness (L lower lid). Negative for pain and visual disturbance.   Respiratory:  Negative for cough, shortness of breath and wheezing.    Cardiovascular:  Negative for chest pain, palpitations and leg swelling.   Gastrointestinal:  Negative for abdominal pain, diarrhea, nausea and vomiting.   Endocrine: Negative for cold intolerance and heat intolerance.   Musculoskeletal:  Negative for arthralgias, joint swelling and myalgias.   Skin:  Negative for rash and wound.   Neurological:  Negative for dizziness, weakness, numbness and headaches.   Objective:     Vital Signs (Most Recent):  Temp: 97.9 °F (36.6 °C) (07/31/22 1116)  Pulse: 65 (07/31/22 1116)  Resp: 18 (07/31/22 0313)  BP: 132/65 (07/31/22 1116)  SpO2: 97 % (07/31/22 1116)   Vital Signs (24h Range):  Temp:  [97.8 °F (36.6 °C)-98.2 °F (36.8 °C)] 97.9 °F (36.6 °C)  Pulse:  [65-85] 65  Resp:  [18-19] 18  SpO2:  [97 %-99 %] 97 %  BP: (123-146)/(63-74) 132/65     Weight: 93.6 kg (206 lb 5.6 oz)  Body mass index is 26.49 kg/m².    Intake/Output Summary (Last 24 hours) at 7/31/2022 1426  Last data filed at 7/31/2022 1300  Gross per 24 hour   Intake 840 ml   Output 1700 ml   Net -860 ml        Physical Exam  Constitutional:       General: He is not in acute distress.     Appearance: Normal appearance. He is obese.   HENT:      Head: Normocephalic and atraumatic.      Nose: No congestion or rhinorrhea.       Mouth/Throat:      Mouth: Mucous membranes are moist.   Eyes:      Conjunctiva/sclera: Conjunctivae normal.      Pupils: Pupils are equal, round, and reactive to light.     Cardiovascular:      Rate and Rhythm: Normal rate and regular rhythm.      Heart sounds: No murmur heard.  Pulmonary:      Effort: Pulmonary effort is normal.      Breath sounds: Normal breath sounds. No wheezing.   Abdominal:      General: Bowel sounds are normal. There is no distension.      Palpations: Abdomen is soft.      Tenderness: There is no abdominal tenderness.   Musculoskeletal:         General: No swelling. Normal range of motion.      Cervical back: Normal range of motion and neck supple.      Right lower leg: No edema.      Left lower leg: No edema.   Skin:     General: Skin is warm and dry.      Findings: No rash.   Neurological:      General: No focal deficit present.      Mental Status: He is alert and oriented to person, place, and time.   Psychiatric:         Mood and Affect: Mood normal.         Behavior: Behavior normal.       Significant Labs: All pertinent labs within the past 24 hours have been reviewed.    Significant Imaging: I have reviewed all pertinent imaging results/findings within the past 24 hours.      Assessment/Plan:      * COVID-19 virus infection  -Conservative management, pt requiring no supplemental O2 at this time  -inflammatory markers mildly elevated  -Vit C, D, zinc  -out of isolation    Elevated liver enzymes  -downtrending  -suspect ischemic hepatitis 2/2 hypotension  -Right upper quadrant ultrasound showed hepatic steatosis, normal caliber common bile duct, negative Rivers's    Orthostatic hypotension  -discontinued metoprolol on 7/28      CHF (congestive heart failure)  -Patient has a history ischemic cardiomyopathy with an ejection fraction of 30% and grade II diastolic dysfunction per record review in 2019  -a Medtronic Evera MRI XT DR Defibrillator implanted. It a complete MR Conditional  system implanted, consisting of a SureScan device and Surescan leads. Implanted on dec 23, 2019. He sees Dr. Sathish Lockwood and Dr Mert Simmons at [facility]--> interrogated 714  -continue with Entresto, spironolactone, Lasix, ranolazine, discontinued metoprolol on 07/28 secondary to symptomatic orthostatic hypotension        Recurrent falls  -suspect secondary to orthostatic hypotension, metoprolol discontinued on 07/28; monitor response  - CT head showing chronic microvascular change  -PT/OT      Blepharitis of left lower eyelid  -start bacitracin t.i.d. for 10 days on 08/01/2022 as it is not in stock today  -warm compresses  -denies any blurry vision      Acute urinary retention  -discharge with Bee catheter        VTE Risk Mitigation (From admission, onward)         Ordered     Place LIVE hose  Until discontinued         07/28/22 1059     enoxaparin injection 40 mg  Daily         07/27/22 0033     IP VTE HIGH RISK PATIENT  Once         07/27/22 0033                Discharge Planning   AUSTYN:      Code Status: Full Code   Is the patient medically ready for discharge?:     Reason for patient still in hospital (select all that apply): Treatment and PT / OT recommendations  Discharge Plan A: Home Health, Home with family   Discharge Delays: None known at this time              Thairy G Reyes, DO  Department of Hospital Medicine   [facility]

## 2022-07-31 NOTE — ASSESSMENT & PLAN NOTE
-start bacitracin t.i.d. for 10 days on 08/01/2022 as it is not in stock today  -warm compresses  -denies any blurry vision

## 2022-07-31 NOTE — PLAN OF CARE
Problem: Adult Inpatient Plan of Care  Goal: Plan of Care Review  Outcome: Ongoing, Progressing  Flowsheets (Taken 7/30/2022 2145)  Plan of Care Reviewed With:   patient   daughter  Goal: Absence of Hospital-Acquired Illness or Injury  Outcome: Ongoing, Progressing  Intervention: Identify and Manage Fall Risk  Flowsheets (Taken 7/30/2022 2145)  Safety Promotion/Fall Prevention:   assistive device/personal item within reach   bed alarm set   Fall Risk reviewed with patient/family   gait belt with ambulation   nonskid shoes/socks when out of bed   muscle strengthening facilitated   side rails raised x 3   Supervised toileting - stay within arms reach   supervised activity   instructed to call staff for mobility  Intervention: Prevent Skin Injury  Flowsheets (Taken 7/30/2022 2145)  Body Position: position changed independently  Skin Protection: incontinence pads utilized  Intervention: Prevent and Manage VTE (Venous Thromboembolism) Risk  Flowsheets (Taken 7/30/2022 2145)  Activity Management: Walk with assistive devise and /or staff member - L3  VTE Prevention/Management: ambulation promoted  Intervention: Prevent Infection  Flowsheets (Taken 7/30/2022 2145)  Infection Prevention:   hand hygiene promoted   rest/sleep promoted     Problem: Fall Injury Risk  Goal: Absence of Fall and Fall-Related Injury  Outcome: Ongoing, Progressing  Intervention: Identify and Manage Contributors  Flowsheets (Taken 7/30/2022 2145)  Self-Care Promotion:   independence encouraged   safe use of adaptive equipment encouraged  Intervention: Promote Injury-Free Environment  Flowsheets (Taken 7/30/2022 2145)  Safety Promotion/Fall Prevention:   assistive device/personal item within reach   bed alarm set   Fall Risk reviewed with patient/family   gait belt with ambulation   nonskid shoes/socks when out of bed   muscle strengthening facilitated   side rails raised x 3   Supervised toileting - stay within arms reach   supervised activity    instructed to call staff for mobility     Problem: Urinary Retention  Goal: Effective Urinary Elimination  Outcome: Ongoing, Progressing  Intervention: Promote Effective Urine Elimination  Flowsheets (Taken 7/30/2022 2145)  Urinary Elimination Promotion: catheter patency maintained

## 2022-07-31 NOTE — ASSESSMENT & PLAN NOTE
-Patient has a history ischemic cardiomyopathy with an ejection fraction of 30% and grade II diastolic dysfunction per record review in 2019  -a Medtronic Evera MRI XT DR Defibrillator implanted. It a complete MR Conditional system implanted, consisting of a SureScan device and Surescan leads. Implanted on dec 23, 2019. He sees Dr. Sathish Lockwood and Dr Mert Simmons at [facility]--> interrogated 714  -continue with Entresto, spironolactone, Lasix, ranolazine, discontinued metoprolol on 07/28 secondary to symptomatic orthostatic hypotension

## 2022-08-01 PROCEDURE — 63600175 PHARM REV CODE 636 W HCPCS: Performed by: STUDENT IN AN ORGANIZED HEALTH CARE EDUCATION/TRAINING PROGRAM

## 2022-08-01 PROCEDURE — 25000003 PHARM REV CODE 250: Performed by: STUDENT IN AN ORGANIZED HEALTH CARE EDUCATION/TRAINING PROGRAM

## 2022-08-01 PROCEDURE — 97530 THERAPEUTIC ACTIVITIES: CPT

## 2022-08-01 PROCEDURE — 97116 GAIT TRAINING THERAPY: CPT

## 2022-08-01 PROCEDURE — 11000001 HC ACUTE MED/SURG PRIVATE ROOM

## 2022-08-01 RX ORDER — FUROSEMIDE 20 MG/1
20 TABLET ORAL DAILY
Status: CANCELLED | OUTPATIENT
Start: 2022-08-01

## 2022-08-01 RX ORDER — RANOLAZINE 500 MG/1
500 TABLET, EXTENDED RELEASE ORAL 2 TIMES DAILY
Status: CANCELLED | OUTPATIENT
Start: 2022-08-01

## 2022-08-01 RX ORDER — AMIODARONE HYDROCHLORIDE 200 MG/1
200 TABLET ORAL 2 TIMES DAILY
Status: CANCELLED | OUTPATIENT
Start: 2022-08-01

## 2022-08-01 RX ORDER — ASCORBIC ACID 500 MG
500 TABLET ORAL DAILY
Qty: 30 TABLET | Refills: 0 | Status: SHIPPED | OUTPATIENT
Start: 2022-08-02 | End: 2023-08-29

## 2022-08-01 RX ORDER — CHOLECALCIFEROL (VITAMIN D3) 25 MCG
1000 TABLET ORAL DAILY
Qty: 30 TABLET | Refills: 0 | Status: SHIPPED | OUTPATIENT
Start: 2022-08-02 | End: 2023-08-29

## 2022-08-01 RX ORDER — ERYTHROMYCIN 5 MG/G
OINTMENT OPHTHALMIC 3 TIMES DAILY
Status: DISCONTINUED | OUTPATIENT
Start: 2022-08-01 | End: 2022-08-02 | Stop reason: HOSPADM

## 2022-08-01 RX ORDER — ASCORBIC ACID 500 MG
500 TABLET ORAL DAILY
Status: CANCELLED | OUTPATIENT
Start: 2022-08-01

## 2022-08-01 RX ORDER — ENOXAPARIN SODIUM 100 MG/ML
40 INJECTION SUBCUTANEOUS EVERY 24 HOURS
Status: CANCELLED | OUTPATIENT
Start: 2022-08-01

## 2022-08-01 RX ORDER — ZINC SULFATE 50(220)MG
220 CAPSULE ORAL DAILY
Qty: 30 CAPSULE | Refills: 0 | Status: SHIPPED | OUTPATIENT
Start: 2022-08-02 | End: 2023-08-29

## 2022-08-01 RX ORDER — ALBUTEROL SULFATE 90 UG/1
4 AEROSOL, METERED RESPIRATORY (INHALATION) EVERY 6 HOURS PRN
Status: CANCELLED | OUTPATIENT
Start: 2022-08-01

## 2022-08-01 RX ORDER — NAPROXEN SODIUM 220 MG/1
81 TABLET, FILM COATED ORAL DAILY
Status: CANCELLED | OUTPATIENT
Start: 2022-08-01

## 2022-08-01 RX ORDER — SODIUM CHLORIDE 0.9 % (FLUSH) 0.9 %
2 SYRINGE (ML) INJECTION EVERY 6 HOURS PRN
Status: CANCELLED | OUTPATIENT
Start: 2022-08-01

## 2022-08-01 RX ORDER — ATORVASTATIN CALCIUM 40 MG/1
40 TABLET, FILM COATED ORAL NIGHTLY
Status: CANCELLED | OUTPATIENT
Start: 2022-08-01

## 2022-08-01 RX ORDER — CHOLECALCIFEROL (VITAMIN D3) 25 MCG
1000 TABLET ORAL DAILY
Status: CANCELLED | OUTPATIENT
Start: 2022-08-01

## 2022-08-01 RX ORDER — ACETAMINOPHEN 325 MG/1
650 TABLET ORAL EVERY 4 HOURS PRN
Status: CANCELLED | OUTPATIENT
Start: 2022-08-01

## 2022-08-01 RX ORDER — TALC
9 POWDER (GRAM) TOPICAL NIGHTLY PRN
Status: CANCELLED | OUTPATIENT
Start: 2022-08-01

## 2022-08-01 RX ORDER — ONDANSETRON 4 MG/1
8 TABLET, ORALLY DISINTEGRATING ORAL EVERY 8 HOURS PRN
Status: CANCELLED | OUTPATIENT
Start: 2022-08-01

## 2022-08-01 RX ORDER — ZINC SULFATE 50(220)MG
220 CAPSULE ORAL DAILY
Status: CANCELLED | OUTPATIENT
Start: 2022-08-01

## 2022-08-01 RX ORDER — HYDROCODONE BITARTRATE AND ACETAMINOPHEN 5; 325 MG/1; MG/1
1 TABLET ORAL EVERY 6 HOURS PRN
Status: CANCELLED | OUTPATIENT
Start: 2022-08-01

## 2022-08-01 RX ORDER — POLYETHYLENE GLYCOL 3350 17 G/17G
17 POWDER, FOR SOLUTION ORAL 3 TIMES DAILY PRN
Status: CANCELLED | OUTPATIENT
Start: 2022-08-01

## 2022-08-01 RX ORDER — ERYTHROMYCIN 5 MG/G
OINTMENT OPHTHALMIC 3 TIMES DAILY
Qty: 1 G | Refills: 0 | Status: SHIPPED | OUTPATIENT
Start: 2022-08-01 | End: 2023-08-29

## 2022-08-01 RX ORDER — BACITRACIN 500 [USP'U]/G
OINTMENT OPHTHALMIC 3 TIMES DAILY
Status: CANCELLED | OUTPATIENT
Start: 2022-08-01 | End: 2022-08-11

## 2022-08-01 RX ORDER — LEVOTHYROXINE SODIUM 125 UG/1
125 TABLET ORAL DAILY
Status: CANCELLED | OUTPATIENT
Start: 2022-08-01

## 2022-08-01 RX ORDER — AMOXICILLIN 250 MG
1 CAPSULE ORAL 2 TIMES DAILY
Status: CANCELLED | OUTPATIENT
Start: 2022-08-01

## 2022-08-01 RX ORDER — TAMSULOSIN HYDROCHLORIDE 0.4 MG/1
0.4 CAPSULE ORAL DAILY
Status: CANCELLED | OUTPATIENT
Start: 2022-08-01

## 2022-08-01 RX ORDER — FAMOTIDINE 20 MG/1
20 TABLET, FILM COATED ORAL 2 TIMES DAILY
Status: CANCELLED | OUTPATIENT
Start: 2022-08-01

## 2022-08-01 RX ADMIN — MELATONIN TAB 3 MG 9 MG: 3 TAB at 08:08

## 2022-08-01 RX ADMIN — FUROSEMIDE 20 MG: 20 TABLET ORAL at 09:08

## 2022-08-01 RX ADMIN — RANOLAZINE 500 MG: 500 TABLET, EXTENDED RELEASE ORAL at 09:08

## 2022-08-01 RX ADMIN — AMIODARONE HYDROCHLORIDE 200 MG: 200 TABLET ORAL at 08:08

## 2022-08-01 RX ADMIN — TAMSULOSIN HYDROCHLORIDE 0.4 MG: 0.4 CAPSULE ORAL at 09:08

## 2022-08-01 RX ADMIN — SACUBITRIL AND VALSARTAN 1 TABLET: 24; 26 TABLET, FILM COATED ORAL at 09:08

## 2022-08-01 RX ADMIN — SENNOSIDES, DOCUSATE SODIUM 1 TABLET: 8.6; 5 TABLET ORAL at 09:08

## 2022-08-01 RX ADMIN — ENOXAPARIN SODIUM 40 MG: 100 INJECTION SUBCUTANEOUS at 06:08

## 2022-08-01 RX ADMIN — AMIODARONE HYDROCHLORIDE 200 MG: 200 TABLET ORAL at 09:08

## 2022-08-01 RX ADMIN — ASPIRIN 81 MG CHEWABLE TABLET 81 MG: 81 TABLET CHEWABLE at 06:08

## 2022-08-01 RX ADMIN — ERYTHROMYCIN: 5 OINTMENT OPHTHALMIC at 08:08

## 2022-08-01 RX ADMIN — ZINC SULFATE 220 MG (50 MG) CAPSULE 220 MG: CAPSULE at 09:08

## 2022-08-01 RX ADMIN — LEVOTHYROXINE SODIUM 125 MCG: 125 TABLET ORAL at 06:08

## 2022-08-01 RX ADMIN — ERYTHROMYCIN: 5 OINTMENT OPHTHALMIC at 03:08

## 2022-08-01 RX ADMIN — FAMOTIDINE 20 MG: 20 TABLET ORAL at 08:08

## 2022-08-01 RX ADMIN — SACUBITRIL AND VALSARTAN 1 TABLET: 24; 26 TABLET, FILM COATED ORAL at 08:08

## 2022-08-01 RX ADMIN — SENNOSIDES, DOCUSATE SODIUM 1 TABLET: 8.6; 5 TABLET ORAL at 08:08

## 2022-08-01 RX ADMIN — RANOLAZINE 500 MG: 500 TABLET, EXTENDED RELEASE ORAL at 08:08

## 2022-08-01 RX ADMIN — ATORVASTATIN CALCIUM 40 MG: 40 TABLET, FILM COATED ORAL at 08:08

## 2022-08-01 RX ADMIN — SPIRONOLACTONE 12.5 MG: 25 TABLET, FILM COATED ORAL at 06:08

## 2022-08-01 RX ADMIN — OXYCODONE HYDROCHLORIDE AND ACETAMINOPHEN 500 MG: 500 TABLET ORAL at 09:08

## 2022-08-01 RX ADMIN — Medication 1000 UNITS: at 09:08

## 2022-08-01 NOTE — PLAN OF CARE
[facility]  Discharge Reassessment    Primary Care Provider: Fernandez Castillo MD    Expected Discharge Date: 8/1/2022    Reassessment (most recent)     Discharge Reassessment - 08/01/22 1209        Discharge Reassessment    Assessment Type Discharge Planning Reassessment     Did the patient's condition or plan change since previous assessment? No     Discharge Plan discussed with: Adult children     Communicated AUSTYN with patient/caregiver Yes     Discharge Plan A New Nursing Home placement - shelter care facility     DME Needed Upon Discharge  none     Discharge Barriers Identified None        Post-Acute Status    Post-Acute Authorization Placement     Post-Acute Placement Status Set-up Complete/Auth obtained     Home Health Status Set-up Complete/Auth obtained     Hospital Resources/Appts/Education Provided Provided patient/caregiver with written discharge plan information     Discharge Delays None known at this time

## 2022-08-01 NOTE — PLAN OF CARE
Problem: Adult Inpatient Plan of Care  Goal: Plan of Care Review  Outcome: Ongoing, Progressing  Flowsheets (Taken 8/1/2022 1836)  Plan of Care Reviewed With: patient  Goal: Absence of Hospital-Acquired Illness or Injury  Outcome: Ongoing, Progressing  Intervention: Identify and Manage Fall Risk  Flowsheets (Taken 8/1/2022 1836)  Safety Promotion/Fall Prevention:   assistive device/personal item within reach   chair alarm set   in recliner, wheels locked   instructed to call staff for mobility  Intervention: Prevent Skin Injury  Flowsheets (Taken 8/1/2022 1836)  Body Position: position changed independently  Skin Protection: incontinence pads utilized  Intervention: Prevent and Manage VTE (Venous Thromboembolism) Risk  Flowsheets (Taken 8/1/2022 1836)  Activity Management: Up in chair - L3  VTE Prevention/Management:   fluids promoted   ambulation promoted   remove, assess skin, and reapply compression stockings  Intervention: Prevent Infection  Flowsheets (Taken 8/1/2022 1836)  Infection Prevention:   rest/sleep promoted   hand hygiene promoted   equipment surfaces disinfected   environmental surveillance performed   cohorting utilized     Problem: Infection  Goal: Absence of Infection Signs and Symptoms  Outcome: Ongoing, Progressing  Intervention: Prevent or Manage Infection  Flowsheets (Taken 8/1/2022 1836)  Isolation Precautions: precautions maintained     Problem: Fall Injury Risk  Goal: Absence of Fall and Fall-Related Injury  Outcome: Ongoing, Progressing  Intervention: Identify and Manage Contributors  Flowsheets (Taken 8/1/2022 1836)  Self-Care Promotion:   independence encouraged   BADL personal objects within reach   BADL personal routines maintained   meal set-up provided   safe use of adaptive equipment encouraged  Medication Review/Management: medications reviewed  Intervention: Promote Injury-Free Environment  Flowsheets (Taken 8/1/2022 1836)  Safety Promotion/Fall Prevention:   assistive  device/personal item within reach   chair alarm set   in recliner, wheels locked   instructed to call staff for mobility     Problem: Urinary Retention  Goal: Effective Urinary Elimination  Outcome: Ongoing, Progressing  Intervention: Promote Effective Urine Elimination  Flowsheets (Taken 8/1/2022 6192)  Urinary Elimination Promotion: catheter patency maintained

## 2022-08-01 NOTE — PROGRESS NOTES
Name: Adal Phoenix    : 1940 (81 y.o.)  MRN: [id number]           Patient Unavailable      Patient unable to be seen at this time secondary to: only agreeable to ambulate at this time - just finished walking with OT at this time.. Will check back later this PM

## 2022-08-01 NOTE — PROGRESS NOTES
Name: Adal Phoenix    : 1940 (81 y.o.)  MRN: [id number]           Patient Unavailable      Patient unable to be seen at this time secondary to: Pt agreeable to only ambulation at this time. OT notified PT Partha to complete endurance training this PM. Continue with POC tomorrow.

## 2022-08-01 NOTE — PT/OT/SLP PROGRESS
Physical Therapy Treatment Note           Name: Adal Phoenix    : 1940 (81 y.o.)  MRN: [id number]           TREATMENT SUMMARY AND RECOMMENDATIONS:    PT Received On: 22  PT Start Time: 1506     PT Stop Time: 1516  PT Total Time (min): 10 min     Subjective Assessment:   No complaints  Lethargic   x Awake, alert, cooperative  Uncooperative    Agitated  c/o pain    Appropriate  c/o fatigue    Confused  Treated at bedside     Emotionally labile  Treated in gym/dept.    Impulsive  Other:    Flat affect       Therapy Precautions:    Cognitive deficits  Spinal precautions    Collar - hard  Sternal precautions    Collar - soft   TLSO    Fall risk  LSO    Hip precautions - posterior  Knee immobilizer    Hip precautions - anterior  WBAT    Impaired communication  Partial weightbearing    Oxygen  TTWB    PEG tube  NWB    Visual deficits  Other:    Hearing deficits          Treatment Objectives:     Mobility Training:   Assist level Comments    Bed mobility Mod I Supine > sit   Transfer     Gait SBA x670ft using RW   Sit to stand transitions SBA    Sitting balance     Standing balance      Wheelchair mobility     Car transfer     Other:          Therapeutic Exercise:   Exercise Sets Reps Comments                               Additional Comments:      Assessment: Patient tolerated session well. Only agreeable to gait at this time. Increase in endurance noted.    PT Plan: continue POC  Revisions made to plan of care: No    GOALS:   Multidisciplinary Problems     Physical Therapy Goals        Problem: Physical Therapy    Goal Priority Disciplines Outcome Goal Variances Interventions   Physical Therapy Goal     PT, PT/OT Ongoing, Progressing     Description: Goals to be met by: Discharge     Patient will increase functional independence with mobility by performin. Supine to sit with Modified [place]  2. Sit to supine with Modified [place]  3. Sit to stand transfer with Modified [place]  4.  Bed to chair transfer with Modified [place] using Rolling Walker vs LRAD  5. Gait  x 200 feet with Modified [place] using Rolling Walker vs LRAD.   6. Ascend/descend 5 stair with left Handrails Stand-by Assistance using No Assistive Device.                      Skilled PT Minutes Provided: 10 minutes   Communication with Treatment Team:     Equipment recommendations:       At end of treatment, patient remained:  x Up in chair     Up in wheelchair in room    In bed   x With alarm activated    Bed rails up   x Call bell in reach     Family/friends present    Restraints secured properly    In bathroom with CNA/RN notified    Nurse aware    In gym with therapist/tech    Other:

## 2022-08-01 NOTE — NURSING
Spoke with nurse at [facility]. Stated NH will need to f/u with them when patient is admitted to NH to start a voiding trial.

## 2022-08-01 NOTE — ASSESSMENT & PLAN NOTE
-Patient has a history ischemic cardiomyopathy with an ejection fraction of 30% and grade II diastolic dysfunction per record review in 2019  -a Medtronic Evera MRI XT DR Defibrillator implanted. It a complete MR Conditional system implanted, consisting of a SureScan device and Surescan leads. Implanted on dec 23, 2019. He sees Dr. Sathish Lockwood and Dr Mert Simmons at [facility]--> interrogated 714  -continue with Entresto, spironolactone, Lasix, ranolazine, discontinued metoprolol on 07/28 secondary to symptomatic orthostatic hypotension

## 2022-08-01 NOTE — PROGRESS NOTES
[facility]  [facility] Medicine  Progress Note    Patient Name: Adal Phoenix  MRN: [id number]  Patient Class: IP- Inpatient   Admission Date: 7/27/2022  Length of Stay: 3 days  Attending Physician: Thairy G Reyes, DO  Primary Care Provider: Fernandez Castillo MD        Subjective:     Principal Problem:COVID-19 virus infection        HPI:  81-year-old male with a past medical history of prior MI status post PCI, hypertension, hyperlipidemia who was transferred from [facility] as they had no beds for admission; he was diagnosed with COVID infection 5 days ago and since then he has had a headache, nausea and dizziness and states he just has not felt well; also with a cough and clear sputum; he was to be admitted to [place] but they had no beds and fell because his blood pressure showed orthostatic hypotension he should be admitted.  Patient also with significantly elevated LFTs compared to baseline a few months ago.  Right upper quadrant ultrasound unremarkable, suspicion for ischemic hepatitis.  During eval here pt states he presented with complaint of passing out. Patient reports for the last 2 weeks his knees have been giving out under him and he falls down however last night for the 1st time he does not recall events leading up to his fall.  Patient does affirm to living alone.  CT head without contrast showed chronic microvascular changes.  Chest x-ray showed no acute thoracic abnormality.      Overview/Hospital Course:  Patient admitted for syncopal episode and incidentally found to have elevated LFTs likely secondary to ischemic hepatitis from volume depletion secondary to COVID-19.  However while admitted at our facility patient has been noted to have significant orthostatic hypotension.  This is likely the source of patient's syncopal episodes outpatient.  Discontinued metoprolol, will monitor response.  As far as COVID-19 patient is asymptomatic and not requiring any supplemental  oxygen.  When patient arrived as a transfer he had Bee catheter in place for acute retention.  Bee catheter was removed and he required intermittent catheterization twice overnight.  Bee catheter will be replaced and patient will be discharged with a Bee catheter.  Patient with left eye blepharitis, bacitracin ointment on back order.  Utilizing warm compress in the meantime.  DC to swing bed 08/01/2022.      No new subjective & objective note has been filed under this hospital service since the last note was generated.  Doing well, no complaints however has noticeable L eye blepharitis that he states causes no pain    Review of Systems   Constitutional:  Negative for fatigue and fever.   HENT:  Negative for congestion, ear pain, postnasal drip, sinus pain and sore throat.    Eyes:  Positive for redness (L lower lid). Negative for pain and visual disturbance.   Respiratory:  Negative for cough, shortness of breath and wheezing.    Cardiovascular:  Negative for chest pain, palpitations and leg swelling.   Gastrointestinal:  Negative for abdominal pain, diarrhea, nausea and vomiting.   Endocrine: Negative for cold intolerance and heat intolerance.   Musculoskeletal:  Negative for arthralgias, joint swelling and myalgias.   Skin:  Negative for rash and wound.   Neurological:  Negative for dizziness, weakness, numbness and headaches.   Objective:     Vital Signs (Most Recent):  Temp: 98 °F (36.7 °C) (08/01/22 0745)  Pulse: 63 (08/01/22 0745)  Resp: 18 (08/01/22 0745)  BP: (!) 144/74 (08/01/22 0934)  SpO2: 96 % (08/01/22 0745)   Vital Signs (24h Range):  Temp:  [97.7 °F (36.5 °C)-98.3 °F (36.8 °C)] 98 °F (36.7 °C)  Pulse:  [63-78] 63  Resp:  [17-18] 18  SpO2:  [96 %-98 %] 96 %  BP: (124-150)/(61-74) 144/74     Weight: 100.3 kg (221 lb 1.9 oz)  Body mass index is 28.39 kg/m².    Intake/Output Summary (Last 24 hours) at 8/1/2022 1129  Last data filed at 8/1/2022 0800  Gross per 24 hour   Intake 1440 ml   Output 2050  ml   Net -610 ml        Physical Exam  Constitutional:       General: He is not in acute distress.     Appearance: Normal appearance. He is obese.   HENT:      Head: Normocephalic and atraumatic.      Nose: No congestion or rhinorrhea.      Mouth/Throat:      Mouth: Mucous membranes are moist.   Eyes:      Conjunctiva/sclera: Conjunctivae normal.      Pupils: Pupils are equal, round, and reactive to light.     Cardiovascular:      Rate and Rhythm: Normal rate and regular rhythm.      Heart sounds: No murmur heard.  Pulmonary:      Effort: Pulmonary effort is normal.      Breath sounds: Normal breath sounds. No wheezing.   Abdominal:      General: Bowel sounds are normal. There is no distension.      Palpations: Abdomen is soft.      Tenderness: There is no abdominal tenderness.   Musculoskeletal:         General: No swelling. Normal range of motion.      Cervical back: Normal range of motion and neck supple.      Right lower leg: No edema.      Left lower leg: No edema.   Skin:     General: Skin is warm and dry.      Findings: No rash.   Neurological:      General: No focal deficit present.      Mental Status: He is alert and oriented to person, place, and time.   Psychiatric:         Mood and Affect: Mood normal.         Behavior: Behavior normal.       Significant Labs: All pertinent labs within the past 24 hours have been reviewed.    Significant Imaging: I have reviewed all pertinent imaging results/findings within the past 24 hours.    Assessment/Plan:      * COVID-19 virus infection  -Conservative management, pt requiring no supplemental O2 at this time  -inflammatory markers mildly elevated  -Vit C, D, zinc  -out of isolation    Elevated liver enzymes  -downtrending  -suspect ischemic hepatitis 2/2 hypotension  -Right upper quadrant ultrasound showed hepatic steatosis, normal caliber common bile duct, negative Rivers's    Orthostatic hypotension  -discontinued metoprolol on 7/28      CHF (congestive heart  failure)  -Patient has a history ischemic cardiomyopathy with an ejection fraction of 30% and grade II diastolic dysfunction per record review in 2019  -a Medtronic Evera MRI XT DR Defibrillator implanted. It a complete MR Conditional system implanted, consisting of a SureScan device and Surescan leads. Implanted on dec 23, 2019. He sees Dr. Sathish Lockwood and Dr Mert Simmons at [facility]--> interrogated 714  -continue with Entresto, spironolactone, Lasix, ranolazine, discontinued metoprolol on 07/28 secondary to symptomatic orthostatic hypotension        Recurrent falls  -suspect secondary to orthostatic hypotension, metoprolol discontinued on 07/28; monitor response  - CT head showing chronic microvascular change  -PT/OT      Blepharitis of left lower eyelid  -start erythromycin t.i.d. for 10 days on 08/01/2022 as it is not in stock today  -warm compresses  -denies any blurry vision      Acute urinary retention  -discharge with Bee catheter        VTE Risk Mitigation (From admission, onward)         Ordered     Place ILVE hose  Until discontinued         07/28/22 1059     enoxaparin injection 40 mg  Daily         07/27/22 0033     IP VTE HIGH RISK PATIENT  Once         07/27/22 0033                Discharge Planning   AUSTYN: 8/1/2022     Code Status: Full Code   Is the patient medically ready for discharge?:     Reason for patient still in hospital (select all that apply): Treatment and Pending disposition  Discharge Plan A: Home Health, Home with family   Discharge Delays: None known at this time              Thairy G Reyes, DO  Department of Hospital Medicine   [facility]

## 2022-08-01 NOTE — DISCHARGE SUMMARY
[facility]  Hospital Medicine  Discharge Summary      Patient Name: Adal Phoenix  MRN: [id number]  Patient Class: IP- Inpatient  Admission Date: 7/27/2022  Hospital Length of Stay: 3 days  Discharge Date and Time:  08/01/2022 6:48 AM  Attending Physician: Thairy G Reyes, DO   Discharging Provider: Thairy G Reyes, DO  Primary Care Provider: Fernandez Castillo MD      HPI:   81-year-old male with a past medical history of prior MI status post PCI, hypertension, hyperlipidemia who was transferred from [facility] as they had no beds for admission; he was diagnosed with COVID infection 5 days ago and since then he has had a headache, nausea and dizziness and states he just has not felt well; also with a cough and clear sputum; he was to be admitted to [place] but they had no beds and fell because his blood pressure showed orthostatic hypotension he should be admitted.  Patient also with significantly elevated LFTs compared to baseline a few months ago.  Right upper quadrant ultrasound unremarkable, suspicion for ischemic hepatitis.  During eval here pt states he presented with complaint of passing out. Patient reports for the last 2 weeks his knees have been giving out under him and he falls down however last night for the 1st time he does not recall events leading up to his fall.  Patient does affirm to living alone.  CT head without contrast showed chronic microvascular changes.  Chest x-ray showed no acute thoracic abnormality.      * No surgery found *      Hospital Course:   Patient admitted for syncopal episode and incidentally found to have elevated LFTs likely secondary to ischemic hepatitis from volume depletion secondary to COVID-19.  However while admitted at our facility patient has been noted to have significant orthostatic hypotension.  This is likely the source of patient's syncopal episodes outpatient.  Discontinued metoprolol, will monitor response.  As far as COVID-19  patient is asymptomatic and not requiring any supplemental oxygen.  When patient arrived as a transfer he had Bee catheter in place for acute retention.  Bee catheter was removed and he required intermittent catheterization twice overnight.  Bee catheter will be replaced and patient will be discharged with a Bee catheter.  Patient with left eye blepharitis, bacitracin ointment on back order.  Utilizing warm compress in the meantime.  DC to swing bed 08/01/2022.       Goals of Care Treatment Preferences:  Code Status: Full Code      Consults:     Elevated liver enzymes  -downtrending  -suspect ischemic hepatitis 2/2 hypotension  -Right upper quadrant ultrasound showed hepatic steatosis, normal caliber common bile duct, negative Rivers's    Orthostatic hypotension  -discontinued metoprolol on 7/28      CHF (congestive heart failure)  -Patient has a history ischemic cardiomyopathy with an ejection fraction of 30% and grade II diastolic dysfunction per record review in 2019  -a [employer] MRI XT DR Defibrillator implanted. It a complete MR Conditional system implanted, consisting of a SureScan device and Surescan leads. Implanted on dec 23, 2019. He sees Dr. Sathish Lockwood and Dr Mert Simmons at [facility]--> interrogated 714  -continue with Entresto, spironolactone, Lasix, ranolazine, discontinued metoprolol on 07/28 secondary to symptomatic orthostatic hypotension        Recurrent falls  -suspect secondary to orthostatic hypotension, metoprolol discontinued on 07/28; monitor response  - CT head showing chronic microvascular change  -PT/OT      Blepharitis of left lower eyelid  -start bacitracin t.i.d. for 10 days on 08/01/2022 as it is not in stock today  -warm compresses  -denies any blurry vision      Acute urinary retention  -discharge with Bee catheter        Final Active Diagnoses:    Diagnosis Date Noted POA    PRINCIPAL PROBLEM:  COVID-19 virus infection [U07.1] 07/27/2022 Yes    Elevated liver enzymes [R74.8]  07/27/2022 Yes    Orthostatic hypotension [I95.1] 07/27/2022 Yes    CHF (congestive heart failure) [I50.9]  Yes    Recurrent falls [R29.6] 07/27/2022 Not Applicable    Blepharitis of left lower eyelid [H01.005] 07/31/2022 Yes    Acute urinary retention [R33.8] 07/28/2022 Yes      Problems Resolved During this Admission:       Discharged Condition: stable    Disposition: Skilled Nursing Facility    Follow Up:    Patient Instructions:   No discharge procedures on file.    Significant Diagnostic Studies: Labs: BMP: No results for input(s): GLU, NA, K, CL, CO2, BUN, CREATININE, CALCIUM, MG in the last 48 hours.    Pending Diagnostic Studies:     None         Medications:  Transfer Medications (for Discharge Readmit only):   Current Facility-Administered Medications   Medication Dose Route Frequency Provider Last Rate Last Admin    acetaminophen tablet 650 mg  650 mg Oral Q4H PRN Leslie G Reyes, DO        albuterol inhaler 4 puff  4 puff Inhalation Q6H PRN Leslie G Reyes, DO        amiodarone tablet 200 mg  200 mg Oral BID Thairy G Reyes, DO   200 mg at 07/31/22 2041    ascorbic acid (vitamin C) tablet 500 mg  500 mg Oral Daily Thairy G Reyes, DO   500 mg at 07/31/22 0848    aspirin chewable tablet 81 mg  81 mg Oral Daily Thairy G Reyes, DO   81 mg at 07/31/22 1608    atorvastatin tablet 40 mg  40 mg Oral Nightly Chenchory G Reyes, DO   40 mg at 07/31/22 2041    bacitracin ophthalmic ointment   Left Eye TID Leslie G Reyes, DO        enoxaparin injection 40 mg  40 mg Subcutaneous Daily Thairy G Reyes, DO   40 mg at 07/31/22 1608    famotidine tablet 20 mg  20 mg Oral BID Thairy G Reyes, DO   20 mg at 07/31/22 2041    furosemide tablet 20 mg  20 mg Oral Daily Thairy G Reyes, DO   20 mg at 07/31/22 0847    HYDROcodone-acetaminophen 5-325 mg per tablet 1 tablet  1 tablet Oral Q6H PRN Leslie G Reyes, DO        levothyroxine tablet 125 mcg  125 mcg Oral Daily Thairy G Reyes, DO   125 mcg at 07/31/22 1608     melatonin tablet 9 mg  9 mg Oral Nightly PRN Thairy G Reyes, DO   9 mg at 07/31/22 2041    ondansetron disintegrating tablet 8 mg  8 mg Oral Q8H PRN Thairy G Reyes, DO        polyethylene glycol packet 17 g  17 g Oral TID PRN Thairy G Reyes, DO        ranolazine 12 hr tablet 500 mg  500 mg Oral BID Leslie G Reyes, DO   500 mg at 07/31/22 2041    sacubitriL-valsartan 24-26 mg per tablet 1 tablet  1 tablet Oral BID Leslie G Reyes, DO   1 tablet at 07/31/22 0848    senna-docusate 8.6-50 mg per tablet 1 tablet  1 tablet Oral BID Thairy G Reyes, DO   1 tablet at 07/31/22 2041    sodium chloride 0.9% flush 2 mL  2 mL Intravenous Q6H PRN Thairy G Reyes, DO        spironolactone split tablet 12.5 mg  12.5 mg Oral Daily Leslie G Reyes, DO   12.5 mg at 07/31/22 1608    tamsulosin 24 hr capsule 0.4 mg  0.4 mg Oral Daily Chenchory G Reyes, DO   0.4 mg at 07/31/22 0848    vitamin D 1000 units tablet 1,000 Units  1,000 Units Oral Daily Leslie G Reyes, DO   1,000 Units at 07/31/22 0847    zinc sulfate capsule 220 mg  220 mg Oral Daily Chenchory G Reyes, DO   220 mg at 07/31/22 0847       Indwelling Lines/Drains at time of discharge:   Lines/Drains/Airways     Drain  Duration                Urethral Catheter 07/28/22 1620 Straight-tip 16 Fr. 3 days                Time spent on the discharge of patient: 40 minutes         Thairy G Reyes, DO  Department of Hospital Medicine  [facility]

## 2022-08-01 NOTE — PLAN OF CARE
Problem: Adult Inpatient Plan of Care  Goal: Plan of Care Review  Outcome: Ongoing, Progressing  Flowsheets (Taken 7/31/2022 2320)  Plan of Care Reviewed With: patient  Goal: Absence of Hospital-Acquired Illness or Injury  Outcome: Ongoing, Progressing  Intervention: Identify and Manage Fall Risk  Flowsheets (Taken 7/31/2022 2320)  Safety Promotion/Fall Prevention:   assistive device/personal item within reach   bed alarm set   Fall Risk reviewed with patient/family   muscle strengthening facilitated   nonskid shoes/socks when out of bed   side rails raised x 3   supervised activity   Supervised toileting - stay within arms reach   instructed to call staff for mobility  Intervention: Prevent Skin Injury  Flowsheets (Taken 7/31/2022 2320)  Body Position: position changed independently  Skin Protection: incontinence pads utilized  Intervention: Prevent and Manage VTE (Venous Thromboembolism) Risk  Flowsheets (Taken 7/31/2022 2320)  Activity Management: Walk with assistive devise and /or staff member - L3  VTE Prevention/Management: ambulation promoted  Intervention: Prevent Infection  Flowsheets (Taken 7/31/2022 2320)  Infection Prevention:   rest/sleep promoted   hand hygiene promoted     Problem: Fall Injury Risk  Goal: Absence of Fall and Fall-Related Injury  Outcome: Ongoing, Progressing  Intervention: Identify and Manage Contributors  Flowsheets (Taken 7/31/2022 2320)  Self-Care Promotion:   independence encouraged   safe use of adaptive equipment encouraged  Intervention: Promote Injury-Free Environment  Flowsheets (Taken 7/31/2022 2320)  Safety Promotion/Fall Prevention:   assistive device/personal item within reach   bed alarm set   Fall Risk reviewed with patient/family   muscle strengthening facilitated   nonskid shoes/socks when out of bed   side rails raised x 3   supervised activity   Supervised toileting - stay within arms reach   instructed to call staff for mobility     Problem: Urinary  Retention  Goal: Effective Urinary Elimination  Outcome: Ongoing, Progressing  Intervention: Promote Effective Urine Elimination  Flowsheets (Taken 7/31/2022 2320)  Urinary Elimination Promotion: catheter patency maintained

## 2022-08-01 NOTE — PT/OT/SLP PROGRESS
Occupational Therapy  Treatment    Name: Adal Phoenix    : 1940 (81 y.o.)  MRN: [id number]           TREATMENT SUMMARY AND RECOMMENDATIONS:      OT Date of Treatment: 22  OT Start Time:   OT Stop Time:   OT Total Time (min): 18 min      Subjective Assessment:   No complaints  Lethargic   X Awake, alert, cooperative  Impulsive    Uncooperative   Flat affect    Agitated  c/o pain    Appropriate  c/o fatigue    Confused  Treated at bedside     Emotionally labile  Treated in gym/dept.      Other:        Therapy Precautions:    Cognitive deficits  Spinal precautions    Collar - hard  Sternal precautions    Collar - soft   TLSO   X Fall risk  LSO    Hip precautions - posterior  Knee immobilizer    Hip precautions - anterior  WBAT    Impaired communication  Partial weightbearing    Oxygen  TTWB    PEG tube  NWB    Visual deficits      Hearing deficits   Other:        Treatment Objectives:     Mobility Training:    Mobility task Assist level Comments    Bed mobility     Transfer     Sit to stands transitions     Functional mobility CGA  With use of RW, Pt ambulated ~350ft. OT noted weight shift into heels during ambulation with unsteadiness at turns. Pt reports this being a chronic presentation due to his wearing of cowboy boots his whole life.    Sitting balance     Standing balance      Other:        ADL Training:    ADL Assist level Comments    Feeding     Grooming/hygiene     Bathing     Upper body dressing     Lower body dressing     Toileting     Toilet transfer     Adaptive equipment training     Other:           Therapeutic Exercise:   Exercise Sets Reps Comments                               Additional Comments:  Pt reports willing to only complete mobility this AM with no desire to go to gym and complete exercises.     Assessment: Patient tolerated session well. Good endurance noted for functional mobility with no rest break needed.     OT Plan: continue with current POC   Revisions made to plan  of care: No    GOALS:   Multidisciplinary Problems     Occupational Therapy Goals        Problem: Occupational Therapy    Goal Priority Disciplines Outcome Interventions   Occupational Therapy Goal     OT, PT/OT Ongoing, Progressing    Description: Goals to be met by: 8/4/22     Patient will increase functional independence with ADLs by performing:    Toileting from bedside commode with Contact Guard Assistance for hygiene and clothing management.   Toilet transfer to bedside commode with Contact Guard Assistance.                     Skilled OT Minutes Provided: 18  Communication with Treatment Team: EVELIN Erazo took vitals prior to functional mobility with a BP of 171/81. OT communicated with Dr. Reyes, whom confirmed Pt okay to walk unless reporting symptoms of headache.     Equipment recommendations:       At end of treatment, patient remained:  X Up in chair     Up in wheelchair in room    In bed   X With alarm activated    Bed rails up   X Call bell in reach    X Family/friends present    Restraints secured properly    In bathroom with CNA/RN notified    In gym with PT/PTA/tech    Nurse aware    Other:

## 2022-08-01 NOTE — PLAN OF CARE
Spoke with patient's daughter, Dora to notify her that [facility] is willing and able to accept patient tomorrow morning. I explained that if she would like patient to continue therapy here that we can swing him and continue therapy. She stated no she would prefer to send him to the nursing home so that they would not lose the bed. Plan for discharge to [facility] tomorrow.

## 2022-08-01 NOTE — ASSESSMENT & PLAN NOTE
-start erythromycin t.i.d. for 10 days on 08/01/2022 as it is not in stock today  -warm compresses  -denies any blurry vision

## 2022-08-02 VITALS
HEIGHT: 74 IN | BODY MASS INDEX: 28.38 KG/M2 | WEIGHT: 221.13 LBS | SYSTOLIC BLOOD PRESSURE: 116 MMHG | HEART RATE: 77 BPM | DIASTOLIC BLOOD PRESSURE: 56 MMHG | OXYGEN SATURATION: 98 % | RESPIRATION RATE: 16 BRPM | TEMPERATURE: 98 F

## 2022-08-02 PROCEDURE — 25000003 PHARM REV CODE 250: Performed by: STUDENT IN AN ORGANIZED HEALTH CARE EDUCATION/TRAINING PROGRAM

## 2022-08-02 RX ADMIN — ZINC SULFATE 220 MG (50 MG) CAPSULE 220 MG: CAPSULE at 09:08

## 2022-08-02 RX ADMIN — AMIODARONE HYDROCHLORIDE 200 MG: 200 TABLET ORAL at 09:08

## 2022-08-02 RX ADMIN — RANOLAZINE 500 MG: 500 TABLET, EXTENDED RELEASE ORAL at 09:08

## 2022-08-02 RX ADMIN — ERYTHROMYCIN: 5 OINTMENT OPHTHALMIC at 09:08

## 2022-08-02 RX ADMIN — TAMSULOSIN HYDROCHLORIDE 0.4 MG: 0.4 CAPSULE ORAL at 09:08

## 2022-08-02 RX ADMIN — Medication 1000 UNITS: at 09:08

## 2022-08-02 RX ADMIN — FAMOTIDINE 20 MG: 20 TABLET ORAL at 09:08

## 2022-08-02 RX ADMIN — SENNOSIDES, DOCUSATE SODIUM 1 TABLET: 8.6; 5 TABLET ORAL at 09:08

## 2022-08-02 RX ADMIN — OXYCODONE HYDROCHLORIDE AND ACETAMINOPHEN 500 MG: 500 TABLET ORAL at 09:08

## 2022-08-02 NOTE — PROGRESS NOTES
"[facility]  Adult Nutrition  Progress Note    SUMMARY       Recommendations    Recommendation/Intervention: 1. continue diabetic diet.    Assessment and Plan    No new Assessment & Plan notes have been filed under this hospital service since the last note was generated.  Service: Nutrition       Malnutrition Assessment                                       Reason for Assessment    Reason For Assessment: length of stay  Diagnosis: other (see comments) (COVID-19 virus infection 7/27/2022    Orthostatic hypotension 7/27/2022    Elevated liver enzymes 7/27/2022    CHF (congestive heart failure) Unknown    Recurrent falls 7/27/2022    Acute urinary retention)  General Information Comments: Pt admitted with COVID-19, limiting amount of people in room. Unable to complete nutrition focused physical exam at this time. Reviewed intake 100%.  8/2/22: Pt reports intake is good. No c/o of at this time.   Nutrition Risk Screen    Nutrition Risk Screen: no indicators present    Nutrition/Diet History         Anthropometrics    Temp: 98.2 °F (36.8 °C)  Height: 6' 2" (188 cm)  Height (inches): 74 in  Weight Method: Bed Scale  Weight: 100.3 kg (221 lb 1.9 oz)  Weight (lb): 221.12 lb  Ideal Body Weight (IBW), Male: 190 lb  % Ideal Body Weight, Male (lb): 108.61 %  BMI (Calculated): 28.4  BMI Grade: 25 - 29.9 - overweight       Lab/Procedures/Meds    Pertinent Labs Reviewed: reviewed  Pertinent Medications Reviewed: reviewed    Physical Findings/Assessment         Estimated/Assessed Needs    Kcal Needs: 2006 kcal (20 kcal/kg/CBW)  Protein  Needs: 100 gm (1.0 gm/kg/CBW)  Fluid Needs: 2006 mL (1 mL/kcal)                        Nutrition Prescription Ordered    Current Diet Order: diabetic    Evaluation of Received Nutrient/Fluid Intake         % Meal Intake: 100%    Nutrition Risk    Level of Risk/Frequency of Follow-up: low - moderate     Monitor and Evaluation    Food and Nutrient Intake: food and " beverage intake  Food and Nutrient Adminstration: diet order     Nutrition Follow-Up    RD Follow-up?: Yes

## 2022-08-02 NOTE — PLAN OF CARE
[facility]  Discharge Final Note    Primary Care Provider: Fernandez Castillo MD    Expected Discharge Date: 8/2/2022    Final Discharge Note (most recent)     Final Note - 08/02/22 1004        Final Note    Assessment Type Final Discharge Note     Anticipated Discharge Disposition [facility]     What phone number can be called within the next 1-3 days to see how you are doing after discharge? [phone]     Hospital Resources/Appts/Education Provided Provided patient/caregiver with written discharge plan information        Post-Acute Status    Post-Acute Authorization Placement     Post-Acute Placement Status Set-up Complete/Auth obtained     Home Health Status Set-up Complete/Auth obtained     Discharge Delays None known at this time                 Important Message from Medicare             Contact Info     Cole Ca MD   Specialty: Urology    [address] [place] [id number]   Phone: [phone]       Next Steps: Follow up    Instructions: The office will call the patient with the appointment date and time.  Spoke to Kassy.

## 2022-08-02 NOTE — NURSING
Patient report called to Thuy. Patient wheeled to NH van VIA NH staff. All patient belongings and D/C instructions given to NH staff. Patients daughter notified of Patients D/C.

## 2022-08-02 NOTE — ASSESSMENT & PLAN NOTE
-Patient has a history ischemic cardiomyopathy with an ejection fraction of 30% and grade II diastolic dysfunction per record review in 2019  -a [employer]a MRI XT DR Defibrillator implanted. It a complete MR Conditional system implanted, consisting of a SureScan device and Surescan leads. Implanted on dec 23, 2019. He sees Dr. Sathish Lockwood and Dr Mert Simmons at [facility]--> interrogated 714  -continue with Entresto, spironolactone, Lasix, ranolazine, discontinued metoprolol on 07/28 secondary to symptomatic orthostatic hypotension

## 2022-08-02 NOTE — ASSESSMENT & PLAN NOTE
-suspect secondary to orthostatic hypotension, metoprolol discontinued on 07/28; monitor response  -CT head showing chronic microvascular change  -PT/OT

## 2022-08-02 NOTE — PLAN OF CARE
Problem: Adult Inpatient Plan of Care  Goal: Plan of Care Review  Outcome: Ongoing, Progressing  Flowsheets (Taken 8/1/2022 2325)  Plan of Care Reviewed With: patient  Goal: Absence of Hospital-Acquired Illness or Injury  Outcome: Ongoing, Progressing  Intervention: Identify and Manage Fall Risk  Flowsheets (Taken 8/1/2022 2325)  Safety Promotion/Fall Prevention:   assistive device/personal item within reach   bed alarm set   Fall Risk reviewed with patient/family   gait belt with ambulation   muscle strengthening facilitated   nonskid shoes/socks when out of bed   side rails raised x 3   supervised activity   Supervised toileting - stay within arms reach   instructed to call staff for mobility  Intervention: Prevent Skin Injury  Flowsheets (Taken 8/1/2022 2325)  Body Position: position changed independently  Skin Protection: incontinence pads utilized  Intervention: Prevent and Manage VTE (Venous Thromboembolism) Risk  Flowsheets (Taken 8/1/2022 2325)  Activity Management: Up in chair - L3  VTE Prevention/Management: ambulation promoted  Intervention: Prevent Infection  Flowsheets (Taken 8/1/2022 2325)  Infection Prevention:   hand hygiene promoted   rest/sleep promoted     Problem: Fall Injury Risk  Goal: Absence of Fall and Fall-Related Injury  Outcome: Ongoing, Progressing  Intervention: Identify and Manage Contributors  Flowsheets (Taken 8/1/2022 2325)  Self-Care Promotion:   independence encouraged   safe use of adaptive equipment encouraged  Intervention: Promote Injury-Free Environment  Flowsheets (Taken 8/1/2022 2325)  Safety Promotion/Fall Prevention:   assistive device/personal item within reach   bed alarm set   Fall Risk reviewed with patient/family   gait belt with ambulation   muscle strengthening facilitated   nonskid shoes/socks when out of bed   side rails raised x 3   supervised activity   Supervised toileting - stay within arms reach   instructed to call staff for mobility     Problem:  Urinary Retention  Goal: Effective Urinary Elimination  Outcome: Ongoing, Progressing  Intervention: Promote Effective Urine Elimination  Flowsheets (Taken 8/1/2022 8895)  Urinary Elimination Promotion: catheter patency maintained

## 2022-08-02 NOTE — ASSESSMENT & PLAN NOTE
-start erythromycin t.i.d. for 10 days on 08/01/2022  -warm compresses  -denies any blurry vision

## 2022-08-02 NOTE — DISCHARGE SUMMARY
[facility]  Hospital Medicine  Discharge Summary      Patient Name: Adal Phoenix  MRN: [id number]  Patient Class: IP- Inpatient  Admission Date: 7/27/2022  Hospital Length of Stay: 4 days  Discharge Date and Time:  08/02/2022 9:04 AM  Attending Physician: Thairy G Reyes, DO   Discharging Provider: Thairy G Reyes, DO  Primary Care Provider: Fernandez Castillo MD      HPI:   81-year-old male with a past medical history of prior MI status post PCI, hypertension, hyperlipidemia who was transferred from [facility] as they had no beds for admission; he was diagnosed with COVID infection 5 days ago and since then he has had a headache, nausea and dizziness and states he just has not felt well; also with a cough and clear sputum; he was to be admitted to [facility] but they had no beds and fell because his blood pressure showed orthostatic hypotension he should be admitted.  Patient also with significantly elevated LFTs compared to baseline a few months ago.  Right upper quadrant ultrasound unremarkable, suspicion for ischemic hepatitis.  During eval here pt states he presented with complaint of passing out. Patient reports for the last 2 weeks his knees have been giving out under him and he falls down however last night for the 1st time he does not recall events leading up to his fall.  Patient does affirm to living alone.  CT head without contrast showed chronic microvascular changes.  Chest x-ray showed no acute thoracic abnormality.      * No surgery found *      Hospital Course:   Patient admitted for syncopal episode and incidentally found to have elevated LFTs likely secondary to ischemic hepatitis from volume depletion secondary to COVID-19.  However while admitted at our facility patient has been noted to have significant orthostatic hypotension.  This is likely the source of patient's syncopal episodes outpatient.  Discontinued metoprolol, will monitor response.  As far as COVID-19  patient is asymptomatic and not requiring any supplemental oxygen.  When patient arrived as a transfer he had Bee catheter in place for acute retention.  Bee catheter was removed and he required intermittent catheterization twice overnight.  Bee catheter will be replaced and patient will be discharged with a Bee catheter.  Patient with left eye blepharitis, erythromycin ointment and warm compress.  DC to NH home today. Pt ambulating without difficulty with a walker.        Goals of Care Treatment Preferences:  Code Status: Full Code      Consults:     * COVID-19 virus infection  -Conservative management, pt requiring no supplemental O2 at this time  -inflammatory markers mildly elevated  -Vit C, D, zinc  -out of isolation    Elevated liver enzymes  -downtrending  -suspect ischemic hepatitis 2/2 hypotension  -Right upper quadrant ultrasound showed hepatic steatosis, normal caliber common bile duct, negative Rivers's    Orthostatic hypotension  -discontinued metoprolol on 7/28      CHF (congestive heart failure)  -Patient has a history ischemic cardiomyopathy with an ejection fraction of 30% and grade II diastolic dysfunction per record review in 2019  -a Medudema MRI XT DR Defibrillator implanted. It a complete MR Conditional system implanted, consisting of a SureScan device and Surescan leads. Implanted on dec 23, 2019. He sees Dr. Sathish Lockwood and Dr Mert Simmons at [facility]--> interrogated 714  -continue with Entresto, spironolactone, Lasix, ranolazine, discontinued metoprolol on 07/28 secondary to symptomatic orthostatic hypotension        Recurrent falls  -suspect secondary to orthostatic hypotension, metoprolol discontinued on 07/28; monitor response  -CT head showing chronic microvascular change  -PT/OT      Blepharitis of left lower eyelid  -start erythromycin t.i.d. for 10 days on 08/01/2022  -warm compresses  -denies any blurry vision      Acute urinary retention  -discharge with Bee catheter, urology  appt scheduled for follow up        Final Active Diagnoses:    Diagnosis Date Noted POA    PRINCIPAL PROBLEM:  COVID-19 virus infection [U07.1] 07/27/2022 Yes    Elevated liver enzymes [R74.8] 07/27/2022 Yes    Orthostatic hypotension [I95.1] 07/27/2022 Yes    CHF (congestive heart failure) [I50.9]  Yes    Recurrent falls [R29.6] 07/27/2022 Not Applicable    Blepharitis of left lower eyelid [H01.005] 07/31/2022 Yes    Acute urinary retention [R33.8] 07/28/2022 Yes      Problems Resolved During this Admission:       Discharged Condition: stable    Disposition: Skilled Nursing Facility    Follow Up:   Follow-up Information     Cole Ca MD Follow up.    Specialty: Urology  Why: The office will call the patient with the appointment date and time.  Spoke to [place].  Contact information:  [address] [place] [address]  [phone]                       Patient Instructions:   No discharge procedures on file.    Significant Diagnostic Studies: Labs: BMP: No results for input(s): GLU, NA, K, CL, CO2, BUN, CREATININE, CALCIUM, MG in the last 48 hours.    Pending Diagnostic Studies:     None         Medications:  Reconciled Home Medications:      Medication List      START taking these medications    ascorbic acid (vitamin C) 500 MG tablet  Commonly known as: VITAMIN C  Take 1 tablet (500 mg total) by mouth once daily.     erythromycin ophthalmic ointment  Commonly known as: ROMYCIN  Place into the left eye 3 (three) times daily.     vitamin D 1000 units Tab  Commonly known as: VITAMIN D3  Take 1 tablet (1,000 Units total) by mouth once daily.     zinc sulfate 50 mg zinc (220 mg) capsule  Commonly known as: ZINCATE  Take 1 capsule (220 mg total) by mouth once daily.        CONTINUE taking these medications    amiodarone 200 MG Tab  Commonly known as: PACERONE  Take 200 mg by mouth 2 (two) times a day.     aspirin 81 MG Chew  Take 81 mg by mouth Daily.     atorvastatin 40 MG  tablet  Commonly known as: LIPITOR  Take 40 mg by mouth nightly.     ENTRESTO ORAL  Take 1 tablet by mouth 2 (two) times a day.     furosemide 40 MG tablet  Commonly known as: LASIX  Take 40 mg by mouth Daily.     levothyroxine 125 MCG tablet  Commonly known as: SYNTHROID  Take 125 mcg by mouth Daily.     ranolazine 500 MG Tb12  Commonly known as: RANEXA  Take 500 mg by mouth 2 (two) times a day.     spironolactone 25 MG tablet  Commonly known as: ALDACTONE  Take 12.5 mg by mouth Daily.        STOP taking these medications    cloNIDine 0.1 MG tablet  Commonly known as: CATAPRES     HYDROcodone-acetaminophen 5-325 mg per tablet  Commonly known as: NORCO     imiquimod 5 % cream  Commonly known as: ALDARA     LISINOPRIL ORAL     metoprolol succinate 50 MG 24 hr tablet  Commonly known as: TOPROL-XL            Indwelling Lines/Drains at time of discharge:   Lines/Drains/Airways     Drain  Duration                Urethral Catheter 07/28/22 1620 Straight-tip 16 Fr. 4 days                Time spent on the discharge of patient: 40 minutes         Thairy G Reyes, DO  Department of Hospital Medicine  [facility]

## 2022-08-03 NOTE — PT/OT/SLP DISCHARGE
Physical Therapy Discharge Summary    Name: Adal Phoenix  MRN: [id number]   Principal Problem: COVID-19 virus infection     Patient Discharged from acute Physical Therapy on 2022.  Please refer to prior PT noted date on 2022 for functional status.     Assessment:     Patient transferred to lower level of care secondary to Patient was making great progress towards goals at time of discharge    Objective:     GOALS:   Multidisciplinary Problems     Physical Therapy Goals        Problem: Physical Therapy    Goal Priority Disciplines Outcome Goal Variances Interventions   Physical Therapy Goal     PT, PT/OT Adequate for Care Transition     Description: Goals to be met by: Discharge     Patient will increase functional independence with mobility by performin. Supine to sit with Modified Cascade  2. Sit to supine with Modified Cascade  3. Sit to stand transfer with Modified Cascade  4. Bed to chair transfer with Modified Cascade using Rolling Walker vs LRAD  5. Gait  x 200 feet with Modified Cascade using Rolling Walker vs LRAD.   6. Ascend/descend 5 stair with left Handrails Stand-by Assistance using No Assistive Device.                      Reasons for Discontinuation of Therapy Services  Transfer to alternate level of care.      Plan:     Patient Discharged to: Skilled Nursing Facility.      8/3/2022

## 2023-04-25 LAB
CHOLEST SERPL-MSCNC: 165 MG/DL (ref 0–200)
EGFR: 53.1 ML/MIN/1.73M
HDLC SERPL-MCNC: 69 MG/DL (ref 35–70)
LDLC SERPL CALC-MCNC: 67 MG/DL (ref 0–160)
TRIGL SERPL-MCNC: 103 MG/DL (ref 40–160)

## 2023-05-04 ENCOUNTER — DOCUMENTATION ONLY (OUTPATIENT)
Dept: FAMILY MEDICINE | Facility: CLINIC | Age: 83
End: 2023-05-04
Payer: MEDICARE

## 2023-08-21 DIAGNOSIS — E03.9 HYPOTHYROIDISM, UNSPECIFIED TYPE: ICD-10-CM

## 2023-08-21 RX ORDER — LEVOTHYROXINE SODIUM 125 UG/1
125 TABLET ORAL EVERY MORNING
Qty: 30 TABLET | Refills: 6 | Status: SHIPPED | OUTPATIENT
Start: 2023-08-21

## 2023-08-24 RX ORDER — SACUBITRIL AND VALSARTAN 49; 51 MG/1; MG/1
1 TABLET, FILM COATED ORAL 2 TIMES DAILY
COMMUNITY
Start: 2023-08-08

## 2023-08-29 ENCOUNTER — OFFICE VISIT (OUTPATIENT)
Dept: FAMILY MEDICINE | Facility: CLINIC | Age: 83
End: 2023-08-29
Payer: MEDICARE

## 2023-08-29 VITALS
HEIGHT: 72 IN | TEMPERATURE: 98 F | DIASTOLIC BLOOD PRESSURE: 76 MMHG | HEART RATE: 84 BPM | BODY MASS INDEX: 28.93 KG/M2 | WEIGHT: 213.63 LBS | SYSTOLIC BLOOD PRESSURE: 136 MMHG | OXYGEN SATURATION: 97 % | RESPIRATION RATE: 18 BRPM

## 2023-08-29 DIAGNOSIS — R79.89 ELEVATED LFTS: ICD-10-CM

## 2023-08-29 DIAGNOSIS — E03.9 HYPOTHYROIDISM, UNSPECIFIED TYPE: Primary | ICD-10-CM

## 2023-08-29 DIAGNOSIS — I50.9 CHRONIC CONGESTIVE HEART FAILURE, UNSPECIFIED HEART FAILURE TYPE: ICD-10-CM

## 2023-08-29 PROBLEM — E78.5 HYPERLIPIDEMIA: Status: ACTIVE | Noted: 2020-02-06

## 2023-08-29 PROBLEM — I10 ESSENTIAL HYPERTENSION: Status: ACTIVE | Noted: 2020-02-06

## 2023-08-29 PROCEDURE — 99204 OFFICE O/P NEW MOD 45 MIN: CPT | Mod: ,,, | Performed by: FAMILY MEDICINE

## 2023-08-29 PROCEDURE — 99204 PR OFFICE/OUTPT VISIT, NEW, LEVL IV, 45-59 MIN: ICD-10-PCS | Mod: ,,, | Performed by: FAMILY MEDICINE

## 2023-08-29 RX ORDER — NITROGLYCERIN 0.4 MG/1
0.4 TABLET SUBLINGUAL EVERY 5 MIN PRN
COMMUNITY

## 2023-08-29 NOTE — PROGRESS NOTES
Patient ID: [id number]     Chief Complaint: Establish Care and Hypothyroidism        HPI:     Adal Phoenix is a 83 y.o. male here today for Establish Care and Hypothyroidism. Has been on Synthroid 125mcg.     ----------------------------  CAD (coronary artery disease)  Cancer  Cataract  Congestive heart failure (CHF)  Coronary artery disease  Elevated LFTs  HLD (hyperlipidemia)  HTN (hypertension)  Hypothyroidism  Liver disease  Mitral valve regurgitation  Myocardial infarction  S/P CABG (coronary artery bypass graft)  Squamous cell carcinoma in situ  Thyroid disease  Tremor  Tricuspid valve regurgitation  Ventricular tachycardia     Past Surgical History:   Procedure Laterality Date    ANKLE ARTHROSCOPY W/ OPEN REPAIR Left     BILATERAL INGUINAL HERNIA REPAIR  01/04/2019    Dr Brian Catherine    CARDIAC PACEMAKER PLACEMENT  12/23/2019    CATARACT EXTRACTION W/  INTRAOCULAR LENS IMPLANT      CORONARY ANGIOPLASTY WITH STENT PLACEMENT  04/22/2002    Dr Hans Moran    CORONARY ANGIOPLASTY WITH STENT PLACEMENT  06/16/2003    Dr Hans Moran    CORONARY ARTERY BYPASS GRAFT      EXCISION OF LESION Right 05/27/2022    Dr Gwendolyn Joel MD    EYE SURGERY      SQUAMOUS CELL CARCINOMA EXCISION Left 02/20/2018    ear - Dr Mert Ordaz       Review of patient's allergies indicates:  No Known Allergies    Outpatient Medications Marked as Taking for the 8/29/23 encounter (Office Visit) with Avila Johnson,    Medication Sig Dispense Refill    amiodarone (PACERONE) 200 MG Tab Take 200 mg by mouth 2 (two) times a day.      aspirin 81 MG Chew Take 81 mg by mouth Daily.      atorvastatin (LIPITOR) 40 MG tablet Take 40 mg by mouth nightly.      ENTRESTO 49-51 mg per tablet Take 1 tablet by mouth 2 (two) times daily.      furosemide (LASIX) 40 MG tablet Take 40 mg by mouth Daily.      levothyroxine (SYNTHROID) 125 MCG tablet TAKE ONE TABLET BY MOUTH EVERY MORNING ON AN EMPTY STOMACH 30 tablet 6    nitroGLYCERIN (NITROSTAT) 0.4 MG  SL tablet Place 0.4 mg under the tongue every 5 (five) minutes as needed for Chest pain.      ranolazine (RANEXA) 500 MG Tb12 Take 500 mg by mouth 2 (two) times a day.      sacubitril/valsartan (ENTRESTO ORAL) Take 1 tablet by mouth 2 (two) times a day.      spironolactone (ALDACTONE) 25 MG tablet Take 12.5 mg by mouth Daily.         Social History     Socioeconomic History    Marital status:    Tobacco Use    Smoking status: Former     Types: Cigars    Smokeless tobacco: Never   Substance and Sexual Activity    Alcohol use: Not Currently    Drug use: Never    Sexual activity: Not Currently     Social Determinants of Health     Financial Resource Strain: Low Risk  (8/29/2023)    Overall Financial Resource Strain (CARDIA)     Difficulty of Paying Living Expenses: Not hard at all   Food Insecurity: No Food Insecurity (8/29/2023)    Hunger Vital Sign     Worried About Running Out of Food in the Last Year: Never true     Ran Out of Food in the Last Year: Never true   Transportation Needs: No Transportation Needs (8/29/2023)    PRAPARE - Transportation     Lack of Transportation (Medical): No     Lack of Transportation (Non-Medical): No   Physical Activity: Insufficiently Active (8/29/2023)    Exercise Vital Sign     Days of Exercise per Week: 6 days     Minutes of Exercise per Session: 20 min   Stress: No Stress Concern Present (8/29/2023)    [employer] - Occupational Stress Questionnaire     Feeling of Stress : Not at all   Social Connections: Moderately Integrated (8/29/2023)    Social Connection and Isolation Panel [NHANES]     Frequency of Communication with Friends and Family: More than three times a week     Frequency of Social Gatherings with Friends and Family: Three times a week     Attends [employer] Services: More than 4 times per year     Active Member of Clubs or Organizations: No     Attends Club or Organization Meetings: More than 4 times per year     Marital Status:  "   Housing Stability: Low Risk  (8/29/2023)    Housing Stability Vital Sign     Unable to Pay for Housing in the Last Year: No     Number of Places Lived in the Last Year: 1     Unstable Housing in the Last Year: No        Family History   Problem Relation Age of Onset    Hypertension Mother     Hypertension Father     Heart attack Father     Heart attack Sister     Heart attack Sister     Heart attack Sister         Patient Care Team:  Avila Johnson DO as PCP - General (Family Medicine)  Sathish Lockwood MD as Consulting Physician (Cardiology)  Romaine Simmons MD as Consulting Physician (Cardiology)  Chuckie Jackson FNP (Dermatology)  Federico Cruz MD (Dermatology)     Subjective:     Review of Systems   Constitutional:  Negative for chills and fever.   Respiratory:  Negative for shortness of breath.    Cardiovascular:  Negative for chest pain.   Gastrointestinal:  Negative for constipation and diarrhea.   Neurological:  Negative for dizziness and headaches.   Psychiatric/Behavioral:  The patient does not have insomnia.        See HPI for details  All Other ROS: Negative except as stated in HPI.       Objective:     /76   Pulse 84   Temp 98.4 °F (36.9 °C) (Tympanic)   Resp 18   Ht 6' 0.44" (1.84 m)   Wt 96.9 kg (213 lb 9.6 oz)   SpO2 97%   BMI 28.62 kg/m²     Physical Exam  Vitals reviewed.   Constitutional:       General: He is not in acute distress.     Appearance: Normal appearance. He is not ill-appearing.   Cardiovascular:      Rate and Rhythm: Normal rate and regular rhythm.      Pulses: Normal pulses.      Heart sounds: Normal heart sounds. No murmur heard.     No friction rub. No gallop.   Pulmonary:      Effort: No respiratory distress.      Breath sounds: No wheezing, rhonchi or rales.   Musculoskeletal:         General: No swelling.      Right lower leg: No edema.      Left lower leg: No edema.   Skin:     General: Skin is warm and dry.   Neurological:      General: No focal " deficit present.      Mental Status: He is alert.   Psychiatric:         Mood and Affect: Mood normal.         Behavior: Behavior normal.         Assessment/Plan:     1. Hypothyroidism, unspecified type  -     CBC Auto Differential; Future; Expected date: 08/29/2023  -     Comprehensive Metabolic Panel; Future; Expected date: 08/29/2023  -     TSH; Future; Expected date: 08/29/2023  -     T4, Free; Future; Expected date: 08/29/2023    2. Elevated LFTs  -     CBC Auto Differential; Future; Expected date: 08/29/2023  -     Comprehensive Metabolic Panel; Future; Expected date: 08/29/2023  -     TSH; Future; Expected date: 08/29/2023    3. Chronic congestive heart failure, unspecified heart failure type      On Entresto, amiodarone, and spironolactone    Follow up:     Follow up in about 3 months (around 11/29/2023) for Medicare Wellness. In addition to their scheduled follow up, the patient has also been instructed to follow up on as needed basis.

## 2023-08-30 ENCOUNTER — LAB VISIT (OUTPATIENT)
Dept: LAB | Facility: HOSPITAL | Age: 83
End: 2023-08-30
Attending: FAMILY MEDICINE
Payer: MEDICARE

## 2023-08-30 DIAGNOSIS — R79.89 ELEVATED LFTS: ICD-10-CM

## 2023-08-30 DIAGNOSIS — E03.9 HYPOTHYROIDISM, UNSPECIFIED TYPE: ICD-10-CM

## 2023-08-30 LAB
ALBUMIN SERPL-MCNC: 3.6 G/DL (ref 3.4–4.8)
ALBUMIN/GLOB SERPL: 1.2 RATIO (ref 1.1–2)
ALP SERPL-CCNC: 78 UNIT/L (ref 40–150)
ALT SERPL-CCNC: 42 UNIT/L (ref 0–55)
AST SERPL-CCNC: 34 UNIT/L (ref 5–34)
BASOPHILS # BLD AUTO: 0.02 X10(3)/MCL
BASOPHILS NFR BLD AUTO: 0.4 %
BILIRUB SERPL-MCNC: 0.5 MG/DL
BUN SERPL-MCNC: 18 MG/DL (ref 8.4–25.7)
CALCIUM SERPL-MCNC: 9.1 MG/DL (ref 8.8–10)
CHLORIDE SERPL-SCNC: 104 MMOL/L (ref 98–107)
CO2 SERPL-SCNC: 29 MMOL/L (ref 23–31)
CREAT SERPL-MCNC: 1.3 MG/DL (ref 0.73–1.18)
EOSINOPHIL # BLD AUTO: 0.16 X10(3)/MCL (ref 0–0.9)
EOSINOPHIL NFR BLD AUTO: 3 %
ERYTHROCYTE [DISTWIDTH] IN BLOOD BY AUTOMATED COUNT: 12.2 % (ref 11.5–17)
GFR SERPLBLD CREATININE-BSD FMLA CKD-EPI: 55 MLS/MIN/1.73/M2
GLOBULIN SER-MCNC: 3 GM/DL (ref 2.4–3.5)
GLUCOSE SERPL-MCNC: 99 MG/DL (ref 82–115)
HCT VFR BLD AUTO: 39.8 % (ref 42–52)
HGB BLD-MCNC: 13.1 G/DL (ref 14–18)
IMM GRANULOCYTES # BLD AUTO: 0.02 X10(3)/MCL (ref 0–0.04)
IMM GRANULOCYTES NFR BLD AUTO: 0.4 %
LYMPHOCYTES # BLD AUTO: 1.51 X10(3)/MCL (ref 0.6–4.6)
LYMPHOCYTES NFR BLD AUTO: 28.5 %
MCH RBC QN AUTO: 31.5 PG (ref 27–31)
MCHC RBC AUTO-ENTMCNC: 32.9 G/DL (ref 33–36)
MCV RBC AUTO: 95.7 FL (ref 80–94)
MONOCYTES # BLD AUTO: 0.5 X10(3)/MCL (ref 0.1–1.3)
MONOCYTES NFR BLD AUTO: 9.4 %
NEUTROPHILS # BLD AUTO: 3.09 X10(3)/MCL (ref 2.1–9.2)
NEUTROPHILS NFR BLD AUTO: 58.3 %
NRBC BLD AUTO-RTO: 0 %
PLATELET # BLD AUTO: 265 X10(3)/MCL (ref 130–400)
PMV BLD AUTO: 9.7 FL (ref 7.4–10.4)
POTASSIUM SERPL-SCNC: 4.7 MMOL/L (ref 3.5–5.1)
PROT SERPL-MCNC: 6.6 GM/DL (ref 5.8–7.6)
RBC # BLD AUTO: 4.16 X10(6)/MCL (ref 4.7–6.1)
SODIUM SERPL-SCNC: 143 MMOL/L (ref 136–145)
T4 FREE SERPL-MCNC: 1.34 NG/DL (ref 0.7–1.48)
TSH SERPL-ACNC: 2.7 UIU/ML (ref 0.35–4.94)
WBC # SPEC AUTO: 5.3 X10(3)/MCL (ref 4.5–11.5)

## 2023-08-30 PROCEDURE — 80053 COMPREHEN METABOLIC PANEL: CPT

## 2023-08-30 PROCEDURE — 84439 ASSAY OF FREE THYROXINE: CPT

## 2023-08-30 PROCEDURE — 84443 ASSAY THYROID STIM HORMONE: CPT

## 2023-08-30 PROCEDURE — 36415 COLL VENOUS BLD VENIPUNCTURE: CPT

## 2023-08-30 PROCEDURE — 85025 COMPLETE CBC W/AUTO DIFF WBC: CPT

## 2023-10-04 ENCOUNTER — TELEPHONE (OUTPATIENT)
Dept: FAMILY MEDICINE | Facility: CLINIC | Age: 83
End: 2023-10-04
Payer: MEDICARE

## 2023-10-04 NOTE — TELEPHONE ENCOUNTER
----- Message from Avila Johnson DO sent at 9/16/2023  4:22 PM CDT -----  Please inform patient of lab results.    1.  Kidney function is a little down, Recommend increasing water intake a bit. Thyroid labs are good.     2. Other labwork within acceptable ranges.    Thanks,    Dr. Johnson

## 2023-10-30 ENCOUNTER — LAB VISIT (OUTPATIENT)
Dept: LAB | Facility: HOSPITAL | Age: 83
End: 2023-10-30
Attending: INTERNAL MEDICINE
Payer: MEDICARE

## 2023-10-30 DIAGNOSIS — E78.5 HYPERLIPIDEMIA, UNSPECIFIED HYPERLIPIDEMIA TYPE: ICD-10-CM

## 2023-10-30 DIAGNOSIS — I25.10 CORONARY ATHEROSCLEROSIS OF NATIVE CORONARY ARTERY: Primary | ICD-10-CM

## 2023-10-30 DIAGNOSIS — I10 ESSENTIAL HYPERTENSION, MALIGNANT: ICD-10-CM

## 2023-10-30 LAB
ALBUMIN SERPL-MCNC: 3.8 G/DL (ref 3.4–4.8)
ALBUMIN/GLOB SERPL: 1.3 RATIO (ref 1.1–2)
ALP SERPL-CCNC: 78 UNIT/L (ref 40–150)
ALT SERPL-CCNC: 37 UNIT/L (ref 0–55)
AST SERPL-CCNC: 37 UNIT/L (ref 5–34)
BILIRUB SERPL-MCNC: 0.6 MG/DL
BUN SERPL-MCNC: 13 MG/DL (ref 8.4–25.7)
CALCIUM SERPL-MCNC: 9.5 MG/DL (ref 8.8–10)
CHLORIDE SERPL-SCNC: 105 MMOL/L (ref 98–107)
CHOLEST SERPL-MCNC: 153 MG/DL
CHOLEST/HDLC SERPL: 2 {RATIO} (ref 0–5)
CO2 SERPL-SCNC: 28 MMOL/L (ref 23–31)
CREAT SERPL-MCNC: 1.51 MG/DL (ref 0.73–1.18)
GFR SERPLBLD CREATININE-BSD FMLA CKD-EPI: 46 MLS/MIN/1.73/M2
GLOBULIN SER-MCNC: 3 GM/DL (ref 2.4–3.5)
GLUCOSE SERPL-MCNC: 98 MG/DL (ref 82–115)
HDLC SERPL-MCNC: 63 MG/DL (ref 35–60)
LDLC SERPL CALC-MCNC: 70 MG/DL (ref 50–140)
POTASSIUM SERPL-SCNC: 4.9 MMOL/L (ref 3.5–5.1)
PROT SERPL-MCNC: 6.8 GM/DL (ref 5.8–7.6)
SODIUM SERPL-SCNC: 141 MMOL/L (ref 136–145)
TRIGL SERPL-MCNC: 98 MG/DL (ref 34–140)
VLDLC SERPL CALC-MCNC: 20 MG/DL

## 2023-10-30 PROCEDURE — 80061 LIPID PANEL: CPT

## 2023-10-30 PROCEDURE — 36415 COLL VENOUS BLD VENIPUNCTURE: CPT

## 2023-10-30 PROCEDURE — 80053 COMPREHEN METABOLIC PANEL: CPT

## 2023-12-04 ENCOUNTER — OFFICE VISIT (OUTPATIENT)
Dept: FAMILY MEDICINE | Facility: CLINIC | Age: 83
End: 2023-12-04
Payer: MEDICARE

## 2023-12-04 VITALS
WEIGHT: 209.63 LBS | HEIGHT: 72 IN | HEART RATE: 83 BPM | SYSTOLIC BLOOD PRESSURE: 130 MMHG | TEMPERATURE: 98 F | OXYGEN SATURATION: 97 % | DIASTOLIC BLOOD PRESSURE: 63 MMHG | BODY MASS INDEX: 28.39 KG/M2 | RESPIRATION RATE: 20 BRPM

## 2023-12-04 DIAGNOSIS — I10 ESSENTIAL HYPERTENSION: ICD-10-CM

## 2023-12-04 DIAGNOSIS — E78.5 HYPERLIPIDEMIA, UNSPECIFIED HYPERLIPIDEMIA TYPE: ICD-10-CM

## 2023-12-04 DIAGNOSIS — I50.9 CONGESTIVE HEART FAILURE, UNSPECIFIED HF CHRONICITY, UNSPECIFIED HEART FAILURE TYPE: ICD-10-CM

## 2023-12-04 DIAGNOSIS — D64.9 ANEMIA, UNSPECIFIED TYPE: ICD-10-CM

## 2023-12-04 DIAGNOSIS — Z00.00 WELL ADULT EXAM: Primary | ICD-10-CM

## 2023-12-04 PROCEDURE — G0439 PPPS, SUBSEQ VISIT: HCPCS | Mod: ,,,

## 2023-12-04 PROCEDURE — G0439 PR MEDICARE ANNUAL WELLNESS SUBSEQUENT VISIT: ICD-10-PCS | Mod: ,,,

## 2023-12-04 RX ORDER — FUROSEMIDE 20 MG/1
20 TABLET ORAL DAILY
COMMUNITY

## 2023-12-04 NOTE — ASSESSMENT & PLAN NOTE
Well controlled.   Continue Aldactone 12.5 mg + Entresto 49-51 BID.   Low Sodium Diet (DASH Diet - Less than 2 grams of sodium per day).  Monitor blood pressure daily and log. Report consistent numbers greater than 140/90.  Maintain healthy weight with goal BMI <30. Exercise 30 minutes per day, 5 days per week.

## 2023-12-04 NOTE — PROGRESS NOTES
Patient ID: [id number]     Chief Complaint: Medicare Annual Wellness     HPI:     Adal Phoenix is a 83 y.o. male here today for a Medicare Annual Wellness visit and comprehensive Health Risk Assessment.     A separate E/M code has been provided to evaluate additional complaints that the patient would like addressed during the dedicated Medicare Wellness Exam.    Health Maintenance   Topic Date Due    TETANUS VACCINE  Never done    Shingles Vaccine (1 of 2) Never done    Lipid Panel  10/30/2028        Past Medical History:   Diagnosis Date    CAD (coronary artery disease)     Cancer     Cataract     Congestive heart failure (CHF)     Coronary artery disease     Elevated LFTs     HLD (hyperlipidemia)     HTN (hypertension)     Hypothyroidism     Liver disease     Mitral valve regurgitation     Myocardial infarction     S/P CABG (coronary artery bypass graft)     Squamous cell carcinoma in situ     Thyroid disease     Tremor     Tricuspid valve regurgitation     Ventricular tachycardia         Past Surgical History:   Procedure Laterality Date    ANKLE ARTHROSCOPY W/ OPEN REPAIR Left     BILATERAL INGUINAL HERNIA REPAIR  01/04/2019    Dr Brian Catherine    CARDIAC PACEMAKER PLACEMENT  12/23/2019    CATARACT EXTRACTION W/  INTRAOCULAR LENS IMPLANT      CORONARY ANGIOPLASTY WITH STENT PLACEMENT  04/22/2002    Dr Hans Moran    CORONARY ANGIOPLASTY WITH STENT PLACEMENT  06/16/2003    Dr Hans Moran    CORONARY ARTERY BYPASS GRAFT      EXCISION OF LESION Right 05/27/2022    Dr Gwendolyn Joel MD    EYE SURGERY      SQUAMOUS CELL CARCINOMA EXCISION Left 02/20/2018    ear - Dr Mert Ordaz        Social History     Socioeconomic History    Marital status:    Tobacco Use    Smoking status: Former     Types: Cigars    Smokeless tobacco: Never   Substance and Sexual Activity    Alcohol use: Not Currently    Drug use: Never    Sexual activity: Not Currently     Social Determinants of Health     Financial Resource Strain:  Low Risk  (8/29/2023)    Overall Financial Resource Strain (CARDIA)     Difficulty of Paying Living Expenses: Not hard at all   Food Insecurity: No Food Insecurity (8/29/2023)    Hunger Vital Sign     Worried About Running Out of Food in the Last Year: Never true     Ran Out of Food in the Last Year: Never true   Transportation Needs: No Transportation Needs (8/29/2023)    PRAPARE - Transportation     Lack of Transportation (Medical): No     Lack of Transportation (Non-Medical): No   Physical Activity: Insufficiently Active (8/29/2023)    Exercise Vital Sign     Days of Exercise per Week: 6 days     Minutes of Exercise per Session: 20 min   Stress: No Stress Concern Present (8/29/2023)    [employer] - Occupational Stress Questionnaire     Feeling of Stress : Not at all   Social Connections: Moderately Integrated (8/29/2023)    Social Connection and Isolation Panel [NHANES]     Frequency of Communication with Friends and Family: More than three times a week     Frequency of Social Gatherings with Friends and Family: Three times a week     Attends [employer] Services: More than 4 times per year     Active Member of Clubs or Organizations: No     Attends Club or Organization Meetings: More than 4 times per year     Marital Status:    Housing Stability: Low Risk  (8/29/2023)    Housing Stability Vital Sign     Unable to Pay for Housing in the Last Year: No     Number of Places Lived in the Last Year: 1     Unstable Housing in the Last Year: No        Family History   Problem Relation Age of Onset    Hypertension Mother     Hypertension Father     Heart attack Father     Heart attack Sister     Heart attack Sister     Heart attack Sister         Current Outpatient Medications   Medication Instructions    amiodarone (PACERONE) 200 mg, Oral, 2 times daily    aspirin 81 mg, Oral, Daily    atorvastatin (LIPITOR) 40 mg, Oral, Nightly    ENTRESTO 49-51 mg per tablet 1 tablet, Oral, 2 times  daily    furosemide (LASIX) 20 mg, Oral, Daily    levothyroxine (SYNTHROID) 125 mcg, Oral, Every morning    nitroGLYCERIN (NITROSTAT) 0.4 mg, Sublingual, Every 5 min PRN    ranolazine (RANEXA) 500 mg, Oral, 2 times daily    spironolactone (ALDACTONE) 12.5 mg, Oral, Daily       Review of patient's allergies indicates:  No Known Allergies     Immunization History   Administered Date(s) Administered    COVID-19, MRNA, LN-S, PF (Pfizer) (Purple Cap) 02/01/2021, 02/22/2021, 12/10/2021    Influenza - High Dose - PF (65 years and older) 10/02/2019        Patient Care Team:  Avila Johnson DO as PCP - General (Family Medicine)  Sathish Lockwood MD as Consulting Physician (Cardiology)  Romaine Simmons MD as Consulting Physician (Cardiology)  Chuckie Jackson FNP (Dermatology)  Federico Cruz MD (Dermatology)    Subjective:     Review of Systems    12 point review of systems conducted, negative except as stated in the history of present illness. See HPI for details.    Objective:     Visit Vitals  /63 (BP Location: Right arm, Patient Position: Sitting, BP Method: Large (Automatic))   Pulse 83   Temp 98.4 °F (36.9 °C)   Resp 20   Ht 6' (1.829 m)   Wt 95.1 kg (209 lb 9.6 oz)   SpO2 97%   BMI 28.43 kg/m²       Physical Exam  Vitals and nursing note reviewed.   Constitutional:       General: He is not in acute distress.     Appearance: He is not ill-appearing.   HENT:      Head: Normocephalic and atraumatic.      Mouth/Throat:      Mouth: Mucous membranes are moist.      Pharynx: Oropharynx is clear.   Eyes:      General: No scleral icterus.     Extraocular Movements: Extraocular movements intact.      Conjunctiva/sclera: Conjunctivae normal.      Pupils: Pupils are equal, round, and reactive to light.   Neck:      Vascular: No carotid bruit.   Cardiovascular:      Rate and Rhythm: Normal rate and regular rhythm.      Heart sounds: No murmur heard.     No friction rub. No gallop.   Pulmonary:      Effort: Pulmonary  "effort is normal. No respiratory distress.      Breath sounds: Normal breath sounds. No wheezing, rhonchi or rales.   Abdominal:      General: Abdomen is flat. Bowel sounds are normal. There is no distension.      Palpations: Abdomen is soft. There is no mass.      Tenderness: There is no abdominal tenderness.   Musculoskeletal:         General: Normal range of motion.      Cervical back: Normal range of motion and neck supple.   Skin:     General: Skin is warm and dry.   Neurological:      General: No focal deficit present.      Mental Status: He is alert.   Psychiatric:         Mood and Affect: Mood normal.         Labs Reviewed:     Chemistry:  Lab Results   Component Value Date     10/30/2023    K 4.9 10/30/2023    CHLORIDE 105 10/30/2023    BUN 13.0 10/30/2023    CREATININE 1.51 (H) 10/30/2023    EGFRNORACEVR 46 10/30/2023    GLUCOSE 98 10/30/2023    CALCIUM 9.5 10/30/2023    ALKPHOS 78 10/30/2023    LABPROT 6.8 10/30/2023    ALBUMIN 3.8 10/30/2023    BILIDIR 0.3 03/17/2022    IBILI 0.40 03/17/2022    AST 37 (H) 10/30/2023    ALT 37 10/30/2023    MG 1.90 07/27/2022    TSH 2.696 08/30/2023    UJTYVT8LLUB 1.34 08/30/2023    PSA 0.55 11/17/2021        No results found for: "HGBA1C", "MICROALBCREA"     Hematology:  Lab Results   Component Value Date    WBC 5.30 08/30/2023    HGB 13.1 (L) 08/30/2023    HCT 39.8 (L) 08/30/2023     08/30/2023       Lipid Panel:  Lab Results   Component Value Date    CHOL 153 10/30/2023    HDL 63 (H) 10/30/2023    LDL 70.00 10/30/2023    TRIG 98 10/30/2023    TOTALCHOLEST 2 10/30/2023        Urine:  Lab Results   Component Value Date    COLORUA Yellow 07/26/2022    APPEARANCEUA SL CLOUDY (A) 07/26/2022    SGUA 1.015 07/26/2022    PHUA 5.5 07/26/2022    PROTEINUA Negative 07/26/2022    GLUCOSEUA Negative 07/26/2022    KETONESUA 1+ (A) 07/26/2022    BLOODUA Negative 07/26/2022    NITRITESUA Negative 07/26/2022    LEUKOCYTESUR Negative 07/26/2022    RBCUA 0-2 07/26/2022    " WBCUA 3-5 07/26/2022    BACTERIA Rare 07/26/2022        Assessment:       ICD-10-CM ICD-9-CM   1. Well adult exam  Z00.00 V70.0   2. Anemia, unspecified type  D64.9 285.9   3. Essential hypertension  I10 401.9   4. Hyperlipidemia, unspecified hyperlipidemia type  E78.5 272.4   5. Congestive heart failure, unspecified HF chronicity, unspecified heart failure type  I50.9 428.0        Plan:     1. Well adult exam    2. Anemia, unspecified type  -     CBC Auto Differential; Future; Expected date: 12/04/2023    3. Essential hypertension  Assessment & Plan:  Well controlled.   Continue Aldactone 12.5 mg + Entresto 49-51 BID.   Low Sodium Diet (DASH Diet - Less than 2 grams of sodium per day).  Monitor blood pressure daily and log. Report consistent numbers greater than 140/90.  Maintain healthy weight with goal BMI <30. Exercise 30 minutes per day, 5 days per week.      4. Hyperlipidemia, unspecified hyperlipidemia type  Assessment & Plan:  Lab Results   Component Value Date    LDL 70.00 10/30/2023    TRIG 98 10/30/2023    TRIG 103 04/25/2023    HDL 63 (H) 10/30/2023    HDL 69 04/25/2023    TOTALCHOLEST 2 10/30/2023     Stable.   Continue atorvastatin 40 mg daily.   Stressed importance of dietary modifications. Follow a low cholesterol, low saturated fat diet with less that 200mg of cholesterol a day.  Avoid fried foods and high saturated fats (high saturated fats less than 7% of calories).  Add Flax Seed/Fish Oil supplements to diet. Increase dietary fiber.  Regular exercise can reduce LDL and raise HDL. Stressed importance of physical activity 5 times per week for 30 minutes per day.         5. Congestive heart failure, unspecified HF chronicity, unspecified heart failure type  Assessment & Plan:  Stable from cardiology standpoint.     Continue Ranexa 500 mg BID + Lasix 20 mg + Entresto 49-51 BID+ amiodarone 200 mg BID.     Call PCP or seek emergency services if you have these symptoms:   Increasing or new shortness of  breath  New or worsened cough, especially if you are coughing up frothy or bloody material  An increase in leg or ankle swelling  Weight gain of 2 to 3 pounds (1 kilogram) in one day or five pounds (2 kg) in one week  A fast or irregular heartbeat           The following assessments were completed and reviewed. See completed screening forms and assessments within the Encounter Summary.  [x] Health Risk Assessment   [x] CVD Risk Factors - Reviewed  [x] Obesity/Physical Activity -  Encouraged daily 30 minute physical activity x 5 days per week.   [x] Home Safety/Living Situation  [x] CAGE  [x] Depression (PHQ) Screen  [x] Timed Get Up and Go  [x] Whisper Test  [x] Cognitive Function/Impairment Screen  [x] Nutrition Screening  [x] ADL Screen  [x] Opioid Screen:  [x] Patient does not have a prescription for opioids.   [] Patient has a prescription for opioids but is at low risk for abuse.   [x] Substance Abuse Screen:   [x] Patient does not use substances.   [x] Advanced Care Planning:  Advance Care Planning   Date: 12/04/2023    Living Will  During this visit, I engaged the patient  in the voluntary advance care planning process.  The patient and I reviewed the role for advance directives and their purpose in directing future healthcare if the patient's unable to speak for him/herself.  At this point in time, the patient does have full decision-making capacity.  We discussed different extreme health states that he could experience, and reviewed what kind of medical care he would want in those situations.  The patient communicated that if he were comatose and had little chance of a meaningful recovery, he would not want machines/life-sustaining treatments used.  I spent a total of 5 minutes engaging the patient in this advance care planning discussion.              Provided patient with a 5-10 year written screening schedule and personal prevention plan. Recommendations were developed using the USPSTF age appropriate  recommendations. Education, counseling, and referrals were provided as needed. After Visit Summary printed and given to patient, which includes a list of additional screenings\tests needed.    Follow up in about 6 months (around 6/4/2024) for Follow Up. In addition to their scheduled follow up, the patient has also been instructed to follow up on as needed basis.     Yanique Ramirez NP

## 2023-12-04 NOTE — ASSESSMENT & PLAN NOTE
Lab Results   Component Value Date    LDL 70.00 10/30/2023    TRIG 98 10/30/2023    TRIG 103 04/25/2023    HDL 63 (H) 10/30/2023    HDL 69 04/25/2023    TOTALCHOLEST 2 10/30/2023     Stable.   Continue atorvastatin 40 mg daily.   Stressed importance of dietary modifications. Follow a low cholesterol, low saturated fat diet with less that 200mg of cholesterol a day.  Avoid fried foods and high saturated fats (high saturated fats less than 7% of calories).  Add Flax Seed/Fish Oil supplements to diet. Increase dietary fiber.  Regular exercise can reduce LDL and raise HDL. Stressed importance of physical activity 5 times per week for 30 minutes per day.

## 2023-12-04 NOTE — ASSESSMENT & PLAN NOTE
Stable from cardiology standpoint.     Continue Ranexa 500 mg BID + Lasix 20 mg + Entresto 49-51 BID+ amiodarone 200 mg BID.     Call PCP or seek emergency services if you have these symptoms:   Increasing or new shortness of breath  New or worsened cough, especially if you are coughing up frothy or bloody material  An increase in leg or ankle swelling  Weight gain of 2 to 3 pounds (1 kilogram) in one day or five pounds (2 kg) in one week  A fast or irregular heartbeat

## 2023-12-11 ENCOUNTER — HOSPITAL ENCOUNTER (EMERGENCY)
Facility: HOSPITAL | Age: 83
Discharge: HOME | End: 2023-12-11
Attending: FAMILY MEDICINE
Payer: MEDICARE

## 2023-12-11 VITALS
TEMPERATURE: 97 F | SYSTOLIC BLOOD PRESSURE: 142 MMHG | RESPIRATION RATE: 16 BRPM | HEART RATE: 68 BPM | WEIGHT: 200 LBS | OXYGEN SATURATION: 99 % | DIASTOLIC BLOOD PRESSURE: 66 MMHG | HEIGHT: 74 IN | BODY MASS INDEX: 25.67 KG/M2

## 2023-12-11 DIAGNOSIS — R07.89 ATYPICAL CHEST PAIN: Primary | ICD-10-CM

## 2023-12-11 DIAGNOSIS — R07.9 CHEST PAIN: ICD-10-CM

## 2023-12-11 LAB
ALBUMIN SERPL-MCNC: 4.1 G/DL (ref 3.4–4.8)
ALBUMIN/GLOB SERPL: 1.2 RATIO (ref 1.1–2)
ALP SERPL-CCNC: 76 UNIT/L (ref 40–150)
ALT SERPL-CCNC: 35 UNIT/L (ref 0–55)
AST SERPL-CCNC: 34 UNIT/L (ref 5–34)
BASOPHILS # BLD AUTO: 0.02 X10(3)/MCL
BASOPHILS NFR BLD AUTO: 0.3 %
BILIRUB SERPL-MCNC: 0.6 MG/DL
BNP BLD-MCNC: 286.8 PG/ML
BUN SERPL-MCNC: 15 MG/DL (ref 8.4–25.7)
CALCIUM SERPL-MCNC: 9.1 MG/DL (ref 8.8–10)
CHLORIDE SERPL-SCNC: 98 MMOL/L (ref 98–107)
CK MB SERPL-MCNC: 2.5 NG/ML
CK SERPL-CCNC: 69 U/L (ref 30–200)
CO2 SERPL-SCNC: 26 MMOL/L (ref 23–31)
CREAT SERPL-MCNC: 1.11 MG/DL (ref 0.73–1.18)
EOSINOPHIL # BLD AUTO: 0.1 X10(3)/MCL (ref 0–0.9)
EOSINOPHIL NFR BLD AUTO: 1.5 %
ERYTHROCYTE [DISTWIDTH] IN BLOOD BY AUTOMATED COUNT: 12.2 % (ref 11.5–17)
GFR SERPLBLD CREATININE-BSD FMLA CKD-EPI: >60 MLS/MIN/1.73/M2
GLOBULIN SER-MCNC: 3.4 GM/DL (ref 2.4–3.5)
GLUCOSE SERPL-MCNC: 92 MG/DL (ref 82–115)
HCT VFR BLD AUTO: 43.1 % (ref 42–52)
HGB BLD-MCNC: 14.2 G/DL (ref 14–18)
IMM GRANULOCYTES # BLD AUTO: 0.01 X10(3)/MCL (ref 0–0.04)
IMM GRANULOCYTES NFR BLD AUTO: 0.2 %
LYMPHOCYTES # BLD AUTO: 1.94 X10(3)/MCL (ref 0.6–4.6)
LYMPHOCYTES NFR BLD AUTO: 30 %
MCH RBC QN AUTO: 31.8 PG (ref 27–31)
MCHC RBC AUTO-ENTMCNC: 32.9 G/DL (ref 33–36)
MCV RBC AUTO: 96.6 FL (ref 80–94)
MONOCYTES # BLD AUTO: 0.66 X10(3)/MCL (ref 0.1–1.3)
MONOCYTES NFR BLD AUTO: 10.2 %
NEUTROPHILS # BLD AUTO: 3.73 X10(3)/MCL (ref 2.1–9.2)
NEUTROPHILS NFR BLD AUTO: 57.8 %
NRBC BLD AUTO-RTO: 0 %
PLATELET # BLD AUTO: 261 X10(3)/MCL (ref 130–400)
PMV BLD AUTO: 9.5 FL (ref 7.4–10.4)
POTASSIUM SERPL-SCNC: 4.5 MMOL/L (ref 3.5–5.1)
PROT SERPL-MCNC: 7.5 GM/DL (ref 5.8–7.6)
RBC # BLD AUTO: 4.46 X10(6)/MCL (ref 4.7–6.1)
SODIUM SERPL-SCNC: 134 MMOL/L (ref 136–145)
TROPONIN I SERPL-MCNC: 0.01 NG/ML (ref 0–0.04)
TROPONIN I SERPL-MCNC: <0.01 NG/ML (ref 0–0.04)
WBC # SPEC AUTO: 6.46 X10(3)/MCL (ref 4.5–11.5)

## 2023-12-11 PROCEDURE — 82550 ASSAY OF CK (CPK): CPT | Performed by: FAMILY MEDICINE

## 2023-12-11 PROCEDURE — 25000003 PHARM REV CODE 250: Performed by: FAMILY MEDICINE

## 2023-12-11 PROCEDURE — 83880 ASSAY OF NATRIURETIC PEPTIDE: CPT | Performed by: FAMILY MEDICINE

## 2023-12-11 PROCEDURE — 99285 EMERGENCY DEPT VISIT HI MDM: CPT | Mod: 25

## 2023-12-11 PROCEDURE — 84484 ASSAY OF TROPONIN QUANT: CPT | Performed by: FAMILY MEDICINE

## 2023-12-11 PROCEDURE — 80053 COMPREHEN METABOLIC PANEL: CPT | Performed by: FAMILY MEDICINE

## 2023-12-11 PROCEDURE — 85025 COMPLETE CBC W/AUTO DIFF WBC: CPT | Performed by: FAMILY MEDICINE

## 2023-12-11 PROCEDURE — 93005 ELECTROCARDIOGRAM TRACING: CPT

## 2023-12-11 PROCEDURE — 82553 CREATINE MB FRACTION: CPT | Performed by: FAMILY MEDICINE

## 2023-12-11 RX ORDER — NAPROXEN SODIUM 220 MG/1
81 TABLET, FILM COATED ORAL
Status: COMPLETED | OUTPATIENT
Start: 2023-12-11 | End: 2023-12-11

## 2023-12-11 RX ADMIN — ASPIRIN 81 MG CHEWABLE TABLET 81 MG: 81 TABLET CHEWABLE at 11:12

## 2023-12-11 NOTE — ED PROVIDER NOTES
Encounter Date: 12/11/2023       History     Chief Complaint   Patient presents with    Chest Pain     Chest pain since 2 am, unrelieved by 1 nitrogylcerin SL.  States feels like his last heart attack.       This patient is an 83-year-old male who states that he was having chest pain since 2:00 a.m. this morning.  Patient states that the pain was different than reflux and similar to pain when he 1st had his very 1st heart attack in 2003.  Patient actually has no pain at this time, when I go into see him.  He states he forgot to take his 81 mg aspirin which is given to him in the emergency room.    The history is provided by the patient.     Review of patient's allergies indicates:  No Known Allergies  Past Medical History:   Diagnosis Date    CAD (coronary artery disease)     Cancer     Cataract     Congestive heart failure (CHF)     Coronary artery disease     Elevated LFTs     HLD (hyperlipidemia)     HTN (hypertension)     Hypothyroidism     Liver disease     Mitral valve regurgitation     Myocardial infarction     S/P CABG (coronary artery bypass graft)     Squamous cell carcinoma in situ     Thyroid disease     Tremor     Tricuspid valve regurgitation     Ventricular tachycardia      Past Surgical History:   Procedure Laterality Date    ANKLE ARTHROSCOPY W/ OPEN REPAIR Left     BILATERAL INGUINAL HERNIA REPAIR  01/04/2019    Dr Brian Catherine    CARDIAC PACEMAKER PLACEMENT  12/23/2019    CATARACT EXTRACTION W/  INTRAOCULAR LENS IMPLANT      CORONARY ANGIOPLASTY WITH STENT PLACEMENT  04/22/2002    Dr Hans Moran    CORONARY ANGIOPLASTY WITH STENT PLACEMENT  06/16/2003    Dr Hans Moran    CORONARY ARTERY BYPASS GRAFT      EXCISION OF LESION Right 05/27/2022    Dr Gwendolyn Joel MD    EYE SURGERY      SQUAMOUS CELL CARCINOMA EXCISION Left 02/20/2018    ear - Dr Mert Ordaz     Family History   Problem Relation Age of Onset    Hypertension Mother     Hypertension Father     Heart attack Father     Heart attack  Sister     Heart attack Sister     Heart attack Sister      Social History     Tobacco Use    Smoking status: Former     Types: Cigars    Smokeless tobacco: Never   Substance Use Topics    Alcohol use: Not Currently    Drug use: Never     Review of Systems   Constitutional: Negative.    HENT: Negative.     Cardiovascular:  Positive for chest pain.   Endocrine: Negative.    Neurological: Negative.    Psychiatric/Behavioral: Negative.     All other systems reviewed and are negative.      Physical Exam     Initial Vitals   BP Pulse Resp Temp SpO2   12/11/23 1014 12/11/23 1014 12/11/23 1014 12/11/23 1014 12/11/23 1019   (!) 185/99 86 20 96.8 °F (36 °C) 99 %      MAP       --                Physical Exam    Nursing note and vitals reviewed.  Constitutional: He appears well-developed and well-nourished.   HENT:   Head: Normocephalic.   Eyes: Pupils are equal, round, and reactive to light.   Neck:   Normal range of motion.  Cardiovascular:  Normal rate and regular rhythm.           Initial chest pain was substernal.  Pain is resolved now   Pulmonary/Chest: Breath sounds normal.   Abdominal: Abdomen is soft. Bowel sounds are normal.   Musculoskeletal:         General: Normal range of motion.      Cervical back: Normal range of motion.     Neurological: He is alert and oriented to person, place, and time.   Skin: Skin is warm and dry.   Psychiatric: He has a normal mood and affect.         ED Course   Procedures  Labs Reviewed   COMPREHENSIVE METABOLIC PANEL - Abnormal; Notable for the following components:       Result Value    Sodium Level 134 (*)     All other components within normal limits   B-TYPE NATRIURETIC PEPTIDE - Abnormal; Notable for the following components:    Natriuretic Peptide 286.8 (*)     All other components within normal limits   CBC WITH DIFFERENTIAL - Abnormal; Notable for the following components:    RBC 4.46 (*)     MCV 96.6 (*)     MCH 31.8 (*)     MCHC 32.9 (*)     All other components within  normal limits   TROPONIN I - Normal   CK-MB - Normal   CK - Normal   TROPONIN I - Normal   CBC W/ AUTO DIFFERENTIAL    Narrative:     The following orders were created for panel order CBC auto differential.  Procedure                               Abnormality         Status                     ---------                               -----------         ------                     CBC with Differential[[id number]]       Abnormal            Final result                 Please view results for these tests on the individual orders.     EKG Readings: (Independently Interpreted)   Initial Reading: No STEMI. Rhythm: Paced Rhythm. Heart Rate: 69. Ectopy: No Ectopy. Conduction: Normal. ST Segments: Non-Specific ST Segment Depression. Clinical Impression: Paced Rhythm Other Impression: unchangd from 7/26/22     ECG Results              EKG 12-lead (Final result)  Result time 12/11/23 12:16:27      Final result by Interface, Lab In [facility] (12/11/23 12:16:27)                   Narrative:    Test Reason : R07.9,    Vent. Rate : 093 BPM     Atrial Rate : 093 BPM     P-R Int : 402 ms          QRS Dur : 118 ms      QT Int : 390 ms       P-R-T Axes : 000 000 008 degrees     QTc Int : 484 ms    Atrial-paced rhythm with prolonged AV conduction  Inferior infarct  Abnormal ECG  Confirmed by Mohamud Mcdowell MD ([id number]) on 12/11/2023 12:16:15 PM    Referred By: [email or web address]   SELF           Confirmed By:Mohamud Mcdowell MD                                  Imaging Results              X-Ray Chest AP Portable (Final result)  Result time 12/11/23 10:40:26      Final result by Mark Phelps MD (12/11/23 10:40:26)                   Impression:      As above.      Electronically signed by: Mark Phelps  Date:    12/11/2023  Time:    10:40               Narrative:    EXAMINATION:  XR CHEST AP PORTABLE    CLINICAL HISTORY:  Chest Pain;    TECHNIQUE:  Single view of the chest    COMPARISON:  07/26/2022    FINDINGS:  Postsurgical changes of median  sternotomy left-sided cardiac device.  No pleural effusion or pneumothorax.  No acute osseous abnormality.                                       Medications   aspirin chewable tablet 81 mg (81 mg Oral Given 12/11/23 1143)     Medical Decision Making  This patient is an 83-year-old male who comes in with chest pain that has been going on since 2:00 a.m..  Patient states that the pain is actually now subsided.  Concerned because he has had several heart attacks in the past.  Differential diagnosis: MI, reflux, costochondritis,    Amount and/or Complexity of Data Reviewed  Labs: ordered.     Details: Initial labs show a sodium of 134, the rest of CMP is normal, BNP is slightly high at 286, the rest of the cardiac enzymes are normal.  Patient's CBC is normal.  After 2 hours repeat troponin is done in his 0.011 which is normal.  Radiology: ordered.     Details: Chest x-ray shows nothing acute.                                      Clinical Impression:  Final diagnoses:  [R07.9] Chest pain  [R07.89] Atypical chest pain (Primary)          ED Disposition Condition    Discharge Stable          ED Prescriptions    None       Follow-up Information       Follow up With Specialties Details Why Contact Info    Avila Johnson, DO Family Medicine In 2 days  [address] [facility] [address]  [phone]               Chang Cerrato MD  12/11/23 1138       Chang Cerrato MD  12/11/23 8543

## 2024-02-01 ENCOUNTER — OFFICE VISIT (OUTPATIENT)
Dept: FAMILY MEDICINE | Facility: CLINIC | Age: 84
End: 2024-02-01
Payer: MEDICARE

## 2024-02-01 VITALS
WEIGHT: 216 LBS | BODY MASS INDEX: 27.72 KG/M2 | RESPIRATION RATE: 20 BRPM | HEIGHT: 74 IN | TEMPERATURE: 98 F | SYSTOLIC BLOOD PRESSURE: 98 MMHG | DIASTOLIC BLOOD PRESSURE: 57 MMHG | HEART RATE: 106 BPM

## 2024-02-01 DIAGNOSIS — S50.811A ABRASION OF RIGHT FOREARM, INITIAL ENCOUNTER: Primary | ICD-10-CM

## 2024-02-01 PROCEDURE — 90715 TDAP VACCINE 7 YRS/> IM: CPT | Mod: ,,, | Performed by: NURSE PRACTITIONER

## 2024-02-01 PROCEDURE — 99214 OFFICE O/P EST MOD 30 MIN: CPT | Mod: ,,, | Performed by: NURSE PRACTITIONER

## 2024-02-01 PROCEDURE — 90471 IMMUNIZATION ADMIN: CPT | Mod: ,,, | Performed by: NURSE PRACTITIONER

## 2024-02-01 RX ORDER — SILVER SULFADIAZINE 10 G/1000G
CREAM TOPICAL DAILY
Qty: 1 EACH | Refills: 0 | Status: SHIPPED | OUTPATIENT
Start: 2024-02-01 | End: 2024-02-08

## 2024-02-01 NOTE — PROGRESS NOTES
"Subjective:       Patient ID: Adal Phoenix is a 83 y.o. male.    Chief Complaint:  Abrasion to right arm forearm    Patient with a abrasion to right forearm that is optimally 4 cm x 5 cm circular.  Patient states he scraped the top of his arm with a toolbox.  Patient cleanse the wound and applied dressing.  Patient presents today who the.      Review of Iiorsmb37 point review of systems conducted, negative except as stated in the history of present illness. See HPI for details.      Objective:      Visit Vitals  BP (!) 98/57   Pulse 106   Temp 97.9 °F (36.6 °C)   Resp 20   Ht 6' 2" (1.88 m)   Wt 98 kg (216 lb)   BMI 27.73 kg/m²     Physical Exam  Vitals reviewed.   HENT:      Head: Normocephalic.      Nose: Nose normal.      Mouth/Throat:      Mouth: Mucous membranes are moist.   Eyes:      Extraocular Movements: Extraocular movements intact.   Cardiovascular:      Rate and Rhythm: Normal rate and regular rhythm.      Pulses: Normal pulses.      Heart sounds: Normal heart sounds.   Pulmonary:      Effort: Pulmonary effort is normal.      Breath sounds: Normal breath sounds.   Abdominal:      General: Bowel sounds are normal.      Palpations: Abdomen is soft.   Musculoskeletal:         General: Normal range of motion.      Cervical back: Normal range of motion and neck supple.   Skin:     General: Skin is warm and dry.             Comments: Right forearm open wound   Neurological:      Mental Status: He is alert and oriented to person, place, and time.       Current Outpatient Medications   Medication Instructions    amiodarone (PACERONE) 200 mg, Oral, 2 times daily    aspirin 81 mg, Oral, Daily    atorvastatin (LIPITOR) 40 mg, Oral, Nightly    ENTRESTO 49-51 mg per tablet 1 tablet, Oral, 2 times daily    furosemide (LASIX) 20 mg, Oral, Daily    levothyroxine (SYNTHROID) 125 mcg, Oral, Every morning    nitroGLYCERIN (NITROSTAT) 0.4 mg, Sublingual, Every 5 min PRN    ranolazine (RANEXA) 500 mg, Oral, 2 times daily    " silver sulfADIAZINE 1% (SILVADENE) 1 % cream Topical (Top), Daily, Thin coat    spironolactone (ALDACTONE) 12.5 mg, Oral, Daily     has No Known Allergies.       Assessment:         ICD-10-CM ICD-9-CM   1. Abrasion of right forearm, initial encounter  S50.811A 913.0          Plan:       1. Abrasion of right forearm, initial encounter  Wound care performed as follows:  Cleanse with NS, pat dry, apply Adaptic, 4x4s, roll gauze and secure with tape  - Tdap (BOOSTRIX) vaccine injection 0.5 mL given in the right deltoid by Dora Samaniego LPN  - silver sulfADIAZINE 1% (SILVADENE) 1 % cream; Apply topically once daily. Thin coat for 7 days  Dispense: 1 each; Refill: 0  Wound care instructions given the patient  Patient instructed to watch for redness, swelling,  increased warmth or any other signs and symptoms of infection.  Call the office with concerns          Follow up if symptoms worsen or fail to improve.     Future Appointments   Date Time Provider Department Center   2/1/2024  4:00 PM Nadiya Gutierrez NP [facility]   6/4/2024 10:30 AM Avila Johnson DO [facility]

## 2024-04-11 ENCOUNTER — LAB VISIT (OUTPATIENT)
Dept: LAB | Facility: HOSPITAL | Age: 84
End: 2024-04-11
Attending: PATHOLOGY
Payer: MEDICARE

## 2024-04-11 DIAGNOSIS — D64.9 ANEMIA, UNSPECIFIED TYPE: ICD-10-CM

## 2024-04-11 DIAGNOSIS — Z79.899 ENCOUNTER FOR LONG-TERM (CURRENT) USE OF OTHER MEDICATIONS: Primary | ICD-10-CM

## 2024-04-11 LAB
ALBUMIN SERPL-MCNC: 4 G/DL (ref 3.4–4.8)
ALBUMIN/GLOB SERPL: 1.2 RATIO (ref 1.1–2)
ALP SERPL-CCNC: 88 UNIT/L (ref 40–150)
ALT SERPL-CCNC: 28 UNIT/L (ref 0–55)
AST SERPL-CCNC: 29 UNIT/L (ref 5–34)
BASOPHILS # BLD AUTO: 0.03 X10(3)/MCL
BASOPHILS NFR BLD AUTO: 0.4 %
BILIRUB SERPL-MCNC: 0.6 MG/DL
BUN SERPL-MCNC: 24 MG/DL (ref 8.4–25.7)
CALCIUM SERPL-MCNC: 9.3 MG/DL (ref 8.8–10)
CHLORIDE SERPL-SCNC: 104 MMOL/L (ref 98–107)
CO2 SERPL-SCNC: 27 MMOL/L (ref 23–31)
CREAT SERPL-MCNC: 1.42 MG/DL (ref 0.73–1.18)
EOSINOPHIL # BLD AUTO: 0.17 X10(3)/MCL (ref 0–0.9)
EOSINOPHIL NFR BLD AUTO: 2.4 %
ERYTHROCYTE [DISTWIDTH] IN BLOOD BY AUTOMATED COUNT: 12.2 % (ref 11.5–17)
GFR SERPLBLD CREATININE-BSD FMLA CKD-EPI: 50 MLS/MIN/1.73/M2
GLOBULIN SER-MCNC: 3.3 GM/DL (ref 2.4–3.5)
GLUCOSE SERPL-MCNC: 101 MG/DL (ref 82–115)
HCT VFR BLD AUTO: 43.8 % (ref 42–52)
HGB BLD-MCNC: 14.1 G/DL (ref 14–18)
IMM GRANULOCYTES # BLD AUTO: 0.03 X10(3)/MCL (ref 0–0.04)
IMM GRANULOCYTES NFR BLD AUTO: 0.4 %
LYMPHOCYTES # BLD AUTO: 1.8 X10(3)/MCL (ref 0.6–4.6)
LYMPHOCYTES NFR BLD AUTO: 25.2 %
MCH RBC QN AUTO: 30.9 PG (ref 27–31)
MCHC RBC AUTO-ENTMCNC: 32.2 G/DL (ref 33–36)
MCV RBC AUTO: 95.8 FL (ref 80–94)
MONOCYTES # BLD AUTO: 0.71 X10(3)/MCL (ref 0.1–1.3)
MONOCYTES NFR BLD AUTO: 9.9 %
NEUTROPHILS # BLD AUTO: 4.41 X10(3)/MCL (ref 2.1–9.2)
NEUTROPHILS NFR BLD AUTO: 61.7 %
NRBC BLD AUTO-RTO: 0 %
PLATELET # BLD AUTO: 253 X10(3)/MCL (ref 130–400)
PMV BLD AUTO: 9.5 FL (ref 7.4–10.4)
POTASSIUM SERPL-SCNC: 5.3 MMOL/L (ref 3.5–5.1)
PROT SERPL-MCNC: 7.3 GM/DL (ref 5.8–7.6)
RBC # BLD AUTO: 4.57 X10(6)/MCL (ref 4.7–6.1)
SODIUM SERPL-SCNC: 140 MMOL/L (ref 136–145)
WBC # SPEC AUTO: 7.15 X10(3)/MCL (ref 4.5–11.5)

## 2024-04-11 PROCEDURE — 85025 COMPLETE CBC W/AUTO DIFF WBC: CPT

## 2024-04-11 PROCEDURE — 80053 COMPREHEN METABOLIC PANEL: CPT

## 2024-04-11 PROCEDURE — 36415 COLL VENOUS BLD VENIPUNCTURE: CPT

## 2024-04-15 ENCOUNTER — TELEPHONE (OUTPATIENT)
Dept: FAMILY MEDICINE | Facility: CLINIC | Age: 84
End: 2024-04-15
Payer: MEDICARE

## 2024-04-15 NOTE — TELEPHONE ENCOUNTER
----- Message from Yanique Ramirez NP sent at 4/15/2024  8:17 AM CDT -----  Please inform patient of lab results.     1. Anemia stable.    2. Potassium slightly elevated. Decrease consumption of potassium rich foods and potassium supplements.     3. Kidney function decreased, but stable.     4. All other labs within normal ranges.     Thanks for all you do,   Yanique

## 2024-04-30 DIAGNOSIS — E03.9 HYPOTHYROIDISM, UNSPECIFIED TYPE: ICD-10-CM

## 2024-04-30 RX ORDER — LEVOTHYROXINE SODIUM 125 UG/1
TABLET ORAL
Qty: 30 TABLET | Refills: 6 | Status: SHIPPED | OUTPATIENT
Start: 2024-04-30

## 2024-05-08 ENCOUNTER — LAB VISIT (OUTPATIENT)
Dept: LAB | Facility: HOSPITAL | Age: 84
End: 2024-05-08
Attending: INTERNAL MEDICINE
Payer: MEDICARE

## 2024-05-08 DIAGNOSIS — E78.5 HYPERLIPIDEMIA, UNSPECIFIED HYPERLIPIDEMIA TYPE: ICD-10-CM

## 2024-05-08 DIAGNOSIS — I10 HYPERTENSION, UNSPECIFIED TYPE: ICD-10-CM

## 2024-05-08 DIAGNOSIS — I25.10 CORONARY ATHEROSCLEROSIS OF NATIVE CORONARY ARTERY: Primary | ICD-10-CM

## 2024-05-08 LAB
ALBUMIN SERPL-MCNC: 3.7 G/DL (ref 3.4–4.8)
ALBUMIN/GLOB SERPL: 1.2 RATIO (ref 1.1–2)
ALP SERPL-CCNC: 95 UNIT/L (ref 40–150)
ALT SERPL-CCNC: 45 UNIT/L (ref 0–55)
AST SERPL-CCNC: 39 UNIT/L (ref 5–34)
BILIRUB SERPL-MCNC: 0.5 MG/DL
BUN SERPL-MCNC: 16 MG/DL (ref 8.4–25.7)
CALCIUM SERPL-MCNC: 8.8 MG/DL (ref 8.8–10)
CHLORIDE SERPL-SCNC: 102 MMOL/L (ref 98–107)
CHOLEST SERPL-MCNC: 165 MG/DL
CHOLEST/HDLC SERPL: 3 {RATIO} (ref 0–5)
CO2 SERPL-SCNC: 30 MMOL/L (ref 23–31)
CREAT SERPL-MCNC: 1.28 MG/DL (ref 0.73–1.18)
GFR SERPLBLD CREATININE-BSD FMLA CKD-EPI: 56 ML/MIN/1.73/M2
GLOBULIN SER-MCNC: 3 GM/DL (ref 2.4–3.5)
GLUCOSE SERPL-MCNC: 83 MG/DL (ref 82–115)
HDLC SERPL-MCNC: 66 MG/DL (ref 35–60)
LDLC SERPL CALC-MCNC: 83 MG/DL (ref 50–140)
POTASSIUM SERPL-SCNC: 4.6 MMOL/L (ref 3.5–5.1)
PROT SERPL-MCNC: 6.7 GM/DL (ref 5.8–7.6)
SODIUM SERPL-SCNC: 141 MMOL/L (ref 136–145)
TRIGL SERPL-MCNC: 80 MG/DL (ref 34–140)
VLDLC SERPL CALC-MCNC: 16 MG/DL

## 2024-05-08 PROCEDURE — 80053 COMPREHEN METABOLIC PANEL: CPT

## 2024-05-08 PROCEDURE — 36415 COLL VENOUS BLD VENIPUNCTURE: CPT

## 2024-05-08 PROCEDURE — 80061 LIPID PANEL: CPT

## 2024-06-04 ENCOUNTER — OFFICE VISIT (OUTPATIENT)
Dept: FAMILY MEDICINE | Facility: CLINIC | Age: 84
End: 2024-06-04
Payer: MEDICARE

## 2024-06-04 VITALS
BODY MASS INDEX: 27.73 KG/M2 | SYSTOLIC BLOOD PRESSURE: 110 MMHG | DIASTOLIC BLOOD PRESSURE: 79 MMHG | HEART RATE: 56 BPM | RESPIRATION RATE: 18 BRPM | HEIGHT: 74 IN | TEMPERATURE: 97 F | OXYGEN SATURATION: 96 %

## 2024-06-04 DIAGNOSIS — R42 DIZZINESS: ICD-10-CM

## 2024-06-04 DIAGNOSIS — I50.22 CHRONIC HFREF (HEART FAILURE WITH REDUCED EJECTION FRACTION): Primary | ICD-10-CM

## 2024-06-04 PROBLEM — I20.9 ANGINA PECTORIS, UNSPECIFIED: Status: ACTIVE | Noted: 2022-08-01

## 2024-06-04 PROBLEM — N40.1 BENIGN PROSTATIC HYPERPLASIA WITH LOWER URINARY TRACT SYMPTOMS: Status: ACTIVE | Noted: 2022-08-01

## 2024-06-04 PROBLEM — K21.9 GASTRO-ESOPHAGEAL REFLUX DISEASE WITHOUT ESOPHAGITIS: Status: ACTIVE | Noted: 2022-08-01

## 2024-06-04 PROBLEM — K59.00 CONSTIPATION, UNSPECIFIED: Status: ACTIVE | Noted: 2022-08-01

## 2024-06-04 PROCEDURE — 99214 OFFICE O/P EST MOD 30 MIN: CPT | Mod: ,,, | Performed by: FAMILY MEDICINE

## 2024-06-04 RX ORDER — TERBINAFINE HYDROCHLORIDE 250 MG/1
250 TABLET ORAL DAILY
COMMUNITY
Start: 2024-05-21

## 2024-06-04 RX ORDER — EZETIMIBE 10 MG/1
10 TABLET ORAL DAILY
COMMUNITY
Start: 2024-05-15

## 2024-06-04 NOTE — PROGRESS NOTES
Patient ID: [id number]     Chief Complaint: 3 month    HPI:     Adal Phoenix is a 83 y.o. male here today for 3 month for chronic conditions including Chronic heart failure with reduced ejection fraction. Describes dizziness intermittently throughout the week. He describes the episodes as occurring randomly and lasting for several hours.     -------------------------------------    CAD (coronary artery disease)    Cancer    Cataract    Congestive heart failure (CHF)    Coronary artery disease    Elevated LFTs    HLD (hyperlipidemia)    HTN (hypertension)    Hypothyroidism    Liver disease    Mitral valve regurgitation    Myocardial infarction    S/P CABG (coronary artery bypass graft)    Squamous cell carcinoma in situ    Thyroid disease    Tremor    Tricuspid valve regurgitation    Ventricular tachycardia        Past Surgical History:   Procedure Laterality Date    ANKLE ARTHROSCOPY W/ OPEN REPAIR Left     BILATERAL INGUINAL HERNIA REPAIR  01/04/2019    Dr Brian Catherine    CARDIAC PACEMAKER PLACEMENT  12/23/2019    CATARACT EXTRACTION W/  INTRAOCULAR LENS IMPLANT      CORONARY ANGIOPLASTY WITH STENT PLACEMENT  04/22/2002    Dr Hans Moran    CORONARY ANGIOPLASTY WITH STENT PLACEMENT  06/16/2003    Dr Hans Moran    CORONARY ARTERY BYPASS GRAFT      EXCISION OF LESION Right 05/27/2022    Dr Gwendolyn Joel MD    EYE SURGERY      SQUAMOUS CELL CARCINOMA EXCISION Left 02/20/2018    ear - Dr Mert Ordaz       Review of patient's allergies indicates:  No Known Allergies    Outpatient Medications Marked as Taking for the 6/4/24 encounter (Office Visit) with Avila Johnson,    Medication Sig Dispense Refill    amiodarone (PACERONE) 200 MG Tab Take 200 mg by mouth 2 (two) times a day.      aspirin 81 MG Chew Take 81 mg by mouth Daily.      atorvastatin (LIPITOR) 40 MG tablet Take 40 mg by mouth nightly.      ENTRESTO 49-51 mg per tablet Take 1 tablet by mouth 2 (two) times daily.      ezetimibe (ZETIA) 10 mg tablet  Take 10 mg by mouth once daily.      furosemide (LASIX) 20 MG tablet Take 20 mg by mouth once daily.      levothyroxine (SYNTHROID) 125 MCG tablet TAKE 1 TABLET BY MOUTH EVERY MORNING ON AN EMPTY STOMACH 1/2 HR TO 1 HR BEFORE BREAKFAST. 30 tablet 6    ranolazine (RANEXA) 500 MG Tb12 Take 500 mg by mouth 2 (two) times a day.      spironolactone (ALDACTONE) 25 MG tablet Take 12.5 mg by mouth Daily.      terbinafine HCL (LAMISIL) 250 mg tablet Take 250 mg by mouth once daily.         Social History     Socioeconomic History    Marital status:    Tobacco Use    Smoking status: Former     Types: Cigars     Passive exposure: Past    Smokeless tobacco: Never   Substance and Sexual Activity    Alcohol use: Not Currently    Drug use: Never    Sexual activity: Not Currently     Social Determinants of Health     Financial Resource Strain: Patient Declined (5/29/2024)    Overall Financial Resource Strain (CARDIA)     Difficulty of Paying Living Expenses: Patient declined   Food Insecurity: No Food Insecurity (5/29/2024)    Hunger Vital Sign     Worried About Running Out of Food in the Last Year: Never true     Ran Out of Food in the Last Year: Never true   Transportation Needs: No Transportation Needs (8/29/2023)    PRAPARE - Transportation     Lack of Transportation (Medical): No     Lack of Transportation (Non-Medical): No   Physical Activity: Sufficiently Active (5/29/2024)    Exercise Vital Sign     Days of Exercise per Week: 6 days     Minutes of Exercise per Session: 30 min   Stress: No Stress Concern Present (5/29/2024)    [employer] [place] [employer] - Occupational Stress Questionnaire     Feeling of Stress : Only a little   Housing Stability: Low Risk  (8/29/2023)    Housing Stability Vital Sign     Unable to Pay for Housing in the Last Year: No     Number of Places Lived in the Last Year: 1     Unstable Housing in the Last Year: No        Family History   Problem Relation Name Age of Onset     "Hypertension Mother      Hypertension Father      Heart attack Father      Heart attack Sister      Heart attack Sister      Heart attack Sister          Patient Care Team:  Avila Johnson DO as PCP - General (Family Medicine)  Sathish Lockwood MD as Consulting Physician (Cardiology)  Romaine Simmons MD as Consulting Physician (Cardiology)  Chuckie Jackson FNP (Dermatology)  Federico Cruz MD (Dermatology)     Subjective:     Review of Systems   Constitutional:  Negative for chills and fever.   Respiratory:  Negative for shortness of breath.    Cardiovascular:  Negative for chest pain.   Gastrointestinal:  Negative for constipation and diarrhea.   Neurological:  Positive for dizziness. Negative for headaches.   Psychiatric/Behavioral:  The patient does not have insomnia.        See HPI for details  All Other ROS: Negative except as stated in HPI.       Objective:     /79 (BP Location: Left arm, Patient Position: Sitting, BP Method: Large (Automatic))   Pulse (!) 56   Temp 97.1 °F (36.2 °C)   Resp 18   Ht 6' 2" (1.88 m)   SpO2 96%   BMI 27.73 kg/m²     Physical Exam  Vitals reviewed.   Constitutional:       General: He is not in acute distress.     Appearance: Normal appearance. He is not ill-appearing.   Cardiovascular:      Rate and Rhythm: Normal rate and regular rhythm.      Pulses: Normal pulses.      Heart sounds: Normal heart sounds. No murmur heard.     No friction rub. No gallop.   Pulmonary:      Effort: No respiratory distress.      Breath sounds: No wheezing, rhonchi or rales.   Musculoskeletal:         General: No swelling.      Right lower leg: No edema.      Left lower leg: No edema.   Skin:     General: Skin is warm and dry.   Neurological:      General: No focal deficit present.      Mental Status: He is alert.   Psychiatric:         Mood and Affect: Mood normal.         Behavior: Behavior normal.         Assessment/Plan:     1. Chronic HFrEF (heart failure with reduced ejection " fraction)  -Currently followed by Cardiology. On Amiodarone 200mg BID, entresto 49-51mg BID, furosemide 20mg daily, ranexa 500mg BID, spironolactone 12.5mg daily.   2. Dizziness  Unclear etiology. Possibly orthostatic hypotension vs increased cardiac demand. Advised patient to keep a food diary to look for possible triggers (I.e. salt intake, caffeine intake, dehydration, heavy meals, etc).       Follow up:     Follow up in about 6 months (around 12/4/2024) for Medicare Wellness. In addition to their scheduled follow up, the patient has also been instructed to follow up on as needed basis.

## 2024-06-26 ENCOUNTER — HOSPITAL ENCOUNTER (OUTPATIENT)
Dept: RADIOLOGY | Facility: HOSPITAL | Age: 84
Discharge: HOME | End: 2024-06-26
Attending: FAMILY MEDICINE
Payer: MEDICARE

## 2024-06-26 ENCOUNTER — OFFICE VISIT (OUTPATIENT)
Dept: FAMILY MEDICINE | Facility: CLINIC | Age: 84
End: 2024-06-26
Payer: MEDICARE

## 2024-06-26 VITALS
HEIGHT: 74 IN | WEIGHT: 216 LBS | HEART RATE: 99 BPM | RESPIRATION RATE: 16 BRPM | BODY MASS INDEX: 27.72 KG/M2 | OXYGEN SATURATION: 98 % | SYSTOLIC BLOOD PRESSURE: 116 MMHG | TEMPERATURE: 98 F | DIASTOLIC BLOOD PRESSURE: 73 MMHG

## 2024-06-26 DIAGNOSIS — I20.9 ANGINA PECTORIS, UNSPECIFIED: ICD-10-CM

## 2024-06-26 DIAGNOSIS — R20.0 NUMBNESS AND TINGLING OF BOTH LEGS: Primary | ICD-10-CM

## 2024-06-26 DIAGNOSIS — R20.2 NUMBNESS AND TINGLING OF BOTH LEGS: ICD-10-CM

## 2024-06-26 DIAGNOSIS — R20.2 NUMBNESS AND TINGLING OF BOTH LEGS: Primary | ICD-10-CM

## 2024-06-26 DIAGNOSIS — R20.0 NUMBNESS AND TINGLING OF BOTH LEGS: ICD-10-CM

## 2024-06-26 DIAGNOSIS — R29.898 WEAKNESS OF BOTH LEGS: ICD-10-CM

## 2024-06-26 DIAGNOSIS — R42 DIZZINESS: ICD-10-CM

## 2024-06-26 PROCEDURE — 72100 X-RAY EXAM L-S SPINE 2/3 VWS: CPT | Mod: TC

## 2024-06-26 RX ORDER — FUROSEMIDE 40 MG/1
40 TABLET ORAL DAILY
COMMUNITY
Start: 2024-06-25

## 2024-06-26 NOTE — PROGRESS NOTES
Patient ID: [id number]     Chief Complaint: Dizziness (Off balance), Tremors (BLE tremors in certain positions, worse in right leg), and Numbness (Numbness BLE, has difficulty lifting feet to step now)    HPI:     Adal Phoenix is a 83 y.o. male here today for Dizziness (Off balance), Tremors (BLE tremors in certain positions, worse in right leg), and Numbness (Numbness BLE, has difficulty lifting feet to step now). Symptoms have been worsening over the last 3-4 weeks. He has a history of disc disease as noted on prior MRIs from over 5 years ago and was evaluated for Parkinson's by neurology around this time as well.     He reports tremors in his BLE making it difficult to control how much force he applies for the brake and accelerator while driving.     -------------------------------------    CAD (coronary artery disease)    Cancer    Cataract    Congestive heart failure (CHF)    Coronary artery disease    Elevated LFTs    HLD (hyperlipidemia)    HTN (hypertension)    Hypothyroidism    Liver disease    Mitral valve regurgitation    Myocardial infarction    S/P CABG (coronary artery bypass graft)    Squamous cell carcinoma in situ    Thyroid disease    Tremor    Tricuspid valve regurgitation    Ventricular tachycardia        Past Surgical History:   Procedure Laterality Date    ANKLE ARTHROSCOPY W/ OPEN REPAIR Left     BILATERAL INGUINAL HERNIA REPAIR  01/04/2019    Dr Brian Catherine    CARDIAC PACEMAKER PLACEMENT  12/23/2019    CATARACT EXTRACTION W/  INTRAOCULAR LENS IMPLANT      CORONARY ANGIOPLASTY WITH STENT PLACEMENT  04/22/2002    Dr Hans Moran    CORONARY ANGIOPLASTY WITH STENT PLACEMENT  06/16/2003    Dr Hans Moran    CORONARY ARTERY BYPASS GRAFT      EXCISION OF LESION Right 05/27/2022    Dr Gwendolyn Joel MD    EYE SURGERY      SQUAMOUS CELL CARCINOMA EXCISION Left 02/20/2018    ear - Dr Mert Ordaz       Review of patient's allergies indicates:  No Known Allergies    Outpatient Medications Marked as  Taking for the 6/26/24 encounter (Office Visit) with Avila Johnson, DO   Medication Sig Dispense Refill    amiodarone (PACERONE) 200 MG Tab Take 200 mg by mouth 2 (two) times a day.      aspirin 81 MG Chew Take 81 mg by mouth Daily.      atorvastatin (LIPITOR) 40 MG tablet Take 40 mg by mouth nightly.      ENTRESTO 49-51 mg per tablet Take 1 tablet by mouth 2 (two) times daily.      ezetimibe (ZETIA) 10 mg tablet Take 10 mg by mouth once daily.      furosemide (LASIX) 40 MG tablet Take 40 mg by mouth once daily.      levothyroxine (SYNTHROID) 125 MCG tablet TAKE 1 TABLET BY MOUTH EVERY MORNING ON AN EMPTY STOMACH 1/2 HR TO 1 HR BEFORE BREAKFAST. 30 tablet 6    nitroGLYCERIN (NITROSTAT) 0.4 MG SL tablet Place 0.4 mg under the tongue every 5 (five) minutes as needed for Chest pain.      ranolazine (RANEXA) 500 MG Tb12 Take 500 mg by mouth 2 (two) times a day.      spironolactone (ALDACTONE) 25 MG tablet Take 12.5 mg by mouth Daily.      terbinafine HCL (LAMISIL) 250 mg tablet Take 250 mg by mouth once daily.         Social History     Socioeconomic History    Marital status:    Tobacco Use    Smoking status: Former     Types: Cigars     Passive exposure: Past    Smokeless tobacco: Never   Substance and Sexual Activity    Alcohol use: Not Currently    Drug use: Never    Sexual activity: Not Currently     Social Determinants of Health     Financial Resource Strain: Patient Declined (5/29/2024)    Overall Financial Resource Strain (CARDIA)     Difficulty of Paying Living Expenses: Patient declined   Food Insecurity: No Food Insecurity (5/29/2024)    Hunger Vital Sign     Worried About Running Out of Food in the Last Year: Never true     Ran Out of Food in the Last Year: Never true   Transportation Needs: No Transportation Needs (8/29/2023)    PRAPARE - Transportation     Lack of Transportation (Medical): No     Lack of Transportation (Non-Medical): No   Physical Activity: Sufficiently Active (5/29/2024)     "Exercise Vital Sign     Days of Exercise per Week: 6 days     Minutes of Exercise per Session: 30 min   Stress: No Stress Concern Present (5/29/2024)    [employer] [place] [employer] - Occupational Stress Questionnaire     Feeling of Stress : Only a little   Housing Stability: Low Risk  (8/29/2023)    Housing Stability Vital Sign     Unable to Pay for Housing in the Last Year: No     Number of Places Lived in the Last Year: 1     Unstable Housing in the Last Year: No        Family History   Problem Relation Name Age of Onset    Hypertension Mother      Hypertension Father      Heart attack Father      Heart attack Sister      Heart attack Sister      Heart attack Sister          Patient Care Team:  Avila Johnson DO as PCP - General (Family Medicine)  Sathish Lockwood MD as Consulting Physician (Cardiology)  Romaine Simmons MD as Consulting Physician (Cardiology)  Chuckie Jackson FNP (Dermatology)  Federico Cruz MD (Dermatology)     Subjective:     Review of Systems   Constitutional:  Negative for chills and fever.   Respiratory:  Negative for shortness of breath.    Cardiovascular:  Negative for chest pain.   Gastrointestinal:  Negative for constipation and diarrhea.   Musculoskeletal:  Negative for falls.   Neurological:  Positive for dizziness, sensory change and weakness. Negative for headaches.   Psychiatric/Behavioral:  The patient does not have insomnia.      See HPI for details  All Other ROS: Negative except as stated in HPI.     Objective:     /73   Pulse 99   Temp 97.5 °F (36.4 °C) (Temporal)   Resp 16   Ht 6' 2.02" (1.88 m)   Wt 98 kg (216 lb)   SpO2 98%   BMI 27.72 kg/m²     Physical Exam  Vitals reviewed.   Constitutional:       General: He is not in acute distress.     Appearance: Normal appearance. He is not ill-appearing.   Cardiovascular:      Rate and Rhythm: Normal rate and regular rhythm.      Pulses: Normal pulses.      Heart sounds: Normal heart sounds. No murmur " heard.     No friction rub. No gallop.   Pulmonary:      Effort: No respiratory distress.      Breath sounds: No wheezing, rhonchi or rales.   Musculoskeletal:         General: No swelling.      Right lower leg: No edema.      Left lower leg: No edema.   Skin:     General: Skin is warm and dry.   Neurological:      Mental Status: He is alert.      Motor: Weakness present.      Gait: Gait abnormal.      Comments: 4/5 motor strength in bilateral hip flexors, knee extensors.     3/5 motor strength in plantar flexion bilaterally     2/5 motor strength in dorsiflexion of right foot.     3/5 motor strength in dorsiflexion of left foot.     Psychiatric:         Mood and Affect: Mood normal.         Behavior: Behavior normal.         Assessment/Plan:     1. Numbness and tingling of both legs  -     CBC Auto Differential; Future; Expected date: 06/26/2024  -     Comprehensive Metabolic Panel; Future; Expected date: 06/26/2024  -     X-Ray Lumbar Spine AP And Lateral; Future; Expected date: 06/26/2024  -     Magnesium; Future; Expected date: 06/26/2024  -will try and order home health with PT due to transportation issues.   2. Dizziness  -     CBC Auto Differential; Future; Expected date: 06/26/2024  -     Comprehensive Metabolic Panel; Future; Expected date: 06/26/2024    3. Angina pectoris, unspecified  -asymptomatic. On entresto, amoidarone, aspirin, ranexa, and zetia.   4. Weakness of both legs   -Will refer to home health  Follow up:     Follow up if symptoms worsen or fail to improve. In addition to their scheduled follow up, the patient has also been instructed to follow up on as needed basis.

## 2024-07-03 ENCOUNTER — TELEPHONE (OUTPATIENT)
Dept: FAMILY MEDICINE | Facility: CLINIC | Age: 84
End: 2024-07-03
Payer: MEDICARE

## 2024-07-03 NOTE — TELEPHONE ENCOUNTER
----- Message from Avila Johnson DO sent at 7/3/2024  1:06 PM CDT -----  I have reviewed the imaging results. Degenerative changes throughout lumbar spine as expected.

## 2024-07-30 ENCOUNTER — EXTERNAL HOME HEALTH (OUTPATIENT)
Dept: HOME HEALTH SERVICES | Facility: HOSPITAL | Age: 84
End: 2024-07-30
Payer: MEDICARE

## 2024-08-27 ENCOUNTER — OFFICE VISIT (OUTPATIENT)
Dept: FAMILY MEDICINE | Facility: CLINIC | Age: 84
End: 2024-08-27
Payer: MEDICARE

## 2024-08-27 VITALS
HEIGHT: 74 IN | OXYGEN SATURATION: 96 % | WEIGHT: 217 LBS | BODY MASS INDEX: 27.85 KG/M2 | TEMPERATURE: 98 F | HEART RATE: 84 BPM | SYSTOLIC BLOOD PRESSURE: 130 MMHG | DIASTOLIC BLOOD PRESSURE: 68 MMHG | RESPIRATION RATE: 18 BRPM

## 2024-08-27 DIAGNOSIS — R29.6 RECURRENT FALLS: Primary | ICD-10-CM

## 2024-08-27 NOTE — PROGRESS NOTES
Patient ID: [id number]     Chief Complaint: script for leg braces    HPI:     Adal Phoenix is a 84 y.o. male here today for script for leg braces. Use leg brace for RLE to help with foot drop. The foot drop was leading to multiple falls.     -------------------------------------    CAD (coronary artery disease)    Cancer    Cataract    Congestive heart failure (CHF)    Coronary artery disease    Elevated LFTs    HLD (hyperlipidemia)    HTN (hypertension)    Hypothyroidism    Liver disease    Mitral valve regurgitation    Myocardial infarction    S/P CABG (coronary artery bypass graft)    Squamous cell carcinoma in situ    Thyroid disease    Tremor    Tricuspid valve regurgitation    Ventricular tachycardia        Past Surgical History:   Procedure Laterality Date    ANKLE ARTHROSCOPY W/ OPEN REPAIR Left     BILATERAL INGUINAL HERNIA REPAIR  01/04/2019    Dr Brian Catherine    CARDIAC PACEMAKER PLACEMENT  12/23/2019    CATARACT EXTRACTION W/  INTRAOCULAR LENS IMPLANT      CORONARY ANGIOPLASTY WITH STENT PLACEMENT  04/22/2002    Dr Hans Moran    CORONARY ANGIOPLASTY WITH STENT PLACEMENT  06/16/2003    Dr Hans Moran    CORONARY ARTERY BYPASS GRAFT      EXCISION OF LESION Right 05/27/2022    Dr Gwendolyn Joel MD    EYE SURGERY      SQUAMOUS CELL CARCINOMA EXCISION Left 02/20/2018    ear - Dr Mert Ordaz       Review of patient's allergies indicates:  No Known Allergies    Outpatient Medications Marked as Taking for the 8/27/24 encounter (Office Visit) with Avila Johnson,    Medication Sig Dispense Refill    amiodarone (PACERONE) 200 MG Tab Take 200 mg by mouth 2 (two) times a day.      atorvastatin (LIPITOR) 40 MG tablet Take 40 mg by mouth nightly.      ENTRESTO 49-51 mg per tablet Take 1 tablet by mouth 2 (two) times daily.      ezetimibe (ZETIA) 10 mg tablet Take 10 mg by mouth once daily.      furosemide (LASIX) 40 MG tablet Take 40 mg by mouth once daily.      levothyroxine (SYNTHROID) 125 MCG tablet TAKE  1 TABLET BY MOUTH EVERY MORNING ON AN EMPTY STOMACH 1/2 HR TO 1 HR BEFORE BREAKFAST. 30 tablet 6    nitroGLYCERIN (NITROSTAT) 0.4 MG SL tablet Place 0.4 mg under the tongue every 5 (five) minutes as needed for Chest pain.      ranolazine (RANEXA) 500 MG Tb12 Take 500 mg by mouth 2 (two) times a day.      spironolactone (ALDACTONE) 25 MG tablet Take 12.5 mg by mouth Daily.         Social History     Socioeconomic History    Marital status:    Tobacco Use    Smoking status: Former     Types: Cigars     Passive exposure: Past    Smokeless tobacco: Never   Substance and Sexual Activity    Alcohol use: Not Currently    Drug use: Never    Sexual activity: Not Currently     Social Determinants of Health     Financial Resource Strain: Patient Declined (5/29/2024)    Overall Financial Resource Strain (CARDIA)     Difficulty of Paying Living Expenses: Patient declined   Food Insecurity: No Food Insecurity (5/29/2024)    Hunger Vital Sign     Worried About Running Out of Food in the Last Year: Never true     Ran Out of Food in the Last Year: Never true   Transportation Needs: No Transportation Needs (8/29/2023)    PRAPARE - Transportation     Lack of Transportation (Medical): No     Lack of Transportation (Non-Medical): No   Physical Activity: Sufficiently Active (5/29/2024)    Exercise Vital Sign     Days of Exercise per Week: 6 days     Minutes of Exercise per Session: 30 min   Stress: No Stress Concern Present (5/29/2024)    [employer] [place] [employer] - Occupational Stress Questionnaire     Feeling of Stress : Only a little   Housing Stability: Low Risk  (8/29/2023)    Housing Stability Vital Sign     Unable to Pay for Housing in the Last Year: No     Number of Places Lived in the Last Year: 1     Unstable Housing in the Last Year: No        Family History   Problem Relation Name Age of Onset    Hypertension Mother      Hypertension Father      Heart attack Father      Heart attack Sister      Heart  "attack Sister      Heart attack Sister          Patient Care Team:  Avila Johnson DO as PCP - General (Family Medicine)  Sathish Lockwood MD as Consulting Physician (Cardiology)  Romaine Simmons MD as Consulting Physician (Cardiology)  Chuckie Jackson FNP (Dermatology)  Federico Cruz MD (Dermatology)     Subjective:     Review of Systems   Constitutional:  Negative for chills and fever.   Respiratory:  Negative for shortness of breath.    Cardiovascular:  Negative for chest pain.   Gastrointestinal:  Negative for constipation and diarrhea.   Neurological:  Negative for headaches.   Psychiatric/Behavioral:  The patient does not have insomnia.        See HPI for details  All Other ROS: Negative except as stated in HPI.       Objective:     /68 (BP Location: Left arm, Patient Position: Sitting, BP Method: Large (Manual))   Pulse 84   Temp 97.5 °F (36.4 °C)   Resp 18   Ht 6' 2" (1.88 m)   Wt 98.4 kg (217 lb)   SpO2 96%   BMI 27.86 kg/m²     Physical Exam  Vitals reviewed.   Constitutional:       General: He is not in acute distress.     Appearance: Normal appearance. He is not ill-appearing.   Cardiovascular:      Rate and Rhythm: Normal rate and regular rhythm.      Pulses: Normal pulses.      Heart sounds: Normal heart sounds. No murmur heard.     No friction rub. No gallop.   Pulmonary:      Effort: No respiratory distress.      Breath sounds: No wheezing, rhonchi or rales.   Musculoskeletal:         General: No swelling.      Right lower leg: No edema.      Left lower leg: No edema.   Skin:     General: Skin is warm and dry.   Neurological:      General: No focal deficit present.      Mental Status: He is alert.   Psychiatric:         Mood and Affect: Mood normal.         Behavior: Behavior normal.         Assessment/Plan:     1. Recurrent falls      Use leg brace for RLE to help with foot drop. The foot drop was leading to multiple falls.     Follow up:     Follow up if symptoms worsen or fail " to improve. In addition to their scheduled follow up, the patient has also been instructed to follow up on as needed basis.

## 2024-09-12 DIAGNOSIS — R29.6 RECURRENT FALLS: Primary | ICD-10-CM

## 2024-09-12 DIAGNOSIS — M21.371 RIGHT FOOT DROP: ICD-10-CM

## 2024-09-16 DIAGNOSIS — R29.6 RECURRENT FALLS: Primary | ICD-10-CM

## 2024-09-16 DIAGNOSIS — M21.372 LEFT FOOT DROP: ICD-10-CM

## 2024-09-17 ENCOUNTER — EXTERNAL HOME HEALTH (OUTPATIENT)
Dept: HOME HEALTH SERVICES | Facility: HOSPITAL | Age: 84
End: 2024-09-17
Payer: MEDICARE

## 2024-10-29 NOTE — PLAN OF CARE
Problem: Physical Therapy  Goal: Physical Therapy Goal  Description: Goals to be met by: Discharge     Patient will increase functional independence with mobility by performin. Supine to sit with Modified [place]  2. Sit to supine with Modified [place]  3. Sit to stand transfer with Modified [place]  4. Bed to chair transfer with Modified [place] using Rolling Walker vs LRAD  5. Gait  x 200 feet with Modified [place] using Rolling Walker vs LRAD.   6. Ascend/descend 5 stair with left Handrails Stand-by Assistance using No Assistive Device.     Outcome: Adequate for Care Transition      "  Problem: PHYSICAL THERAPY ADULT  Goal: Performs mobility at highest level of function for planned discharge setting.  See evaluation for individualized goals.  Description: Treatment/Interventions: Functional transfer training, LE strengthening/ROM, Therapeutic exercise, Endurance training, Cognitive reorientation, Bed mobility, Gait training, Spoke to nursing, OT  Equipment Recommended: Walker       See flowsheet documentation for full assessment, interventions and recommendations.  Outcome: Progressing  Note: Prognosis: Good  Problem List: Decreased strength, Decreased endurance, Impaired balance, Decreased mobility, Obesity, Decreased skin integrity, Pain  Assessment: Pt required increased assist for sit<>stand today to [place] and increased  amount of time for completion of ambualtion today 2* reports of back \"spasms\" - pt did report walking helps \"loosen her back up\"  and PT encouraged pt to use C/L and ask staff to assist w/ walking when she wants to since that helps ease her pain. PT also contacted restorative staff to check in w/ pt daily. Pt continues to function below baseline and skilled inpt rehab Level 2 on d/c recommended prior to d/c back to [facility].        Rehab Resource Intensity Level, PT: II (Moderate Resource Intensity)    See flowsheet documentation for full assessment.        "

## 2024-11-04 DIAGNOSIS — I10 ESSENTIAL HYPERTENSION, BENIGN: Primary | ICD-10-CM

## 2024-11-04 DIAGNOSIS — R06.02 SHORTNESS OF BREATH: ICD-10-CM

## 2024-11-18 ENCOUNTER — PROCEDURE VISIT (OUTPATIENT)
Dept: RESPIRATORY THERAPY | Facility: HOSPITAL | Age: 84
End: 2024-11-18
Attending: NURSE PRACTITIONER
Payer: MEDICARE

## 2024-11-18 ENCOUNTER — HOSPITAL ENCOUNTER (OUTPATIENT)
Dept: RADIOLOGY | Facility: HOSPITAL | Age: 84
Discharge: HOME | End: 2024-11-18
Attending: NURSE PRACTITIONER
Payer: MEDICARE

## 2024-11-18 DIAGNOSIS — I10 ESSENTIAL HYPERTENSION, BENIGN: ICD-10-CM

## 2024-11-18 DIAGNOSIS — I25.10 CORONARY ATHEROSCLEROSIS OF NATIVE CORONARY ARTERY: ICD-10-CM

## 2024-11-18 DIAGNOSIS — I10 HYPERTENSION, ESSENTIAL: ICD-10-CM

## 2024-11-18 DIAGNOSIS — I47.10 SUPRAVENTRICULAR TACHYCARDIA: ICD-10-CM

## 2024-11-18 DIAGNOSIS — R06.02 SHORTNESS OF BREATH: ICD-10-CM

## 2024-11-18 PROCEDURE — 94727 GAS DIL/WSHOT DETER LNG VOL: CPT

## 2024-11-18 PROCEDURE — 71046 X-RAY EXAM CHEST 2 VIEWS: CPT | Mod: TC

## 2024-11-18 PROCEDURE — 94729 DIFFUSING CAPACITY: CPT

## 2024-11-18 PROCEDURE — 94010 BREATHING CAPACITY TEST: CPT

## 2024-11-18 NOTE — PROGRESS NOTES
PT COULD NOT EXHALE FOR 6 SECONDS, MULTIPLE FVC'S ATTEMPTED, HAD PT EXHALE 4 SECONDS INSTEAD    GREAT PT EFFORT AND COOPERATION     PFT COMPLETE

## 2024-12-11 ENCOUNTER — OFFICE VISIT (OUTPATIENT)
Dept: FAMILY MEDICINE | Facility: CLINIC | Age: 84
End: 2024-12-11
Payer: MEDICARE

## 2024-12-11 VITALS
OXYGEN SATURATION: 98 % | RESPIRATION RATE: 18 BRPM | TEMPERATURE: 97 F | HEART RATE: 93 BPM | BODY MASS INDEX: 28.23 KG/M2 | HEIGHT: 74 IN | SYSTOLIC BLOOD PRESSURE: 132 MMHG | DIASTOLIC BLOOD PRESSURE: 78 MMHG | WEIGHT: 220 LBS

## 2024-12-11 DIAGNOSIS — E03.9 HYPOTHYROIDISM, UNSPECIFIED TYPE: ICD-10-CM

## 2024-12-11 DIAGNOSIS — I50.22 CHRONIC HFREF (HEART FAILURE WITH REDUCED EJECTION FRACTION): ICD-10-CM

## 2024-12-11 DIAGNOSIS — Z00.00 ENCOUNTER FOR PREVENTIVE HEALTH EXAMINATION: Primary | ICD-10-CM

## 2024-12-11 DIAGNOSIS — I95.1 ORTHOSTATIC HYPOTENSION: ICD-10-CM

## 2024-12-11 PROBLEM — U07.1 COVID-19 VIRUS INFECTION: Status: RESOLVED | Noted: 2022-07-27 | Resolved: 2024-12-11

## 2024-12-11 PROBLEM — E78.2 MIXED HYPERLIPIDEMIA: Status: ACTIVE | Noted: 2020-02-06

## 2024-12-11 PROCEDURE — G0439 PPPS, SUBSEQ VISIT: HCPCS | Mod: ,,, | Performed by: FAMILY MEDICINE

## 2024-12-11 NOTE — PROGRESS NOTES
"   Internal Medicine    Adal Phoenix is a 84 y.o. male here today for a Medicare Annual Wellness visit and comprehensive Health Risk Assessment. Fell 2 days ago leaving a store when he stepped off a curb and lost his balance.     Subjective   The following components were reviewed and updated:  Medical history  Family History  Social history  Allergies  Current Medications  Immunizations  Health Maintenance  Patient Care Team    Review of Systems   Neurological:  Positive for dizziness.     A comprehensive review of systems was conducted and is negative except as noted above.     Objective   Visit Vitals  /78 (BP Location: Left arm, Patient Position: Sitting)   Pulse 93   Temp 97 °F (36.1 °C)   Resp 18   Ht 6' 2" (1.88 m)   Wt 99.8 kg (220 lb)   SpO2 98%   BMI 28.25 kg/m²        Physical Exam  Vitals reviewed.   Constitutional:       General: He is not in acute distress.     Appearance: Normal appearance. He is not ill-appearing.   Cardiovascular:      Rate and Rhythm: Normal rate and regular rhythm.      Pulses: Normal pulses.      Heart sounds: Normal heart sounds. No murmur heard.     No friction rub. No gallop.   Pulmonary:      Effort: No respiratory distress.      Breath sounds: No wheezing, rhonchi or rales.   Musculoskeletal:         General: No swelling.      Right lower leg: No edema.      Left lower leg: No edema.   Skin:     General: Skin is warm and dry.   Neurological:      General: No focal deficit present.      Mental Status: He is alert.   Psychiatric:         Mood and Affect: Mood normal.         Behavior: Behavior normal.          Assessment/Plan:  1. Encounter for preventive health examination  -     CBC Auto Differential; Future; Expected date: 12/11/2024  -     Comprehensive Metabolic Panel; Future; Expected date: 12/11/2024  -     Lipid Panel; Future; Expected date: 12/11/2024  -     TSH; Future; Expected date: 12/11/2024    2. Orthostatic hypotension  -     CBC Auto Differential; Future; " "Expected date: 12/11/2024    3. Chronic HFrEF (heart failure with reduced ejection fraction)  -     CBC Auto Differential; Future; Expected date: 12/11/2024  -     Comprehensive Metabolic Panel; Future; Expected date: 12/11/2024  -     Lipid Panel; Future; Expected date: 12/11/2024    4. Hypothyroidism, unspecified type  -     TSH; Future; Expected date: 12/11/2024    Likely secondary to medication side effects. Advised patient to discuss his symptoms with his Cardiologist.       A comprehensive HEALTH RISK ASSESSMENT was completed today. Results are summarized below:    There are NO EMOTIONAL/SOCIAL CONCERNS identified on today's screening for Social Isolation, Depression and Anxiety.    There are NO COGNITIVE FUNCTION CONCERNS identified on today's screening.    The following FUNCTIONAL AND/OR SAFETY CONCERNS were identified on today's screening for Physical Symptoms, Nutritional, Home Safety/Living Situation, Fall Risk, Activities of Daily Living, Independent Activities of Daily Living, Physical Activity,Timed Up and Go test and Whisper test::   *Patient reports recently FEELING DIZZY, has a FEAR OF FALLING or HAS FALLEN. (In the past four weeks have you felt dizzy when standing up, were afraid of falling or fallen?: (!) Yes ("Dizzy all the time"))     The patient reports NO OPIOID PRESCRIPTIONS. This was confirmed through medication reconciliation and the [facility] website.    The patient is NOT A TOBACCO USER.  The patient reports NO SIGNIFICANT ALCOHOL USE.     All Questions regarding food, transportation or housing were not answered today.      I provided Adal Phoenix with a 5-10 year written Screening Schedule per USPSTF age appropriate recommendations and a Personal Prevention Plan based on the results of today's Health Risk Assessment. Education, counseling, and referrals were provided as documented above and can be viewed in the After Visit Summary.    Follow up in about 6 months (around 6/11/2025) for Follow up " chronic conditions. In addition to this scheduled follow up, the patient has also been instructed to follow up on as needed basis.     none  The patient was asked and declined the use of a free .  Advance Care Planning   Today we discussed advance care planning. He is interested in learning more about how to make Advance Directives. Information was provided and I offered to discuss more at his discretion.

## 2024-12-11 NOTE — PATIENT INSTRUCTIONS
Medicare Annual Wellness Visit      Patient Name: Adal Phoenix  Today's Date: 12/11/2024    Below you will find your 5-10 year Screening Plan Recommendations:  Health Maintenance       Date Due Completion Date    Pneumococcal Vaccines (Age 65+) (1 of 2 - PCV) Never done ---    Shingles Vaccine (1 of 2) Never done ---    RSV Vaccine (Age 60+ and Pregnant patients) (1 - 1-dose 75+ series) Never done ---    Influenza Vaccine (1) 09/01/2024 10/2/2019    COVID-19 Vaccine (4 - 2024-25 season) 09/01/2024 12/10/2021    Aspirin/Antiplatelet Therapy 06/26/2025 6/26/2024    Lipid Panel 05/08/2029 5/8/2024    TETANUS VACCINE 02/01/2034 2/1/2024          Below is your summarized Personalized Prevention Plan that addresses any concerns we discussed today at your visit. Please see attached detailed information specific to your Health Concerns.  No orders of the defined types were placed in this encounter.        The following information is provided to all patients.  This information is to help you find additional resources for any problems that may be affecting your health: Living healthy guide: www.[facility].[place].gov      Understanding Diabetes: www.diabetes.org      Eating healthy: www.cdc.gov/healthyweight      CDC home safety checklist: www.cdc.gov/steadi/patient.html      Agency on Aging: www.goea.[place].[facility]      Alcoholics anonymous (AA): www.aa.org      Physical Activity: www.kiran.nih.gov/xg0jxwc      Tobacco use: www.quitwithusla.org

## 2024-12-17 ENCOUNTER — LAB VISIT (OUTPATIENT)
Dept: LAB | Facility: HOSPITAL | Age: 84
End: 2024-12-17
Attending: NURSE PRACTITIONER
Payer: MEDICARE

## 2024-12-17 DIAGNOSIS — Z00.00 ENCOUNTER FOR PREVENTIVE HEALTH EXAMINATION: ICD-10-CM

## 2024-12-17 DIAGNOSIS — I95.1 ORTHOSTATIC HYPOTENSION: ICD-10-CM

## 2024-12-17 DIAGNOSIS — I50.22 CHRONIC HFREF (HEART FAILURE WITH REDUCED EJECTION FRACTION): ICD-10-CM

## 2024-12-17 DIAGNOSIS — E03.9 HYPOTHYROIDISM, UNSPECIFIED TYPE: Primary | ICD-10-CM

## 2024-12-17 LAB
ALBUMIN SERPL-MCNC: 3.8 G/DL (ref 3.4–4.8)
ALBUMIN/GLOB SERPL: 1.2 RATIO (ref 1.1–2)
ALP SERPL-CCNC: 94 UNIT/L (ref 40–150)
ALT SERPL-CCNC: 35 UNIT/L (ref 0–55)
ANION GAP SERPL CALC-SCNC: 8 MEQ/L
AST SERPL-CCNC: 32 UNIT/L (ref 5–34)
BASOPHILS # BLD AUTO: 0.02 X10(3)/MCL
BASOPHILS NFR BLD AUTO: 0.3 %
BILIRUB SERPL-MCNC: 0.6 MG/DL
BUN SERPL-MCNC: 13 MG/DL (ref 8.4–25.7)
CALCIUM SERPL-MCNC: 9.6 MG/DL (ref 8.8–10)
CHLORIDE SERPL-SCNC: 103 MMOL/L (ref 98–107)
CHOLEST SERPL-MCNC: 139 MG/DL
CHOLEST/HDLC SERPL: 2 {RATIO} (ref 0–5)
CO2 SERPL-SCNC: 29 MMOL/L (ref 23–31)
CREAT SERPL-MCNC: 1.29 MG/DL (ref 0.72–1.25)
CREAT/UREA NIT SERPL: 10
EOSINOPHIL # BLD AUTO: 0.12 X10(3)/MCL (ref 0–0.9)
EOSINOPHIL NFR BLD AUTO: 2 %
ERYTHROCYTE [DISTWIDTH] IN BLOOD BY AUTOMATED COUNT: 12.5 % (ref 11.5–17)
GFR SERPLBLD CREATININE-BSD FMLA CKD-EPI: 55 ML/MIN/1.73/M2
GLOBULIN SER-MCNC: 3.1 GM/DL (ref 2.4–3.5)
GLUCOSE SERPL-MCNC: 106 MG/DL (ref 82–115)
HCT VFR BLD AUTO: 40.8 % (ref 42–52)
HDLC SERPL-MCNC: 56 MG/DL (ref 35–60)
HGB BLD-MCNC: 13.3 G/DL (ref 14–18)
IMM GRANULOCYTES # BLD AUTO: 0.02 X10(3)/MCL (ref 0–0.04)
IMM GRANULOCYTES NFR BLD AUTO: 0.3 %
LDLC SERPL CALC-MCNC: 64 MG/DL (ref 50–140)
LYMPHOCYTES # BLD AUTO: 1.64 X10(3)/MCL (ref 0.6–4.6)
LYMPHOCYTES NFR BLD AUTO: 27.4 %
MCH RBC QN AUTO: 30.9 PG (ref 27–31)
MCHC RBC AUTO-ENTMCNC: 32.6 G/DL (ref 33–36)
MCV RBC AUTO: 94.9 FL (ref 80–94)
MONOCYTES # BLD AUTO: 0.62 X10(3)/MCL (ref 0.1–1.3)
MONOCYTES NFR BLD AUTO: 10.4 %
NEUTROPHILS # BLD AUTO: 3.56 X10(3)/MCL (ref 2.1–9.2)
NEUTROPHILS NFR BLD AUTO: 59.6 %
NRBC BLD AUTO-RTO: 0 %
PLATELET # BLD AUTO: 257 X10(3)/MCL (ref 130–400)
PMV BLD AUTO: 9.1 FL (ref 7.4–10.4)
POTASSIUM SERPL-SCNC: 5.3 MMOL/L (ref 3.5–5.1)
PROT SERPL-MCNC: 6.9 GM/DL (ref 5.8–7.6)
RBC # BLD AUTO: 4.3 X10(6)/MCL (ref 4.7–6.1)
SODIUM SERPL-SCNC: 140 MMOL/L (ref 136–145)
T4 FREE SERPL-MCNC: 1.04 NG/DL (ref 0.7–1.48)
TRIGL SERPL-MCNC: 93 MG/DL (ref 34–140)
TSH SERPL-ACNC: 11.86 UIU/ML (ref 0.35–4.94)
VLDLC SERPL CALC-MCNC: 19 MG/DL
WBC # BLD AUTO: 5.98 X10(3)/MCL (ref 4.5–11.5)

## 2024-12-17 PROCEDURE — 84443 ASSAY THYROID STIM HORMONE: CPT

## 2024-12-17 PROCEDURE — 85025 COMPLETE CBC W/AUTO DIFF WBC: CPT

## 2024-12-17 PROCEDURE — 36415 COLL VENOUS BLD VENIPUNCTURE: CPT

## 2024-12-17 PROCEDURE — 80061 LIPID PANEL: CPT

## 2024-12-17 PROCEDURE — 84439 ASSAY OF FREE THYROXINE: CPT

## 2024-12-17 PROCEDURE — 80053 COMPREHEN METABOLIC PANEL: CPT

## 2024-12-18 ENCOUNTER — TELEPHONE (OUTPATIENT)
Dept: FAMILY MEDICINE | Facility: CLINIC | Age: 84
End: 2024-12-18
Payer: MEDICARE

## 2024-12-18 DIAGNOSIS — E03.9 HYPOTHYROIDISM, UNSPECIFIED TYPE: ICD-10-CM

## 2024-12-18 NOTE — TELEPHONE ENCOUNTER
----- Message from Yanique Ramirez NP sent at 12/18/2024  3:19 PM CST -----  Please inform patient of lab results.     1. TSH is over 11. Is the patient taking levothyroxine? This may be causing orthostatic hypotension as well.     2. Hyperkalemia- Decrease consumption of potassium rich foods. May be from Entresto as well.     3. Kidney function has improved.     4. Anemia but stable.     5. Cholesterol normal.     Please forward labs to cardiology for review.     Thanks for all you do,   Yanique

## 2024-12-18 NOTE — TELEPHONE ENCOUNTER
Spoke with Dora and informed her of results. She is unsure if he is taking his thyroid medication or not. She is going to look into it and call me back later this week.

## 2024-12-19 RX ORDER — LEVOTHYROXINE SODIUM 137 UG/1
137 TABLET ORAL
Qty: 30 TABLET | Refills: 2 | Status: SHIPPED | OUTPATIENT
Start: 2024-12-19 | End: 2025-03-19

## 2024-12-19 NOTE — TELEPHONE ENCOUNTER
Patient's daughter called back today and stated he is taking his thyroid medicaiton as prescribed and will need a refill. He stated if we gonna change it please do it now before he fills it.

## 2025-03-25 DIAGNOSIS — E03.9 HYPOTHYROIDISM, UNSPECIFIED TYPE: ICD-10-CM

## 2025-03-25 RX ORDER — LEVOTHYROXINE SODIUM 137 UG/1
TABLET ORAL
Qty: 90 TABLET | Refills: 2 | Status: SHIPPED | OUTPATIENT
Start: 2025-03-25

## 2025-06-02 ENCOUNTER — LAB VISIT (OUTPATIENT)
Dept: LAB | Facility: HOSPITAL | Age: 85
End: 2025-06-02
Attending: INTERNAL MEDICINE
Payer: MEDICARE

## 2025-06-02 DIAGNOSIS — I10 ESSENTIAL HYPERTENSION, MALIGNANT: Primary | ICD-10-CM

## 2025-06-02 LAB
ANION GAP SERPL CALC-SCNC: 8 MEQ/L
BUN SERPL-MCNC: 19 MG/DL (ref 8.4–25.7)
CALCIUM SERPL-MCNC: 9 MG/DL (ref 8.8–10)
CHLORIDE SERPL-SCNC: 106 MMOL/L (ref 98–107)
CO2 SERPL-SCNC: 28 MMOL/L (ref 23–31)
CREAT SERPL-MCNC: 1.3 MG/DL (ref 0.72–1.25)
CREAT/UREA NIT SERPL: 15
GFR SERPLBLD CREATININE-BSD FMLA CKD-EPI: 55 ML/MIN/1.73/M2
GLUCOSE SERPL-MCNC: 102 MG/DL (ref 82–115)
POTASSIUM SERPL-SCNC: 5.6 MMOL/L (ref 3.5–5.1)
SODIUM SERPL-SCNC: 142 MMOL/L (ref 136–145)

## 2025-06-02 PROCEDURE — 80048 BASIC METABOLIC PNL TOTAL CA: CPT

## 2025-06-02 PROCEDURE — 36415 COLL VENOUS BLD VENIPUNCTURE: CPT

## 2025-06-12 ENCOUNTER — OFFICE VISIT (OUTPATIENT)
Dept: FAMILY MEDICINE | Facility: CLINIC | Age: 85
End: 2025-06-12
Payer: MEDICARE

## 2025-06-12 ENCOUNTER — LAB VISIT (OUTPATIENT)
Dept: LAB | Facility: HOSPITAL | Age: 85
End: 2025-06-12
Attending: FAMILY MEDICINE
Payer: MEDICARE

## 2025-06-12 ENCOUNTER — RESULTS FOLLOW-UP (OUTPATIENT)
Dept: FAMILY MEDICINE | Facility: CLINIC | Age: 85
End: 2025-06-12

## 2025-06-12 VITALS
HEART RATE: 88 BPM | WEIGHT: 215.81 LBS | OXYGEN SATURATION: 98 % | DIASTOLIC BLOOD PRESSURE: 79 MMHG | RESPIRATION RATE: 18 BRPM | BODY MASS INDEX: 27.7 KG/M2 | SYSTOLIC BLOOD PRESSURE: 139 MMHG | TEMPERATURE: 97 F | HEIGHT: 74 IN

## 2025-06-12 DIAGNOSIS — I50.9 CHRONIC CONGESTIVE HEART FAILURE, UNSPECIFIED HEART FAILURE TYPE: ICD-10-CM

## 2025-06-12 DIAGNOSIS — E03.9 HYPOTHYROIDISM, UNSPECIFIED TYPE: Primary | ICD-10-CM

## 2025-06-12 DIAGNOSIS — E87.5 HYPERKALEMIA: ICD-10-CM

## 2025-06-12 DIAGNOSIS — E03.9 HYPOTHYROIDISM, UNSPECIFIED TYPE: ICD-10-CM

## 2025-06-12 PROBLEM — H01.005 BLEPHARITIS OF LEFT LOWER EYELID: Status: RESOLVED | Noted: 2022-07-31 | Resolved: 2025-06-12

## 2025-06-12 LAB
ANION GAP SERPL CALC-SCNC: 9 MEQ/L
BUN SERPL-MCNC: 19 MG/DL (ref 8.4–25.7)
CALCIUM SERPL-MCNC: 8.8 MG/DL (ref 8.8–10)
CHLORIDE SERPL-SCNC: 103 MMOL/L (ref 98–107)
CO2 SERPL-SCNC: 28 MMOL/L (ref 23–31)
CREAT SERPL-MCNC: 1.26 MG/DL (ref 0.72–1.25)
CREAT/UREA NIT SERPL: 15
GFR SERPLBLD CREATININE-BSD FMLA CKD-EPI: 57 ML/MIN/1.73/M2
GLUCOSE SERPL-MCNC: 85 MG/DL (ref 82–115)
POTASSIUM SERPL-SCNC: 4.4 MMOL/L (ref 3.5–5.1)
SODIUM SERPL-SCNC: 140 MMOL/L (ref 136–145)
TSH SERPL-ACNC: 1.93 UIU/ML (ref 0.35–4.94)

## 2025-06-12 PROCEDURE — 99214 OFFICE O/P EST MOD 30 MIN: CPT | Mod: ,,, | Performed by: FAMILY MEDICINE

## 2025-06-12 PROCEDURE — G2211 COMPLEX E/M VISIT ADD ON: HCPCS | Mod: ,,, | Performed by: FAMILY MEDICINE

## 2025-06-12 PROCEDURE — 84443 ASSAY THYROID STIM HORMONE: CPT

## 2025-06-12 PROCEDURE — 80048 BASIC METABOLIC PNL TOTAL CA: CPT

## 2025-06-12 PROCEDURE — 36415 COLL VENOUS BLD VENIPUNCTURE: CPT

## 2025-06-12 RX ORDER — EMPAGLIFLOZIN 10 MG/1
10 TABLET, FILM COATED ORAL DAILY
COMMUNITY
Start: 2025-05-29

## 2025-06-12 NOTE — PROGRESS NOTES
Patient ID: [id number]     Chief Complaint: Follow-up (6mnt FU)    HPI:     Adal Phoenix is a 84 y.o. male here today for Follow-up (6mnt FU). Reviewed labs with patient from 6/2/25. Elevated potassium (5.6). currently taking spironolactone 12.5mg daily.     History of Present Illness               -------------------------------------    CAD (coronary artery disease)    Cancer    Cataract    Congestive heart failure (CHF)    Coronary artery disease    Elevated LFTs    HLD (hyperlipidemia)    HTN (hypertension)    Hypothyroidism    Liver disease    Mitral valve regurgitation    Myocardial infarction    S/P CABG (coronary artery bypass graft)    Squamous cell carcinoma in situ    Thyroid disease    Tremor    Tricuspid valve regurgitation    Ventricular tachycardia        Past Surgical History:   Procedure Laterality Date    ANKLE ARTHROSCOPY W/ OPEN REPAIR Left     BILATERAL INGUINAL HERNIA REPAIR  01/04/2019    Dr Brian Catherine    CARDIAC PACEMAKER PLACEMENT  12/23/2019    CATARACT EXTRACTION W/  INTRAOCULAR LENS IMPLANT      CORONARY ANGIOPLASTY WITH STENT PLACEMENT  04/22/2002    Dr Hans Moran    CORONARY ANGIOPLASTY WITH STENT PLACEMENT  06/16/2003    Dr Hans Moran    CORONARY ARTERY BYPASS GRAFT      EXCISION OF LESION Right 05/27/2022    Dr Gwendolyn Joel MD    EYE SURGERY      SQUAMOUS CELL CARCINOMA EXCISION Left 02/20/2018    ear - Dr Mert Ordaz       Review of patient's allergies indicates:  No Known Allergies    Outpatient Medications Marked as Taking for the 6/12/25 encounter (Office Visit) with Avila Johnson,    Medication Sig Dispense Refill    amiodarone (PACERONE) 200 MG Tab Take 200 mg by mouth 2 (two) times a day.      aspirin 81 MG Chew Take 81 mg by mouth Daily.      atorvastatin (LIPITOR) 40 MG tablet Take 40 mg by mouth nightly.      ENTRESTO 49-51 mg per tablet Take 1 tablet by mouth 2 (two) times daily.      ezetimibe (ZETIA) 10 mg tablet Take 10 mg by mouth once daily.       "furosemide (LASIX) 40 MG tablet Take 20 mg by mouth once daily.      JARDIANCE 10 mg tablet Take 10 mg by mouth once daily.      levothyroxine (SYNTHROID) 137 MCG Tab tablet TAKE 1 TABLET BY MOUTH EVERY MORNING ON AN EMPTY STOMACH 1/2 HR TO 1 HR BEFORE BREAKFAST. 90 tablet 2    nitroGLYCERIN (NITROSTAT) 0.4 MG SL tablet Place 0.4 mg under the tongue every 5 (five) minutes as needed for Chest pain.      ranolazine (RANEXA) 500 MG Tb12 Take 500 mg by mouth 2 (two) times a day.      spironolactone (ALDACTONE) 25 MG tablet Take 12.5 mg by mouth Daily.         Social History[1]     Family History   Problem Relation Name Age of Onset    Hypertension Mother Germaine     Hypertension Father Adal     Heart attack Father Adal     Heart attack Sister      Heart attack Sister      Heart attack Sister          Patient Care Team:  Avila Johnson DO as PCP - General (Family Medicine)  Sathish Lockwood MD as Consulting Physician (Cardiology)  Romaine Simmons MD as Consulting Physician (Cardiology)  Chuckie Jackson FNP (Dermatology)  Federico Cruz MD (Dermatology)     Subjective:     ROS    See HPI for details  All Other ROS: Negative except as stated in HPI.       Objective:     /79 (BP Location: Left arm, Patient Position: Sitting)   Pulse 88   Temp 97 °F (36.1 °C)   Resp 18   Ht 6' 2" (1.88 m)   Wt 97.9 kg (215 lb 12.8 oz)   SpO2 98%   BMI 27.71 kg/m²     Physical Exam  Vitals reviewed.   Constitutional:       General: He is not in acute distress.     Appearance: Normal appearance. He is not ill-appearing.   Cardiovascular:      Rate and Rhythm: Normal rate and regular rhythm.      Pulses: Normal pulses.      Heart sounds: Normal heart sounds. No murmur heard.     No friction rub. No gallop.   Pulmonary:      Effort: No respiratory distress.      Breath sounds: No wheezing, rhonchi or rales.   Musculoskeletal:         General: No swelling.      Right lower leg: No edema.      Left lower leg: No edema.   Skin:     " General: Skin is warm and dry.   Neurological:      General: No focal deficit present.      Mental Status: He is alert.   Psychiatric:         Mood and Affect: Mood normal.         Behavior: Behavior normal.         Assessment/Plan:     1. Hypothyroidism, unspecified type  -     TSH; Future; Expected date: 06/12/2025  -currently on levothyroxine 137mcg daily.   2. Hyperkalemia  -     Basic Metabolic Panel; Future; Expected date: 06/12/2025    3. Chronic congestive heart failure, unspecified heart failure type      -may need to stop spironolactone or add K-exalyate to remove excess potassium if spironolactone is necessary for control of CHF symptoms.     Assessment & Plan               This note was generated with the assistance of ambient listening technology. Verbal consent was obtained by the patient and accompanying visitor(s) for the recording of patient appointment to facilitate this note. I attest to having reviewed and edited the generated note for accuracy, though some syntax or spelling errors may persist. Please contact the author of this note for any clarification.     Follow up:     Follow up in about 6 months (around 12/12/2025) for Medicare Wellness. In addition to their scheduled follow up, the patient has also been instructed to follow up on as needed basis.          [1]   Social History  Socioeconomic History    Marital status:    Tobacco Use    Smoking status: Former     Types: Cigars     Passive exposure: Past    Smokeless tobacco: Never   Substance and Sexual Activity    Alcohol use: Not Currently    Drug use: Never    Sexual activity: Not Currently     Social Drivers of Health     Financial Resource Strain: Low Risk  (6/5/2025)    Overall Financial Resource Strain (CARDIA)     Difficulty of Paying Living Expenses: Not very hard   Food Insecurity: No Food Insecurity (6/5/2025)    Hunger Vital Sign     Worried About Running Out of Food in the Last Year: Never true     Ran Out of Food in the  Last Year: Never true   Transportation Needs: No Transportation Needs (6/5/2025)    PRAPARE - Transportation     Lack of Transportation (Medical): No     Lack of Transportation (Non-Medical): No   Physical Activity: Sufficiently Active (6/5/2025)    Exercise Vital Sign     Days of Exercise per Week: 6 days     Minutes of Exercise per Session: 30 min   Stress: No Stress Concern Present (6/5/2025)    [employer] - Occupational Stress Questionnaire     Feeling of Stress : Only a little   Housing Stability: Low Risk  (6/5/2025)    Housing Stability Vital Sign     Unable to Pay for Housing in the Last Year: No     Number of Times Moved in the Last Year: 0     Homeless in the Last Year: No

## 2025-06-13 ENCOUNTER — TELEPHONE (OUTPATIENT)
Dept: FAMILY MEDICINE | Facility: CLINIC | Age: 85
End: 2025-06-13
Payer: MEDICARE

## 2025-06-16 ENCOUNTER — TELEPHONE (OUTPATIENT)
Dept: FAMILY MEDICINE | Facility: CLINIC | Age: 85
End: 2025-06-16
Payer: MEDICARE

## 2025-06-16 NOTE — TELEPHONE ENCOUNTER
Copied from CRM #[id number]. Topic: General Inquiry - Patient Advice  >> Jun 16, 2025  8:47 AM Rani wrote:  .Who Called: Dora    Caller is requesting assistance/information from provider's office.    Symptoms (please be specific): na   How long has patient had these symptoms:  na  List of preferred pharmacies on file (remove unneeded): [unfilled]  If different, enter pharmacy into here including location and phone number: na      Preferred Method of Contact: Phone Call  Patient's Preferred Phone Number on File: [phone]   Best Call Back Number, if different:  Additional Information: pt's daughter called regarding patient stated that doctor wants to reduce some of his medications. She has questions

## 2025-06-16 NOTE — TELEPHONE ENCOUNTER
"Dora will sent message to Dr. Johnson through portal regarding questions about lower Mr. Crouch medication. She believes "my dad may have gotten confused about DrYessi Wanted to lower a med."  "

## 2025-08-11 ENCOUNTER — LAB VISIT (OUTPATIENT)
Dept: LAB | Facility: HOSPITAL | Age: 85
End: 2025-08-11
Attending: INTERNAL MEDICINE
Payer: MEDICARE

## 2025-08-11 DIAGNOSIS — I10 ESSENTIAL HYPERTENSION, MALIGNANT: Primary | ICD-10-CM

## 2025-08-11 LAB
ANION GAP SERPL CALC-SCNC: 10 MEQ/L
BUN SERPL-MCNC: 17 MG/DL (ref 8.4–25.7)
CALCIUM SERPL-MCNC: 9 MG/DL (ref 8.8–10)
CHLORIDE SERPL-SCNC: 104 MMOL/L (ref 98–107)
CO2 SERPL-SCNC: 23 MMOL/L (ref 23–31)
CREAT SERPL-MCNC: 1.24 MG/DL (ref 0.72–1.25)
CREAT/UREA NIT SERPL: 14
GFR SERPLBLD CREATININE-BSD FMLA CKD-EPI: 58 ML/MIN/1.73/M2
GLUCOSE SERPL-MCNC: 135 MG/DL (ref 82–115)
POTASSIUM SERPL-SCNC: 4.7 MMOL/L (ref 3.5–5.1)
SODIUM SERPL-SCNC: 137 MMOL/L (ref 136–145)

## 2025-08-11 PROCEDURE — 80048 BASIC METABOLIC PNL TOTAL CA: CPT

## 2025-08-11 PROCEDURE — 36415 COLL VENOUS BLD VENIPUNCTURE: CPT

## (undated) DEVICE — SEE MEDLINE ITEM 157117

## (undated) DEVICE — MEDIPORE+PAD

## (undated) DEVICE — ELECTRODE BLADE INSULATED 1 IN

## (undated) DEVICE — TUBE SUCTION MEDI-VAC STERILE

## (undated) DEVICE — SOL IRRI STRL WATER 1000ML

## (undated) DEVICE — GOWN SMART IMP BREATHABLE XXLG

## (undated) DEVICE — GLOVE PROTEXIS HYDROGEL SZ8

## (undated) DEVICE — GLOVE PROTEXIS LTX 6.5

## (undated) DEVICE — COVER LIGHT HANDLE FLEX GRN

## (undated) DEVICE — SUT MONOCRYL 4-0 PS-2

## (undated) DEVICE — GAUZE SPONGE 4X4 12PLY

## (undated) DEVICE — TAPE ADH MEDIPORE 4 X 10YDS

## (undated) DEVICE — CHLORAPREP W TINT 26ML APPL

## (undated) DEVICE — ADHESIVE MASTISOL VIAL 48/BX

## (undated) DEVICE — SPONGE LAP STRL 18X18IN

## (undated) DEVICE — TRAY DRY SKIN SCRUB PREP

## (undated) DEVICE — GLOVE PROTEXIS PF LATEX 7.0

## (undated) DEVICE — SUT CTD VICRYL 3-0 CR/SH

## (undated) DEVICE — ELECTRODE REM PLYHSV RETURN 9

## (undated) DEVICE — CLOSURE SKIN STERI STRIP 1/2X4

## (undated) DEVICE — GLOVE PROTEXIS BLUE LATEX 6.5

## (undated) DEVICE — NDL HYPO POLYPR STD 26G 1.5IN

## (undated) DEVICE — BLADE SURG CARBON STEEL SZ11

## (undated) DEVICE — Device

## (undated) DEVICE — GLOVE PROTEXIS BLUE LATEX 7